# Patient Record
Sex: FEMALE | Race: WHITE | URBAN - METROPOLITAN AREA
[De-identification: names, ages, dates, MRNs, and addresses within clinical notes are randomized per-mention and may not be internally consistent; named-entity substitution may affect disease eponyms.]

---

## 2023-01-28 ENCOUNTER — EMERGENCY (EMERGENCY)
Facility: HOSPITAL | Age: 73
LOS: 0 days | Discharge: HOME | End: 2023-01-28
Attending: EMERGENCY MEDICINE
Payer: COMMERCIAL

## 2023-01-28 VITALS
SYSTOLIC BLOOD PRESSURE: 171 MMHG | HEIGHT: 64 IN | DIASTOLIC BLOOD PRESSURE: 94 MMHG | WEIGHT: 119.93 LBS | TEMPERATURE: 98 F | RESPIRATION RATE: 18 BRPM | OXYGEN SATURATION: 96 % | HEART RATE: 95 BPM
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
## 2023-01-28 DIAGNOSIS — M25.562 PAIN IN LEFT KNEE: ICD-10-CM

## 2023-01-28 DIAGNOSIS — S80.02XA CONTUSION OF LEFT KNEE, INITIAL ENCOUNTER: ICD-10-CM

## 2023-01-28 DIAGNOSIS — M25.571 PAIN IN RIGHT ANKLE AND JOINTS OF RIGHT FOOT: ICD-10-CM

## 2023-01-28 DIAGNOSIS — S90.01XA CONTUSION OF RIGHT ANKLE, INITIAL ENCOUNTER: ICD-10-CM
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
## 2023-01-28 PROCEDURE — 73562 X-RAY EXAM OF KNEE 3: CPT | Mod: LT

## 2023-01-28 PROCEDURE — 73610 X-RAY EXAM OF ANKLE: CPT | Mod: RT

## 2023-01-28 PROCEDURE — 99284 EMERGENCY DEPT VISIT MOD MDM: CPT

## 2023-01-28 PROCEDURE — 73610 X-RAY EXAM OF ANKLE: CPT | Mod: 26,RT

## 2023-01-28 PROCEDURE — 73562 X-RAY EXAM OF KNEE 3: CPT | Mod: 26,LT
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
## 2023-01-28 NOTE — ED STATDOCS - NS ED ATTENDING STATEMENT MOD
This was a shared visit with the DAMON. I reviewed and verified the documentation and independently performed the documented:

## 2023-01-28 NOTE — ED STATDOCS - PATIENT PORTAL LINK FT
You can access the FollowMyHealth Patient Portal offered by [facility] by registering at the following website: [email or web address]. By joining [employer]’s FollowMyHealth portal, you will also be able to view your health information using other applications (apps) compatible with our system.
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
## 2023-01-28 NOTE — ED STATDOCS - PHYSICAL EXAMINATION
Constitutional: NAD, well appearing  HEENT: no rhinorrhea, PERRL, no oropharyngeal erythema or exudates, midline uvula.  TMs clear.  CVS:  RRR, no m/r/g  Resp:  CTAB  GI: soft, ntnd  MSK:  no restriction to rom, full ROM to all extremities. TTP of the right lateral malleolus. Mild TTP of the medial left knee. Likely contusion. NVI distally to both locations.   Neuro:  A&Ox3, 5/5 strength to all extremities,  SILT to all extremities  Skin: no rash  psych: clear thought content  Heme/lymph:  No LAD
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
## 2023-01-28 NOTE — ED ADULT TRIAGE NOTE - NS ED NURSE BANDS TYPE
----- Message from Conner Tyson DO sent at 5/15/2020  7:25 AM CDT -----  Call patient with results.  Positive urine culture. Recommend starting cipro 500mg twice daily for 5 day.                    Name band;
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
## 2023-01-28 NOTE — ED STATDOCS - NS ED ROS FT
Constitutional: nad, well appearing  HEENT:  no nasal congestion, eye drainage or ear pain.    CVS:  no cp  Resp:  No sob, no cough  GI:  no abdominal pain, no nausea or vomiting  :  no dysuria  MSK: no joint pain or limited ROM. +Right ankle and left knee pain  Skin: no rash  Neuro: no change in mental status or level of consciousness  Heme/lymph: no bleeding

## 2023-01-28 NOTE — ED STATDOCS - CLINICAL SUMMARY MEDICAL DECISION MAKING FREE TEXT BOX
XR of knee and ankle to evaluate for fracture/dislocation. Discharge home. XR of knee and ankle to evaluate for fracture/dislocation.  NVI.  Anticipate discharge home.

## 2025-01-11 NOTE — ED STATDOCS - WR ORDER STATUS 2
[facility] Neurosurgery Daily Progress Note    Patient: Emely Gamboa Date: 2025   : 1955 Attending: Ginger Ramires DO   Admit Date: 2025 Room/Bed: 6364/W1   69 year old female      SUBJECTIVE:  Patient seen and examined. Sitting up in chair, in NAD.  Grandson at the bedside.  Continued back pain. Has been able to ambulate with the walker in her room.  Voiding. + flatus, - BM.   Febrile to 101.5F overnight. WBC 10.9    Review of Systems   Constitutional:  Negative for fever.   HENT:  Negative for trouble swallowing.    Respiratory:  Negative for shortness of breath.    Cardiovascular:  Negative for chest pain.   Gastrointestinal:  Positive for constipation. Negative for abdominal pain and vomiting.   Genitourinary:  Negative for difficulty urinating.   Musculoskeletal:  Positive for back pain. Negative for neck pain.   Neurological:  Positive for weakness. Negative for numbness and headaches.   Psychiatric/Behavioral:  Negative for agitation and confusion.      OBJECTIVE:    Medications/Infusions:  Scheduled:   Current Facility-Administered Medications   Medication Dose Route Frequency Provider Last Rate Last Admin    enalapril (VASOTEC) tablet 5 mg  5 mg Oral Daily [address] Montenegro PA   5 mg at 25 0857    famotidine (PEPCID) tablet 40 mg  40 mg Oral Daily [address] Monteengro PA   40 mg at 01/10/25 0828    gabapentin (NEURONTIN) capsule 800 mg  800 mg Oral TID [address] Monten[place]ro PA   800 mg at 01/10/25 2140    latanoprost (XALATAN) 0.005 % ophthalmic solution 1 drop  1 drop Both Eyes Nightly Ave Montenegro PA   1 drop at 01/10/25 2141    sodium chloride 0.9 % injection 2 mL  2 mL Intracatheter 2 times per day [facility] [place]en[place] PA   2 mL at 01/10/25 2142    heparin (porcine) injection 5,000 Units  5,000 Units Subcutaneous 3 times per day [facility] [place]en[place] PA   5,000 Units at 25 0538    dorzolamide-timolol (COSOPT) 2-0.5 % ophthalmic solution 1 drop  1 drop Both Eyes BID Leann Hagan MD   1  drop at 01/10/25 2142    insulin lispro (ADMELOG,HumaLOG) - Correction Dose   Subcutaneous 4x Daily AC & HS Leann Hagan MD   2 Units at 01/10/25 2154     PRN:    Current Facility-Administered Medications   Medication Dose Route Frequency Provider Last Rate Last Admin    cyclobenzaprine (FLEXERIL) tablet 5 mg  5 mg Oral BID PRN [facility] [place]en[place] PA   5 mg at 01/08/25 1842    docusate sodium (COLACE) capsule 100 mg  100 mg Oral Daily PRN [facility] [place] PA        acetaminophen (TYLENOL) tablet 650 mg  650 mg Oral Q8H PRN [facility] [place] PA        HYDROcodone-acetaminophen (NORCO) 5-325 MG per tablet 1 tablet  1 tablet Oral Q4H PRN [facility] [place]en[place]ro PA   1 tablet at 01/10/25 2154    Or    HYDROcodone-acetaminophen (NORCO)  MG per tablet 1 tablet  1 tablet Oral Q4H PRN [facility] [place]en[place]ro PA   1 tablet at 01/11/25 0543    morphine injection 2 mg  2 mg Intravenous Q3H PRN [facility] [place] PA   2 mg at 01/08/25 1700    polyethylene glycol (MIRALAX) packet 17 g  17 g Oral Daily PRN [facility] [place] PA        docusate sodium-sennosides (SENOKOT S) 50-8.6 MG 2 tablet  2 tablet Oral BID PRN [facility] [place]ro PA   2 tablet at 01/10/25 0838    bisacodyl (DULCOLAX) suppository 10 mg  10 mg Rectal Daily PRN [facility] [place]en[place]ro PA   10 mg at 01/10/25 1437    magnesium hydroxide (MILK OF MAGNESIA) 400 MG/5ML suspension 30 mL  30 mL Oral Daily PRN [facility] [place]en[place]ro PA   30 mL at 01/11/25 0543    sodium chloride 0.9 % injection 10 mL  10 mL Intravenous PRN [facility] [place]en[place]ro PA   10 mL at 01/08/25 0853    melatonin tablet 3 mg  3 mg Oral Nightly PRN Leann Hagan MD        potassium CHLORIDE (KLOR-CON M) rupert ER tablet 40 mEq  40 mEq Oral Daily PRN Leann Hagan MD        dextrose 50 % injection 25 g  25 g Intravenous PRN Leann Hagan MD        dextrose 50 % injection 12.5 g  12.5 g Intravenous PRN Leann Hagan MD        glucagon (GLUCAGEN) injection 1 mg  1 mg Intramuscular PRN Leann Hagan MD        dextrose  (GLUTOSE) 40 % gel 15 g  15 g Oral PRN Leann Hagan MD        dextrose (GLUTOSE) 40 % gel 30 g  30 g Oral PRN Leann Hagan MD          Vital Last Value 24 Hour Range   Temperature (!) 101.5 °F (38.6 °C) (01/11/25 0532) Temp  Min: 98.2 °F (36.8 °C)  Max: 101.5 °F (38.6 °C)   Pulse 92 (01/11/25 0532) Pulse  Min: 88  Max: 96   Respiratory 16 (01/11/25 0643) Resp  Min: 16  Max: 16   Non-Invasive  Blood Pressure 120/72 (01/11/25 0532) BP  Min: 109/71  Max: 132/73   Arterial   Blood Pressure   No data recorded   Pulse Oximetry 96 % (01/11/25 0532) SpO2  Min: 96 %  Max: 97 %     Intake/Output:    Intake/Output Summary (Last 24 hours) at 1/11/2025 0744  Last data filed at 1/10/2025 0800  Gross per 24 hour   Intake --   Output 10 ml   Net -10 ml     Physical Exam:   Constitutional:  No acute distress.    Integument:  Warm. Dry. Lumbar incision closed with sutures, C/D/I.   HENT:  Normocephalic.   Eyes:  PERRL, EOM intact.  Neck: Supple.   Cardiac:  Peripheral perfusion appears normal.   GI:  Soft.  Respiratory:  No respiratory distress noted.  Neuro:  Alert, Oriented x4. Answers questions and follows commands appropriately. Speech fluent. Cranial nerves II-XII grossly intact. Sensation intact to light touch throughout. BUE 5/5. BLE 5/5.   Psych:  Cooperative, appropriate mood and affect.    Laboratory Results:  CBC  Recent Labs   Lab 01/11/25  0502 01/10/25  0439 01/09/25  0454 01/08/25  0445   WBC 10.9 8.1 9.8 10.2   HCT 33.4* 31.5* 35.4* 33.7*   HGB 10.9* 10.0* 11.5* 10.8*   PLT  --   --   --  158       CMP  Recent Labs   Lab 01/07/25  0439   SODIUM 139   POTASSIUM 3.4   CHLORIDE 111*   CO2 23   GLUCOSE 208*   BUN 11   CREATININE 0.97*   CALCIUM 8.1*       Coags  No results found      Microbiology:  Microbiology Results       None               Imaging:   XR LUMBAR SPINE 1 VIEW    Result Date: 1/6/2025  EXAM: XR LUMBAR SPINE 1 VIEW PROVIDED CLINICAL INFORMATION/INDICATION FOR STUDY: intraop#1 5' 1\" ft 152 lb  ADDITIONAL COMMENTS: TECHNIQUE: XR LUMBAR SPINE 1 VIEW COMPARISON: MRI of the lumbar spine May 17, 2024 X-ray lumbar spine flexion extension July 13, 2024     FINDINGS/IMPRESSION: 1.   Single crosstable lateral intraoperative radiograph demonstrates a clamp overlying the superior aspect of the posterior L4 vertebral body. Electronically Signed by: MEA STEEL MD Signed on: 1/6/2025 1:12 PM Workstation ID: [id number]  '      IMPRESSION/PLAN:  70yo female with PMHx HTN, HLD, DM, lumbar stenosis with neurogenic claudication who presents for elective lumbar decompression.    POD5 Posterior Laminectomy and Foraminotomy L4-5 (1/6/24)  Hemovac x1 removed 1/10    Plan:  - Serial neuro checks, notify of changes  - Hold home aspirin until clinic follow-up appointment  - Incision care: daily dressing changes with gauze and tape or Telfa dressing  - Pain control prn   - Diet as tolerated  - Activity as tolerated, recommend up with assist. Ok for PT/OT  - No brace needed  - Prophylaxis: SCDs, okay for chemoppx, bowel regimen, IS q1hr while awake  - Medical management per primary, appreciate   - Dispo: ok for dc from nsgy standpoint pending medical clearance    Ok to shower, no baths. Pat incision dry. Daily dressing changes. No creams, lotions, or ointments. Follow-up on 1/23 @ 9:30am for incision check and sutures removal. Call our office if you need to reschedule or with any questions/concerns.     Discussed with Dr. Bridges      Performed Resulted

## 2025-03-16 ENCOUNTER — INPATIENT (INPATIENT)
Facility: HOSPITAL | Age: 75
LOS: 3 days | Discharge: HOME | DRG: 201 | End: 2025-03-20
Attending: INTERNAL MEDICINE | Admitting: THORACIC SURGERY (CARDIOTHORACIC VASCULAR SURGERY)
Payer: MEDICARE

## 2025-03-16 VITALS
OXYGEN SATURATION: 93 % | HEART RATE: 91 BPM | RESPIRATION RATE: 19 BRPM | TEMPERATURE: 98 F | HEIGHT: 64 IN | WEIGHT: 124.56 LBS | SYSTOLIC BLOOD PRESSURE: 183 MMHG | DIASTOLIC BLOOD PRESSURE: 137 MMHG

## 2025-03-16 DIAGNOSIS — Z98.891 HISTORY OF UTERINE SCAR FROM PREVIOUS SURGERY: Chronic | ICD-10-CM

## 2025-03-16 DIAGNOSIS — J93.9 PNEUMOTHORAX, UNSPECIFIED: ICD-10-CM

## 2025-03-16 LAB
ALBUMIN SERPL ELPH-MCNC: 3.6 G/DL — SIGNIFICANT CHANGE UP (ref 3.3–5)
ALP SERPL-CCNC: 104 U/L — SIGNIFICANT CHANGE UP (ref 40–120)
ALT FLD-CCNC: 18 U/L — SIGNIFICANT CHANGE UP (ref 12–78)
AST SERPL-CCNC: 13 U/L — LOW (ref 15–37)
BASE EXCESS BLDV CALC-SCNC: -0.8 MMOL/L — SIGNIFICANT CHANGE UP (ref -2–3)
BASOPHILS # BLD AUTO: 0.08 K/UL — SIGNIFICANT CHANGE UP (ref 0–0.2)
BASOPHILS NFR BLD AUTO: 1.1 % — SIGNIFICANT CHANGE UP (ref 0–2)
BILIRUB SERPL-MCNC: 1.2 MG/DL — SIGNIFICANT CHANGE UP (ref 0.2–1.2)
BUN SERPL-MCNC: 9 MG/DL — SIGNIFICANT CHANGE UP (ref 7–23)
CALCIUM SERPL-MCNC: 9.2 MG/DL — SIGNIFICANT CHANGE UP (ref 8.5–10.1)
CHLORIDE SERPL-SCNC: 104 MMOL/L — SIGNIFICANT CHANGE UP (ref 96–108)
CO2 SERPL-SCNC: 27 MMOL/L — SIGNIFICANT CHANGE UP (ref 22–31)
CREAT SERPL-MCNC: 0.77 MG/DL — SIGNIFICANT CHANGE UP (ref 0.5–1.3)
EGFR: 81 ML/MIN/1.73M2 — SIGNIFICANT CHANGE UP
EGFR: 81 ML/MIN/1.73M2 — SIGNIFICANT CHANGE UP
EOSINOPHIL # BLD AUTO: 0.09 K/UL — SIGNIFICANT CHANGE UP (ref 0–0.5)
EOSINOPHIL NFR BLD AUTO: 1.3 % — SIGNIFICANT CHANGE UP (ref 0–6)
GAS PNL BLDV: SIGNIFICANT CHANGE UP
GLUCOSE SERPL-MCNC: 105 MG/DL — HIGH (ref 70–99)
HCO3 BLDV-SCNC: 25 MMOL/L — SIGNIFICANT CHANGE UP (ref 22–29)
HCT VFR BLD CALC: 43 % — SIGNIFICANT CHANGE UP (ref 34.5–45)
HGB BLD-MCNC: 14.3 G/DL — SIGNIFICANT CHANGE UP (ref 11.5–15.5)
IMM GRANULOCYTES # BLD AUTO: 0.01 K/UL — SIGNIFICANT CHANGE UP (ref 0–0.07)
IMM GRANULOCYTES NFR BLD AUTO: 0.1 % — SIGNIFICANT CHANGE UP (ref 0–0.9)
LYMPHOCYTES # BLD AUTO: 1.24 K/UL — SIGNIFICANT CHANGE UP (ref 1–3.3)
LYMPHOCYTES NFR BLD AUTO: 17.5 % — SIGNIFICANT CHANGE UP (ref 13–44)
MAGNESIUM SERPL-MCNC: 2 MG/DL — SIGNIFICANT CHANGE UP (ref 1.6–2.6)
MCHC RBC-ENTMCNC: 29.9 PG — SIGNIFICANT CHANGE UP (ref 27–34)
MCHC RBC-ENTMCNC: 33.3 G/DL — SIGNIFICANT CHANGE UP (ref 32–36)
MCV RBC AUTO: 90 FL — SIGNIFICANT CHANGE UP (ref 80–100)
MONOCYTES # BLD AUTO: 0.72 K/UL — SIGNIFICANT CHANGE UP (ref 0–0.9)
MONOCYTES NFR BLD AUTO: 10.2 % — SIGNIFICANT CHANGE UP (ref 2–14)
NEUTROPHILS # BLD AUTO: 4.94 K/UL — SIGNIFICANT CHANGE UP (ref 1.8–7.4)
NEUTROPHILS NFR BLD AUTO: 69.8 % — SIGNIFICANT CHANGE UP (ref 43–77)
NRBC # BLD AUTO: 0 K/UL — SIGNIFICANT CHANGE UP (ref 0–0)
NRBC # FLD: 0 K/UL — SIGNIFICANT CHANGE UP (ref 0–0)
NRBC BLD AUTO-RTO: 0 /100 WBCS — SIGNIFICANT CHANGE UP (ref 0–0)
NT-PROBNP SERPL-SCNC: 169 PG/ML — HIGH (ref 0–125)
PCO2 BLDV: 44 MMHG — HIGH (ref 39–42)
PH BLDV: 7.36 — SIGNIFICANT CHANGE UP (ref 7.32–7.43)
PLATELET # BLD AUTO: 323 K/UL — SIGNIFICANT CHANGE UP (ref 150–400)
PMV BLD: 9.8 FL — SIGNIFICANT CHANGE UP (ref 7–13)
PO2 BLDV: 44 MMHG — SIGNIFICANT CHANGE UP (ref 25–45)
POTASSIUM SERPL-MCNC: 3.5 MMOL/L — SIGNIFICANT CHANGE UP (ref 3.5–5.3)
POTASSIUM SERPL-SCNC: 3.5 MMOL/L — SIGNIFICANT CHANGE UP (ref 3.5–5.3)
RBC # BLD: 4.78 M/UL — SIGNIFICANT CHANGE UP (ref 3.8–5.2)
RBC # FLD: 13.2 % — SIGNIFICANT CHANGE UP (ref 10.3–14.5)
SAO2 % BLDV: 74 % — SIGNIFICANT CHANGE UP (ref 67–88)
TROPONIN I, HIGH SENSITIVITY RESULT: 9.72 NG/L — SIGNIFICANT CHANGE UP
WBC # BLD: 7.08 K/UL — SIGNIFICANT CHANGE UP (ref 3.8–10.5)
WBC # FLD AUTO: 7.08 K/UL — SIGNIFICANT CHANGE UP (ref 3.8–10.5)

## 2025-03-16 PROCEDURE — 93010 ELECTROCARDIOGRAM REPORT: CPT

## 2025-03-16 PROCEDURE — 80048 BASIC METABOLIC PNL TOTAL CA: CPT

## 2025-03-16 PROCEDURE — 83036 HEMOGLOBIN GLYCOSYLATED A1C: CPT

## 2025-03-16 PROCEDURE — 84100 ASSAY OF PHOSPHORUS: CPT

## 2025-03-16 PROCEDURE — 71045 X-RAY EXAM CHEST 1 VIEW: CPT | Mod: 26,76

## 2025-03-16 PROCEDURE — 94640 AIRWAY INHALATION TREATMENT: CPT

## 2025-03-16 PROCEDURE — 71275 CT ANGIOGRAPHY CHEST: CPT | Mod: 26

## 2025-03-16 PROCEDURE — 32556 INSERT CATH PLEURA W/O IMAGE: CPT | Mod: LT

## 2025-03-16 PROCEDURE — 99285 EMERGENCY DEPT VISIT HI MDM: CPT

## 2025-03-16 PROCEDURE — 99282 EMERGENCY DEPT VISIT SF MDM: CPT | Mod: 25

## 2025-03-16 PROCEDURE — 36415 COLL VENOUS BLD VENIPUNCTURE: CPT

## 2025-03-16 PROCEDURE — 83735 ASSAY OF MAGNESIUM: CPT

## 2025-03-16 PROCEDURE — 85027 COMPLETE CBC AUTOMATED: CPT

## 2025-03-16 PROCEDURE — 71045 X-RAY EXAM CHEST 1 VIEW: CPT

## 2025-03-16 PROCEDURE — 80061 LIPID PANEL: CPT

## 2025-03-16 PROCEDURE — 84443 ASSAY THYROID STIM HORMONE: CPT

## 2025-03-16 PROCEDURE — 74177 CT ABD & PELVIS W/CONTRAST: CPT | Mod: 26

## 2025-03-16 PROCEDURE — 71250 CT THORAX DX C-: CPT | Mod: MC

## 2025-03-16 RX ORDER — IPRATROPIUM BROMIDE AND ALBUTEROL SULFATE .5; 2.5 MG/3ML; MG/3ML
3 SOLUTION RESPIRATORY (INHALATION) EVERY 6 HOURS
Refills: 0 | Status: DISCONTINUED | OUTPATIENT
Start: 2025-03-16 | End: 2025-03-20

## 2025-03-16 RX ORDER — ENOXAPARIN SODIUM 100 MG/ML
30 INJECTION SUBCUTANEOUS EVERY 24 HOURS
Refills: 0 | Status: DISCONTINUED | OUTPATIENT
Start: 2025-03-17 | End: 2025-03-20

## 2025-03-16 RX ORDER — IPRATROPIUM BROMIDE AND ALBUTEROL SULFATE .5; 2.5 MG/3ML; MG/3ML
3 SOLUTION RESPIRATORY (INHALATION) ONCE
Refills: 0 | Status: DISCONTINUED | OUTPATIENT
Start: 2025-03-16 | End: 2025-03-16

## 2025-03-16 RX ORDER — OXYCODONE HYDROCHLORIDE 30 MG/1
10 TABLET ORAL EVERY 4 HOURS
Refills: 0 | Status: DISCONTINUED | OUTPATIENT
Start: 2025-03-16 | End: 2025-03-20

## 2025-03-16 RX ORDER — KETOROLAC TROMETHAMINE 30 MG/ML
15 INJECTION, SOLUTION INTRAMUSCULAR; INTRAVENOUS EVERY 8 HOURS
Refills: 0 | Status: DISCONTINUED | OUTPATIENT
Start: 2025-03-16 | End: 2025-03-17

## 2025-03-16 RX ORDER — DIAZEPAM 2 MG/1
2 TABLET ORAL ONCE
Refills: 0 | Status: DISCONTINUED | OUTPATIENT
Start: 2025-03-16 | End: 2025-03-16

## 2025-03-16 RX ORDER — OXYCODONE HYDROCHLORIDE 30 MG/1
5 TABLET ORAL EVERY 4 HOURS
Refills: 0 | Status: DISCONTINUED | OUTPATIENT
Start: 2025-03-16 | End: 2025-03-20

## 2025-03-16 RX ORDER — ACETAMINOPHEN 500 MG/5ML
1000 LIQUID (ML) ORAL ONCE
Refills: 0 | Status: COMPLETED | OUTPATIENT
Start: 2025-03-16 | End: 2025-03-16

## 2025-03-16 RX ORDER — ACETAMINOPHEN 500 MG/5ML
650 LIQUID (ML) ORAL EVERY 6 HOURS
Refills: 0 | Status: DISCONTINUED | OUTPATIENT
Start: 2025-03-16 | End: 2025-03-20

## 2025-03-16 RX ORDER — DIAZEPAM 2 MG/1
5 TABLET ORAL EVERY 8 HOURS
Refills: 0 | Status: DISCONTINUED | OUTPATIENT
Start: 2025-03-16 | End: 2025-03-20

## 2025-03-16 RX ADMIN — KETOROLAC TROMETHAMINE 15 MILLIGRAM(S): 30 INJECTION, SOLUTION INTRAMUSCULAR; INTRAVENOUS at 18:37

## 2025-03-16 RX ADMIN — OXYCODONE HYDROCHLORIDE 5 MILLIGRAM(S): 30 TABLET ORAL at 22:35

## 2025-03-16 RX ADMIN — DIAZEPAM 2 MILLIGRAM(S): 2 TABLET ORAL at 18:58

## 2025-03-16 RX ADMIN — Medication 400 MILLIGRAM(S): at 18:37

## 2025-03-16 RX ADMIN — OXYCODONE HYDROCHLORIDE 5 MILLIGRAM(S): 30 TABLET ORAL at 22:15

## 2025-03-16 RX ADMIN — IPRATROPIUM BROMIDE AND ALBUTEROL SULFATE 3 MILLILITER(S): .5; 2.5 SOLUTION RESPIRATORY (INHALATION) at 20:34

## 2025-03-16 RX ADMIN — Medication 4 MILLIGRAM(S): at 17:40

## 2025-03-16 NOTE — ED ADULT NURSE REASSESSMENT NOTE - NS ED NURSE REASSESS COMMENT FT1
Obtained bedside report from SIRISHA Ramirez at 19:00. Pt resting comfortably in bed, breathing even and unlabored on 15L NRB. Pt reports improving left sided rib pain s/p chest tube placement. Pt's chest tube connected on left side on low continuous -20 suction. Pt has scant amount of light red fluid in draining tube, but no output in chest tube box. VS as charted. Pt awaiting admission bed. Safety and comfort measures maintained.

## 2025-03-16 NOTE — PROCEDURE NOTE - NSASSISTBY_GEN_A_CORE
Transitional Care Management    Patient discharged to skilled nursing facility for short term rehab. No TCM call required per policy.   Per chart review patient has been seeing Darlene Ramos at [facility].    Linda Narvaez RN on 9/28/2022 at 9:13 AM    
Myself

## 2025-03-16 NOTE — ED PROVIDER NOTE - OBJECTIVE STATEMENT
74F here for worsening DE LEÓN x 2weeks. Pt came at behest of her sister who has noticed her tolerance for exertion decreasing. pt has not seen a doctor in 14 years since a pulmonologist told her she has emphysema. Pt takes no medications, sometimes uses her sister's inhaler but says she doesn't like to because of the taste.

## 2025-03-16 NOTE — ED ADULT NURSE NOTE - OBJECTIVE STATEMENT
The patient is a 74y Female complaining of shortness of breath. PMH: emphysema, 2 weeks of worsening breathing, denies fevers, CP, palpitations. large hernia noted to kylie

## 2025-03-16 NOTE — H&P ADULT - NSHPLABSRESULTS_GEN_ALL_CORE
< from: CT Angio Chest PE Protocol w/ IV Cont (03.16.25 @ 17:05) >    IMPRESSION:  Large left pneumothorax without volume loss in the setting of COPD.   Tension pneumothorax cannot be excluded. This critical value was   discussed with MOON Nguyen in the emergency room at 5:20 PM on 3/16/2025.   Clinical correlation is necessary.    Atelectasis of the left lower lobe and mild left pleural fluid.    Large ventral hernia containing small and large bowel without obstruction.    < end of copied text >

## 2025-03-16 NOTE — ED PROVIDER NOTE - PROGRESS NOTE DETAILS
signed Anna Nguyen PA-C   Pt says she noticed the abdominal wall mass about 2 years ago and it has been increasing in size. Plan labs, CT, ambulatory pulse ox. Pt agrees with plan of  care. signed Anna Nguyen, PA-C   cxr with large left PTX. I spoke to thoracic PA, will see pt in ED.

## 2025-03-16 NOTE — H&P ADULT - ASSESSMENT
This is a 75 y/o F , non compliant, + COPD with large left pneumothorax. s/p Left pigtail insertion

## 2025-03-16 NOTE — PATIENT PROFILE ADULT - FALL HARM RISK - RISK INTERVENTIONS

## 2025-03-16 NOTE — H&P ADULT - PROBLEM SELECTOR PLAN 1
Admit to Dr Mejia service   Left pigtail to -20 LWCS  pain medication  IS  Hospitalist consult   Lovenox DVTP   Bed rest   Valium PRN   DW DR Higginbotham Admit to Dr Mejia service   Left pigtail to -20 LWCS  pain medication  IS  Hospitalist consult   Pulmonology consult   Lovenox DVTP   Bed rest   Valium PRN   COPD- nebs prn  DW DR Higginbotham

## 2025-03-16 NOTE — H&P ADULT - HISTORY OF PRESENT ILLNESS
This is a 73 y/o F who [facility] not [facility] to [facility] over [facility], as per her sister she has not been breathing well the last 2 weeks and has been getting progressively worse. The patient has been refusing to come to the hospital. Pt sister finally convinced her to go and on workup noted a large left PTX.  Left Pigtail was placed at bedside with complete expansion of lung . Pt denies CP, HA, dizziness .

## 2025-03-16 NOTE — H&P ADULT - RESPIRATORY
no wheezes/no respiratory distress/no use of accessory muscles/no subcutaneous emphysema/diminished breath sounds, L

## 2025-03-16 NOTE — ED PROVIDER NOTE - ATTENDING APP SHARED VISIT CONTRIBUTION OF CARE
I, Dieter Arizmendi DO, personally saw the patient with ACP.  I have personally performed a face to face diagnostic evaluation on this patient.  I have reviewed the ACP note and agree with the history, exam, and plan of care, except as noted.  I personally saw the patient and performed a substantive portion of the visit including all aspects of the medical decision making.

## 2025-03-16 NOTE — ED ADULT NURSE NOTE - NS ED NOTE ABUSE SUSPICION NEGLECT YN
Received call from Kaylin at [facility] stating that patient will likely be discharged home today from in patient rehab. Kaylin requesting our clinic's standard home health orders for their MD to include in patient's home health orders. Standard home health LVAD orders reviewed with Brendan Tee and faxed to Kaylin for review by her MD.     Km Lopez and spoke to patient's sister Albert Cristy who confirmed that she is aware patient to be discharged today. She is unsure if patient's nephew ordered dressing change supplies on Friday. Provided number to Phyllis and requested Albert Muniz call to check status of shipment and confirm address. She verbalized understanding. Received return call from Albert Muniz stating that she spoke to Phyllis who stated they do not have a current order for this patient. No

## 2025-03-16 NOTE — ED PROVIDER NOTE - CLINICAL SUMMARY MEDICAL DECISION MAKING FREE TEXT BOX
74F here for worsening DE LEÓN x 2weeks. Pt came at behest of her sister who has noticed her tolerance for exertion decreasing. pt has not seen a doctor in 14 years since a pulmonologist told her she has emphysema. Patient well-appearing, hemodynamically stable with clear lungs otherwise nonfocal exam.  Differential includes to new CHF, low suspicion for COPD exacerbation, some concern for pneumonia versus other pulmonary pathology.  Plan for labs, EKG, chest x-ray.  Reassess

## 2025-03-17 PROBLEM — Z78.9 OTHER SPECIFIED HEALTH STATUS: Chronic | Status: ACTIVE | Noted: 2023-01-28

## 2025-03-17 LAB
A1C WITH ESTIMATED AVERAGE GLUCOSE RESULT: 5.8 % — HIGH (ref 4–5.6)
ADD ON TEST-SPECIMEN IN LAB: SIGNIFICANT CHANGE UP
ANION GAP SERPL CALC-SCNC: 6 MMOL/L — SIGNIFICANT CHANGE UP (ref 5–17)
BUN SERPL-MCNC: 17 MG/DL — SIGNIFICANT CHANGE UP (ref 7–23)
CALCIUM SERPL-MCNC: 8.8 MG/DL — SIGNIFICANT CHANGE UP (ref 8.5–10.1)
CHLORIDE SERPL-SCNC: 108 MMOL/L — SIGNIFICANT CHANGE UP (ref 96–108)
CHOLEST SERPL-MCNC: 171 MG/DL — SIGNIFICANT CHANGE UP
CO2 SERPL-SCNC: 24 MMOL/L — SIGNIFICANT CHANGE UP (ref 22–31)
CREAT SERPL-MCNC: 1.02 MG/DL — SIGNIFICANT CHANGE UP (ref 0.5–1.3)
EGFR: 58 ML/MIN/1.73M2 — LOW
EGFR: 58 ML/MIN/1.73M2 — LOW
ESTIMATED AVERAGE GLUCOSE: 120 MG/DL — HIGH (ref 68–114)
GLUCOSE SERPL-MCNC: 113 MG/DL — HIGH (ref 70–99)
HCT VFR BLD CALC: 37.6 % — SIGNIFICANT CHANGE UP (ref 34.5–45)
HDLC SERPL-MCNC: 35 MG/DL — LOW
HGB BLD-MCNC: 12.4 G/DL — SIGNIFICANT CHANGE UP (ref 11.5–15.5)
LIPID PNL WITH DIRECT LDL SERPL: 112 MG/DL — HIGH
MAGNESIUM SERPL-MCNC: 2 MG/DL — SIGNIFICANT CHANGE UP (ref 1.6–2.6)
MCHC RBC-ENTMCNC: 29.7 PG — SIGNIFICANT CHANGE UP (ref 27–34)
MCHC RBC-ENTMCNC: 33 G/DL — SIGNIFICANT CHANGE UP (ref 32–36)
MCV RBC AUTO: 90.2 FL — SIGNIFICANT CHANGE UP (ref 80–100)
NON HDL CHOLESTEROL: 136 MG/DL — HIGH
NRBC # BLD AUTO: 0 K/UL — SIGNIFICANT CHANGE UP (ref 0–0)
NRBC # FLD: 0 K/UL — SIGNIFICANT CHANGE UP (ref 0–0)
NRBC BLD AUTO-RTO: 0 /100 WBCS — SIGNIFICANT CHANGE UP (ref 0–0)
PHOSPHATE SERPL-MCNC: 5 MG/DL — HIGH (ref 2.5–4.5)
PLATELET # BLD AUTO: 299 K/UL — SIGNIFICANT CHANGE UP (ref 150–400)
PMV BLD: 10.3 FL — SIGNIFICANT CHANGE UP (ref 7–13)
POTASSIUM SERPL-MCNC: 3.6 MMOL/L — SIGNIFICANT CHANGE UP (ref 3.5–5.3)
POTASSIUM SERPL-SCNC: 3.6 MMOL/L — SIGNIFICANT CHANGE UP (ref 3.5–5.3)
RBC # BLD: 4.17 M/UL — SIGNIFICANT CHANGE UP (ref 3.8–5.2)
RBC # FLD: 13.3 % — SIGNIFICANT CHANGE UP (ref 10.3–14.5)
SODIUM SERPL-SCNC: 138 MMOL/L — SIGNIFICANT CHANGE UP (ref 135–145)
TRIGL SERPL-MCNC: 132 MG/DL — SIGNIFICANT CHANGE UP
TSH SERPL-MCNC: 3.75 UU/ML — SIGNIFICANT CHANGE UP (ref 0.34–4.82)
WBC # BLD: 9.42 K/UL — SIGNIFICANT CHANGE UP (ref 3.8–10.5)
WBC # FLD AUTO: 9.42 K/UL — SIGNIFICANT CHANGE UP (ref 3.8–10.5)

## 2025-03-17 PROCEDURE — 99231 SBSQ HOSP IP/OBS SF/LOW 25: CPT

## 2025-03-17 PROCEDURE — 99232 SBSQ HOSP IP/OBS MODERATE 35: CPT

## 2025-03-17 PROCEDURE — 99222 1ST HOSP IP/OBS MODERATE 55: CPT

## 2025-03-17 PROCEDURE — 71045 X-RAY EXAM CHEST 1 VIEW: CPT | Mod: 26,77

## 2025-03-17 PROCEDURE — 71045 X-RAY EXAM CHEST 1 VIEW: CPT | Mod: 26

## 2025-03-17 RX ORDER — PREDNISONE 20 MG/1
20 TABLET ORAL DAILY
Refills: 0 | Status: DISCONTINUED | OUTPATIENT
Start: 2025-03-17 | End: 2025-03-20

## 2025-03-17 RX ORDER — TIOTROPIUM BROMIDE AND OLODATEROL 3.124; 2.736 UG/1; UG/1
2 SPRAY, METERED RESPIRATORY (INHALATION) DAILY
Refills: 0 | Status: DISCONTINUED | OUTPATIENT
Start: 2025-03-17 | End: 2025-03-20

## 2025-03-17 RX ADMIN — IPRATROPIUM BROMIDE AND ALBUTEROL SULFATE 3 MILLILITER(S): .5; 2.5 SOLUTION RESPIRATORY (INHALATION) at 02:00

## 2025-03-17 RX ADMIN — IPRATROPIUM BROMIDE AND ALBUTEROL SULFATE 3 MILLILITER(S): .5; 2.5 SOLUTION RESPIRATORY (INHALATION) at 08:58

## 2025-03-17 RX ADMIN — OXYCODONE HYDROCHLORIDE 5 MILLIGRAM(S): 30 TABLET ORAL at 07:56

## 2025-03-17 RX ADMIN — OXYCODONE HYDROCHLORIDE 5 MILLIGRAM(S): 30 TABLET ORAL at 03:58

## 2025-03-17 RX ADMIN — KETOROLAC TROMETHAMINE 15 MILLIGRAM(S): 30 INJECTION, SOLUTION INTRAMUSCULAR; INTRAVENOUS at 13:17

## 2025-03-17 RX ADMIN — ENOXAPARIN SODIUM 30 MILLIGRAM(S): 100 INJECTION SUBCUTANEOUS at 09:20

## 2025-03-17 RX ADMIN — IPRATROPIUM BROMIDE AND ALBUTEROL SULFATE 3 MILLILITER(S): .5; 2.5 SOLUTION RESPIRATORY (INHALATION) at 15:09

## 2025-03-17 RX ADMIN — KETOROLAC TROMETHAMINE 15 MILLIGRAM(S): 30 INJECTION, SOLUTION INTRAMUSCULAR; INTRAVENOUS at 05:25

## 2025-03-17 RX ADMIN — IPRATROPIUM BROMIDE AND ALBUTEROL SULFATE 3 MILLILITER(S): .5; 2.5 SOLUTION RESPIRATORY (INHALATION) at 19:54

## 2025-03-17 RX ADMIN — KETOROLAC TROMETHAMINE 15 MILLIGRAM(S): 30 INJECTION, SOLUTION INTRAMUSCULAR; INTRAVENOUS at 07:56

## 2025-03-17 RX ADMIN — PREDNISONE 20 MILLIGRAM(S): 20 TABLET ORAL at 11:38

## 2025-03-17 NOTE — PROGRESS NOTE ADULT - SUBJECTIVE AND OBJECTIVE BOX
Subjective:  Pt seen, no new complaints. Seen on 4L    Vital Signs:  Vital Signs Last 24 Hrs  T(C): 36.5 (25 @ 08:27), Max: 36.7 (25 @ 20:35)  T(F): 97.7 (25 @ 08:27), Max: 98.1 (25 @ 20:35)  HR: 69 (25 @ 08:27) (68 - 91)  BP: 125/71 (25 @ 08:27) (105/60 - 183/137)  RR: 18 (25 @ 08:27) (16 - 19)  SpO2: 100% (25 @ 08:27) (86% - 100%) on (O2)    Telemetry/Alarms:    Relevant labs, radiology and Medications reviewed                        12.4   9.42  )-----------( 299      ( 17 Mar 2025 07:14 )             37.6     -    138  |  108  |  17  ----------------------------<  113[H]  3.6   |  24  |  1.02    Ca    8.8      17 Mar 2025 07:14  Phos  5.0     -  Mg     2.0         TPro  7.3  /  Alb  3.6  /  TBili  1.2  /  DBili  x   /  AST  13[L]  /  ALT  18  /  AlkPhos  104  03-16      MEDICATIONS  (STANDING):  albuterol/ipratropium for Nebulization 3 milliLiter(s) Nebulizer every 6 hours  enoxaparin Injectable 30 milliGRAM(s) SubCutaneous every 24 hours  ketorolac   Injectable 15 milliGRAM(s) IV Push every 8 hours  predniSONE   Tablet 20 milliGRAM(s) Oral daily  tiotropium 2.5 MICROgram(s)/olodaterol 2.5 MICROgram(s) (STIOLTO) Inhaler 2 Puff(s) Inhalation daily    MEDICATIONS  (PRN):  acetaminophen     Tablet .. 650 milliGRAM(s) Oral every 6 hours PRN Mild Pain (1 - 3)  diazepam    Tablet 5 milliGRAM(s) Oral every 8 hours PRN muscle spasm  oxyCODONE    IR 5 milliGRAM(s) Oral every 4 hours PRN Moderate Pain (4 - 6)  oxyCODONE    IR 10 milliGRAM(s) Oral every 4 hours PRN Severe Pain (7 - 10)      Physical exam  Gen NAD  Neuro  AAOx3  Card RRR  Pulm equal expansion, chest tube to suction, no AL noted  Abd soft  Ext warm        I&O's Summary      Assessment  74y Female  w/ PAST MEDICAL & SURGICAL HISTORY:  No pertinent past medical history      Emphysema/COPD      H/O  section      admitted with complaints of Patient is a 74y old  Female who presents with a chief complaint of left Pneumothorax (17 Mar 2025 09:44)  .  75 y/o F , non compliant, + COPD with large left pneumothorax. s/p Left pigtail insertion 3/16/25    Pigtial placed to waterseal, CXR in 4 hours  pulmonary consulted, will start steroids and pulmonary meds  has not seen HCP in ~14 years, medical w/u  transfer to medical service, d/w Dr. Cevallos  will follow    Discussed with Cardiothoracic Team at AM rounds.      Subjective:  Pt seen, no new complaints. Seen on 4L    Vital Signs:  Vital Signs Last 24 Hrs  T(C): 36.5 (25 @ 08:27), Max: 36.7 (25 @ 20:35)  T(F): 97.7 (25 @ 08:27), Max: 98.1 (25 @ 20:35)  HR: 69 (25 @ 08:27) (68 - 91)  BP: 125/71 (25 @ 08:27) (105/60 - 183/137)  RR: 18 (25 @ 08:27) (16 - 19)  SpO2: 100% (25 @ 08:27) (86% - 100%) on (O2)    Telemetry/Alarms:    Relevant labs, radiology and Medications reviewed                        12.4   9.42  )-----------( 299      ( 17 Mar 2025 07:14 )             37.6     -    138  |  108  |  17  ----------------------------<  113[H]  3.6   |  24  |  1.02    Ca    8.8      17 Mar 2025 07:14  Phos  5.0     -  Mg     2.0         TPro  7.3  /  Alb  3.6  /  TBili  1.2  /  DBili  x   /  AST  13[L]  /  ALT  18  /  AlkPhos  104  03-16      MEDICATIONS  (STANDING):  albuterol/ipratropium for Nebulization 3 milliLiter(s) Nebulizer every 6 hours  enoxaparin Injectable 30 milliGRAM(s) SubCutaneous every 24 hours  ketorolac   Injectable 15 milliGRAM(s) IV Push every 8 hours  predniSONE   Tablet 20 milliGRAM(s) Oral daily  tiotropium 2.5 MICROgram(s)/olodaterol 2.5 MICROgram(s) (STIOLTO) Inhaler 2 Puff(s) Inhalation daily    MEDICATIONS  (PRN):  acetaminophen     Tablet .. 650 milliGRAM(s) Oral every 6 hours PRN Mild Pain (1 - 3)  diazepam    Tablet 5 milliGRAM(s) Oral every 8 hours PRN muscle spasm  oxyCODONE    IR 5 milliGRAM(s) Oral every 4 hours PRN Moderate Pain (4 - 6)  oxyCODONE    IR 10 milliGRAM(s) Oral every 4 hours PRN Severe Pain (7 - 10)      Physical exam  Gen NAD  Neuro  AAOx3  Card RRR  Pulm equal expansion, chest tube to suction, no AL noted  Abd soft  Ext warm        I&O's Summary      Assessment  74y Female  w/ PAST MEDICAL & SURGICAL HISTORY:  No pertinent past medical history      Emphysema/COPD      H/O  section      admitted with complaints of Patient is a 74y old  Female who presents with a chief complaint of left Pneumothorax (17 Mar 2025 09:44)  .  75 y/o F , non compliant, + COPD with large left pneumothorax. s/p Left pigtail insertion 3/16/25    Pigtial placed to waterseal, CXR in 4 hours  pulmonary consulted, will start steroids and pulmonary meds  has not seen HCP in ~14 years, medical w/u  transfer to medical service, d/w Dr. Cevallos  wean O2 as able, may need home O2 on d/c  will follow    Discussed with Cardiothoracic Team at AM rounds.

## 2025-03-17 NOTE — CONSULT NOTE ADULT - SUBJECTIVE AND OBJECTIVE BOX
HPI:  This is a 73 y/o F who [facility] not been to [facility] in over 14 years, as per her sister she has not been breathing well the last 2 weeks and has been getting progressively worse. The patient has been refusing to come to the hospital. Pt sister finally convinced her to go and on workup noted a large left PTX.  Left Pigtail was placed at bedside with complete expansion of lung . Pt denies CP, HA, dizziness .   (16 Mar 2025 18:17)    PAST MEDICAL & SURGICAL HISTORY:  No pertinent past medical history      Emphysema/COPD      H/O  section          MEDICATIONS  (STANDING):  albuterol/ipratropium for Nebulization 3 milliLiter(s) Nebulizer every 6 hours  enoxaparin Injectable 30 milliGRAM(s) SubCutaneous every 24 hours  ketorolac   Injectable 15 milliGRAM(s) IV Push every 8 hours  predniSONE   Tablet 20 milliGRAM(s) Oral daily  tiotropium 2.5 MICROgram(s)/olodaterol 2.5 MICROgram(s) (STIOLTO) Inhaler 2 Puff(s) Inhalation daily    MEDICATIONS  (PRN):  acetaminophen     Tablet .. 650 milliGRAM(s) Oral every 6 hours PRN Mild Pain (1 - 3)  diazepam    Tablet 5 milliGRAM(s) Oral every 8 hours PRN muscle spasm  oxyCODONE    IR 5 milliGRAM(s) Oral every 4 hours PRN Moderate Pain (4 - 6)  oxyCODONE    IR 10 milliGRAM(s) Oral every 4 hours PRN Severe Pain (7 - 10)      Allergies    No Known Allergies    Intolerances        SOCIAL HISTORY: Denies tobacco, etoh abuse or illicit drug use    FAMILY HISTORY:      Vital Signs Last 24 Hrs  T(C): 36.5 (17 Mar 2025 08:27), Max: 36.7 (16 Mar 2025 20:35)  T(F): 97.7 (17 Mar 2025 08:27), Max: 98.1 (16 Mar 2025 20:35)  HR: 69 (17 Mar 2025 08:27) (68 - 91)  BP: 125/71 (17 Mar 2025 08:27) (105/60 - 183/137)  BP(mean): 90 (16 Mar 2025 20:35) (90 - 149)  RR: 18 (17 Mar 2025 08:27) (16 - 19)  SpO2: 100% (17 Mar 2025 08:27) (86% - 100%)    Parameters below as of 17 Mar 2025 08:27  Patient On (Oxygen Delivery Method): mask, nonrebreather        REVIEW OF SYSTEMS:    CONSTITUTIONAL:  As per HPI.  HEENT:  Eyes:  No diplopia or blurred vision. ENT:  No earache, sore throat or runny nose.  CARDIOVASCULAR:  No pressure, squeezing, tightness, heaviness or aching about the chest, neck, axilla or epigastrium.  RESPIRATORY:  No cough, shortness of breath, PND or orthopnea.  GASTROINTESTINAL:  No nausea, vomiting or diarrhea.  GENITOURINARY:  No dysuria, frequency or urgency.  MUSCULOSKELETAL:  As per HPI.  SKIN:  No change in skin, hair or nails.  NEUROLOGIC:  No paresthesias, fasciculations, seizures or weakness.  PSYCHIATRIC:  No disorder of thought or mood.  ENDOCRINE:  No heat or cold intolerance, polyuria or polydipsia.  HEMATOLOGICAL:  No easy bruising or bleedings:  .     PHYSICAL EXAMINATION:    GENERAL APPEARANCE:  Pt. is alert, oriented, and pleasant. No Respiratory  distress.   Well-hydrated    On oxygen via nc @   HEENT:  normocephalic,atraumatic,  Pupils are normal and react normally. No icterus.  Mucous membranes of oral cavity is  moist .   eom - wnl   Normal hearing   NECK:  Supple. No cervical or supraclavicular adenopathy. No jugular venous distension   Supple with full range of motion ,  Carotids equal.   HEART:   The cardiac impulse has a normal quality. Regular Rate and rhythm. Normal S1 and S2. There are no murmurs, rubs or gallops noted  CHEST:   Normal respiratory effort. B/l equal air entry , Adequate air movement   No wheeze, no crackles , No use of accessory muscles   ABDOMEN:  Soft and nontender, No hernias  EXTREMITIES:  There is no cyanosis, clubbing or edema.   SKIN:  No rash or significant lesions are noted.  NEUROLOGIC:  Oriented x 3.  Appropriate mood and affect. No focal deficits         LABS:                        12.4   9.42  )-----------( 299      ( 17 Mar 2025 07:14 )             37.6     03-17    138  |  108  |  17  ----------------------------<  113[H]  3.6   |  24  |  1.02    Ca    8.8      17 Mar 2025 07:14  Phos  5.0     03-17  Mg     2.0     03-17    TPro  7.3  /  Alb  3.6  /  TBili  1.2  /  DBili  x   /  AST  13[L]  /  ALT  18  /  AlkPhos  104  03-16    LIVER FUNCTIONS - ( 16 Mar 2025 16:18 )  Alb: 3.6 g/dL / Pro: 7.3 gm/dL / ALK PHOS: 104 U/L / ALT: 18 U/L / AST: 13 U/L / GGT: x                 Urinalysis Basic - ( 17 Mar 2025 07:14 )    Color: x / Appearance: x / SG: x / pH: x  Gluc: 113 mg/dL / Ketone: x  / Bili: x / Urobili: x   Blood: x / Protein: x / Nitrite: x   Leuk Esterase: x / RBC: x / WBC x   Sq Epi: x / Non Sq Epi: x / Bacteria: x           HPI:  73 y/o Current current smoker with history of shortness of breath last 2 weeks and has been getting progressively worse.  Chest x-ray shows left-sided tension pneumothorax. Status post Left Pigtail was placed at bedside with complete expansion of lung . Pt denies CP, HA, dizziness .   (16 Mar 2025 18:17)  Additional pulmonary history includes.  Previous history of emphysema.  Not on inhaled medications.  Baseline chronic productive cough.  No prior history of pneumothorax.  No recent hospitalization does not use inhaled medications no prior diagnosis of lung cancer.  Able to perform activities of daily living without getting shortness of breath    PAST MEDICAL & SURGICAL HISTORY:  No pertinent past medical history      Emphysema/COPD      H/O  section          MEDICATIONS  (STANDING):  albuterol/ipratropium for Nebulization 3 milliLiter(s) Nebulizer every 6 hours  enoxaparin Injectable 30 milliGRAM(s) SubCutaneous every 24 hours  predniSONE   Tablet 20 milliGRAM(s) Oral daily  tiotropium 2.5 MICROgram(s)/olodaterol 2.5 MICROgram(s) (STIOLTO) Inhaler 2 Puff(s) Inhalation daily    MEDICATIONS  (PRN):  acetaminophen     Tablet .. 650 milliGRAM(s) Oral every 6 hours PRN Mild Pain (1 - 3)  diazepam    Tablet 5 milliGRAM(s) Oral every 8 hours PRN muscle spasm  oxyCODONE    IR 5 milliGRAM(s) Oral every 4 hours PRN Moderate Pain (4 - 6)  oxyCODONE    IR 10 milliGRAM(s) Oral every 4 hours PRN Severe Pain (7 - 10)      Allergies    No Known Allergies    Intolerances        SOCIAL HISTORY: Denies tobacco, etoh abuse or illicit drug use    FAMILY HISTORY:      Vital Signs Last 24 Hrs  T(C): 36.7 (17 Mar 2025 20:30), Max: 36.7 (16 Mar 2025 21:40)  T(F): 98.1 (17 Mar 2025 20:30), Max: 98.1 (16 Mar 2025 21:40)  HR: 97 (17 Mar 2025 20:30) (68 - 97)  BP: 127/76 (17 Mar 2025 20:30) (105/60 - 144/92)  BP(mean): --  RR: 18 (17 Mar 2025 20:30) (16 - 19)  SpO2: 93% (17 Mar 2025 20:30) (86% - 100%)    Parameters below as of 17 Mar 2025 20:30  Patient On (Oxygen Delivery Method): nasal cannula  O2 Flow (L/min): 6      REVIEW OF SYSTEMS:    CONSTITUTIONAL:  As per HPI.  HEENT:  Eyes:  No diplopia or blurred vision. ENT:  No earache, sore throat or runny nose.  CARDIOVASCULAR: Patient reports some chest pressure left side chest pain from the site of chest tubeRESPIRATORY:  No cough, shortness of breath, PND or orthopnea.  GASTROINTESTINAL:  No nausea, vomiting or diarrhea.  MUSCULOSKELETAL:  As per HPI.  SKIN:  No change in skin, hair or nails.  NEUROLOGIC:  No paresthesias, fasciculations  PSYCHIATRIC:  No disorder of thought or mood.  ENDOCRINE:  No heat or cold intolerance, polyuria or polydipsia.  HEMATOLOGICAL:  No easy bruising or bleedings:  .     PHYSICAL EXAMINATION:    GENERAL APPEARANCE:  Pt. is alert, oriented, and pleasant. No Respiratory  distress.   Well-hydrated    On oxygen via nc   HEENT:  normocephalic,atraumatic,  Pupils are normal and react normally. No icterus.  Mucous membranes of oral cavity is  moist .   NECK:  Supple. No cervical or supraclavicular adenopathy. No jugular venous distension      HEART:  . Normal S1 and S2.   CHEST:    No wheeze, no crackles , No use of accessory muscles Left-sided chest tube.  Tidaling.  Prolonged expiration.    ABDOMEN:  Soft and  Distended epigastric hernia nonstrangulated  EXTREMITIES:  There is no cyanosis, clubbing or edema.   SKIN:  No rash or significant lesions are noted.  NEUROLOGIC:  Oriented x 3.  Appropriate mood and affect. No focal deficits         LABS:                        12.4   9.42  )-----------( 299      ( 17 Mar 2025 07:14 )             37.6     03-17    138  |  108  |  17  ----------------------------<  113[H]  3.6   |  24  |  1.02    Ca    8.8      17 Mar 2025 07:14  Phos  5.0     03-17  Mg     2.0     -17    TPro  7.3  /  Alb  3.6  /  TBili  1.2  /  DBili  x   /  AST  13[L]  /  ALT  18  /  AlkPhos  104  03-16    LIVER FUNCTIONS - ( 16 Mar 2025 16:18 )  Alb: 3.6 g/dL / Pro: 7.3 gm/dL / ALK PHOS: 104 U/L / ALT: 18 U/L / AST: 13 U/L / GGT: x           Urinalysis Basic - ( 17 Mar 2025 07:14 )      RADIOLOGY & ADDITIONAL STUDIES:   < from: Xray Chest 1 View- PORTABLE-Routine (Xray Chest 1 View- PORTABLE-Routine in AM.) (25 @ 07:00) >  New hazy right upper lobe opacity.  Left pleural pigtail catheter with tip projecting over the periphery of   the left mid hemithorax. Reexpansion ofmuch of the left lung with a   small residual left apical pneumothorax.  There is left lower lung platelike and subsegmental atelectasis.  There is left subcutaneous emphysema.    < end of copied text >  < from: CT Angio Chest PE Protocol w/ IV Cont (25 @ 17:05) >  Large left pneumothorax without volume loss in the setting of COPD.   Tension pneumothorax cannot be excluded. This critical value was   discussed with MOON Nguyen in the emergency room at 5:20 PM on 3/16/2025.   Clinical correlation is necessary.    Atelectasis of the left lower lobe and mild left pleural fluid.    Large ventral hernia containing small and large bowel without obstruction.      < end of copied text >

## 2025-03-17 NOTE — CONSULT NOTE ADULT - SUBJECTIVE AND OBJECTIVE BOX
PCP: none    CHIEF COMPLAINT:  SOB    HISTORY OF THE PRESENT ILLNESS: 74F, has not seen a doctor in 14 years, current smoker/vape, p/w SOB worsening over 2 wks, foun dto have large L PTX, now s/p L sided pigtail. On review, also w large ventral hernia which pt reports has been there for years, never with pain, redness, n/v.    PAST MEDICAL HISTORY: none per pt    PAST SURGICAL HISTORY: prior c sxn    FAMILY HISTORY:   non-contributory to the patient's current presentation    SOCIAL HISTORY:  no smoking, no alcohol, no drugs    REVIEW OF SYSTEMS:   All 10 systems reviewed in detailed and found to be negative with the exception of what has already been described above    MEDICATIONS  (STANDING):  albuterol/ipratropium for Nebulization 3 milliLiter(s) Nebulizer every 6 hours  enoxaparin Injectable 30 milliGRAM(s) SubCutaneous every 24 hours  ketorolac   Injectable 15 milliGRAM(s) IV Push every 8 hours    MEDICATIONS  (PRN):  acetaminophen     Tablet .. 650 milliGRAM(s) Oral every 6 hours PRN Mild Pain (1 - 3)  diazepam    Tablet 5 milliGRAM(s) Oral every 8 hours PRN muscle spasm  oxyCODONE    IR 5 milliGRAM(s) Oral every 4 hours PRN Moderate Pain (4 - 6)  oxyCODONE    IR 10 milliGRAM(s) Oral every 4 hours PRN Severe Pain (7 - 10)      HEENT:  pupils equal and reactive, EOMI, no oropharyngeal lesions, erythema, exudates, oral thrush  NECK:   supple, no carotid bruits  CV:  +S1, +S2, regular, no murmurs  RESP:  + L sided chest tube  GI:  large ventral hernia, soft, NT  EXT:  no clubbing, no cyanosis, no edema, no calf pain, swelling or erythema  NEURO:  AAOX3, no focal neurological deficits, follows all commands, able to move extremities spontaneously  SKIN:  no ulcers, lesions or rashes    LABS:                          12.4   9.42  )-----------( 299      ( 17 Mar 2025 07:14 )             37.6     03-17    138  |  108  |  17  ----------------------------<  113[H]  3.6   |  24  |  1.02    Ca    8.8      17 Mar 2025 07:14  Phos  5.0     03-17  Mg     2.0     03-17    TPro  7.3  /  Alb  3.6  /  TBili  1.2  /  DBili  x   /  AST  13[L]  /  ALT  18  /  AlkPhos  104  03-16        LIVER FUNCTIONS - ( 16 Mar 2025 16:18 )  Alb: 3.6 g/dL / Pro: 7.3 gm/dL / ALK PHOS: 104 U/L / ALT: 18 U/L / AST: 13 U/L / GGT: x               Urinalysis Basic - ( 17 Mar 2025 07:14 )    Color: x / Appearance: x / SG: x / pH: x  Gluc: 113 mg/dL / Ketone: x  / Bili: x / Urobili: x   Blood: x / Protein: x / Nitrite: x   Leuk Esterase: x / RBC: x / WBC x   Sq Epi: x / Non Sq Epi: x / Bacteria: x            RADIOLOGY STUDIES:  CTA PE protocol, CT a/p w iv contrast 3/16/25: Large left pneumothorax without volume loss in the setting of COPD. Tension pneumothorax cannot be excluded. This critical value was discussed with MOON Nguyen in the emergency room at 5:20 PM on 3/16/2025. Clinical correlation is necessary. Atelectasis of the left lower lobe and mild left pleural fluid. Large ventral hernia containing small and large bowel without obstruction.

## 2025-03-17 NOTE — CONSULT NOTE ADULT - ASSESSMENT
74F, has not seen a doctor in 14 years, current smoker/vape, p/w SOB worsening over 2 wks, foun dto have large L PTX, now s/p L sided pigtail.    #Large Left PTX- possibly in setting of COPD w blebs  -s/p L sided pigtail, care per thoracic    #likely undx'd COPD  -current smoker  -will need inhaler regimen (pulm also consulted, discussed w Dr. Torres, will defer to pulm)  -will need outpt pulm for formal PFTs    #Elevated BP on admission  -likely in setting of distress 2/2 PTX  -now normotensive    #Large ventral hernia  -no current or prior sx per pt  -outpt f/u    #Routine screening and care  -will check A1c, TSH, lipid panel  -referral to PCP on d/c    #DVT ppx- SQL    Will follow
    74 years old female current smoker with prior history of emphysema no formal treatment for COPD came with secondary spontaneous pneumothorax.  Patient having upper respiratory tract infection for last 2 weeks and was refusing hospital admission.  In the ER she was found to have large left pneumothorax and received a left-sided pigtail.  Pulmonary was consulted.      Problems.  Secondary spontaneous pneumothorax left side.  Untreated COPD/emphysema with recent exacerbation.  Bilateral atelectasis and left pleural effusion likely reactive.  History of smoking.      Recommendations.  Chest tube management per thoracic surgery.  Start oral prednisone 20 mg daily for 5 days.  Start Stiolto 2 puffs daily.  DuoNebs every 8 hourly.  DVT prophylaxis.  Patient will need outpatient pulmonary follow-up.  Prior to discharge please repeat a noncontrast CT scan of the chest

## 2025-03-18 PROCEDURE — 71045 X-RAY EXAM CHEST 1 VIEW: CPT | Mod: 26

## 2025-03-18 PROCEDURE — 99233 SBSQ HOSP IP/OBS HIGH 50: CPT

## 2025-03-18 PROCEDURE — 71250 CT THORAX DX C-: CPT | Mod: 26

## 2025-03-18 PROCEDURE — 99232 SBSQ HOSP IP/OBS MODERATE 35: CPT

## 2025-03-18 PROCEDURE — 99231 SBSQ HOSP IP/OBS SF/LOW 25: CPT

## 2025-03-18 PROCEDURE — 71045 X-RAY EXAM CHEST 1 VIEW: CPT | Mod: 26,77

## 2025-03-18 RX ORDER — SENNA 187 MG
2 TABLET ORAL AT BEDTIME
Refills: 0 | Status: DISCONTINUED | OUTPATIENT
Start: 2025-03-18 | End: 2025-03-20

## 2025-03-18 RX ADMIN — ENOXAPARIN SODIUM 30 MILLIGRAM(S): 100 INJECTION SUBCUTANEOUS at 09:36

## 2025-03-18 RX ADMIN — OXYCODONE HYDROCHLORIDE 10 MILLIGRAM(S): 30 TABLET ORAL at 05:09

## 2025-03-18 RX ADMIN — TIOTROPIUM BROMIDE AND OLODATEROL 2 PUFF(S): 3.124; 2.736 SPRAY, METERED RESPIRATORY (INHALATION) at 09:59

## 2025-03-18 RX ADMIN — OXYCODONE HYDROCHLORIDE 10 MILLIGRAM(S): 30 TABLET ORAL at 15:02

## 2025-03-18 RX ADMIN — OXYCODONE HYDROCHLORIDE 10 MILLIGRAM(S): 30 TABLET ORAL at 06:09

## 2025-03-18 RX ADMIN — OXYCODONE HYDROCHLORIDE 10 MILLIGRAM(S): 30 TABLET ORAL at 16:02

## 2025-03-18 RX ADMIN — PREDNISONE 20 MILLIGRAM(S): 20 TABLET ORAL at 09:36

## 2025-03-18 NOTE — PROGRESS NOTE ADULT - ASSESSMENT
74F, has not seen a doctor in 14 years, current smoker/vape, p/w SOB worsening over 2 wks, foun dto have large L PTX, now s/p L sided pigtail.    #Large Left PTX- possibly in setting of COPD w blebs  -s/p L sided pigtail, care per thoracic  s/p removal 3/18  CT chest awaited    #likely undx'd COPD  -current smoker  -will need inhaler regimen (pulm also consulted, discussed w Dr. Torres, will defer to pulm)  -will need outpt pulm for formal PFTs  CT chest     #Elevated BP on admission  -likely in setting of distress 2/2 PTX  -now normotensive    #Large ventral hernia  -no current or prior sx per pt  -outpt f/u    #Routine screening and care  -will check A1c, TSH, lipid panel  -referral to PCP on d/c    #DVT ppx- SQL

## 2025-03-18 NOTE — DIETITIAN INITIAL EVALUATION ADULT - ADD RECOMMEND
1) C/w regular diet as tolerated  2) Encourage protein-rich foods, maximize food preferences; denies ALL ONS at this time  3) Monitor bowel movements, if no BM for >3 days, consider implementing bowel regimen.  4) MVI w/ minerals daily to ensure 100% RDA met  5) Monitor lytes/ min and replete prn.  6) Please obtain NFPE if/when medically feasible - remains at HIGH RISK for malnutrition  7) Confirm goals of care regarding nutrition support  RD will continue to monitor PO intake, labs, hydration, and wt prn.

## 2025-03-18 NOTE — DIETITIAN INITIAL EVALUATION ADULT - PERTINENT LABORATORY DATA
03-17    138  |  108  |  17  ----------------------------<  113[H]  3.6   |  24  |  1.02    Ca    8.8      17 Mar 2025 07:14  Phos  5.0     03-17  Mg     2.0     03-17    TPro  7.3  /  Alb  3.6  /  TBili  1.2  /  DBili  x   /  AST  13[L]  /  ALT  18  /  AlkPhos  104  03-16  A1C with Estimated Average Glucose Result: 5.8 % (03-17-25 @ 07:14)

## 2025-03-18 NOTE — DIETITIAN INITIAL EVALUATION ADULT - ORAL INTAKE PTA/DIET HISTORY
Lives at home w/ son; both cook/grocery shop w/o difficulty. Endorses "fine" appetite and consumes 2-3 meals/day. Likely consuming fair PO intake of 50-75% of ENN x > 1 mo.

## 2025-03-18 NOTE — PROGRESS NOTE ADULT - SUBJECTIVE AND OBJECTIVE BOX
3/18: no new complaints  pig tail removed      PHYSICAL EXAM:    Daily     Daily     Vital Signs Last 24 Hrs  T(C): 36.5 (18 Mar 2025 12:22), Max: 36.8 (17 Mar 2025 23:54)  T(F): 97.7 (18 Mar 2025 12:22), Max: 98.2 (17 Mar 2025 23:54)  HR: 76 (18 Mar 2025 12:22) (70 - 97)  BP: 152/58 (18 Mar 2025 12:22) (112/73 - 175/80)  BP(mean): 83 (18 Mar 2025 12:22) (83 - 83)  RR: 18 (18 Mar 2025 12:22) (18 - 18)  SpO2: 95% (18 Mar 2025 12:22) (93% - 96%)    Constitutional: Weak and ill appearing  HEENT: Atraumatic, THADDEUS,   Respiratory: Breath Sounds normal, no rhonchi/wheeze  Cardiovascular: N S1S2;   Gastrointestinal: Abdomen soft, non tender, Bowel Sounds present  Extremities: No edema, peripheral pulses present  Neurological: AAO x 3, no gross focal motor deficits  Skin: Non cellulitic, no rash, ulcers  Lymph Nodes: No lymphadenopathy noted  Back: No CVA tenderness   Musculoskeletal: non tender  Breasts: Deferred  Genitourinary: deferred  Rectal: Deferred    All Labs/EKG/Radiology/Meds reviewed by me                          12.4   9.42  )-----------( 299      ( 17 Mar 2025 07:14 )             37.6       CBC Full  -  ( 17 Mar 2025 07:14 )  WBC Count : 9.42 K/uL  RBC Count : 4.17 M/uL  Hemoglobin : 12.4 g/dL  Hematocrit : 37.6 %  Platelet Count - Automated : 299 K/uL  Mean Cell Volume : 90.2 fl  Mean Cell Hemoglobin : 29.7 pg  Mean Cell Hemoglobin Concentration : 33.0 g/dL  Auto Neutrophil # : x  Auto Lymphocyte # : x  Auto Monocyte # : x  Auto Eosinophil # : x  Auto Basophil # : x  Auto Neutrophil % : x  Auto Lymphocyte % : x  Auto Monocyte % : x  Auto Eosinophil % : x  Auto Basophil % : x      03-17    138  |  108  |  17  ----------------------------<  113[H]  3.6   |  24  |  1.02    Ca    8.8      17 Mar 2025 07:14  Phos  5.0     03-17  Mg     2.0     03-17                      Urinalysis Basic - ( 17 Mar 2025 07:14 )    Color: x / Appearance: x / SG: x / pH: x  Gluc: 113 mg/dL / Ketone: x  / Bili: x / Urobili: x   Blood: x / Protein: x / Nitrite: x   Leuk Esterase: x / RBC: x / WBC x   Sq Epi: x / Non Sq Epi: x / Bacteria: x            MEDICATIONS  (STANDING):  enoxaparin Injectable 30 milliGRAM(s) SubCutaneous every 24 hours  morphine  - Injectable 2.5 milliGRAM(s) IV Push once  predniSONE   Tablet 20 milliGRAM(s) Oral daily  senna 2 Tablet(s) Oral at bedtime  tiotropium 2.5 MICROgram(s)/olodaterol 2.5 MICROgram(s) (STIOLTO) Inhaler 2 Puff(s) Inhalation daily    MEDICATIONS  (PRN):  acetaminophen     Tablet .. 650 milliGRAM(s) Oral every 6 hours PRN Mild Pain (1 - 3)  albuterol/ipratropium for Nebulization 3 milliLiter(s) Nebulizer every 6 hours PRN Shortness of Breath and/or Wheezing  diazepam    Tablet 5 milliGRAM(s) Oral every 8 hours PRN muscle spasm  oxyCODONE    IR 5 milliGRAM(s) Oral every 4 hours PRN Moderate Pain (4 - 6)  oxyCODONE    IR 10 milliGRAM(s) Oral every 4 hours PRN Severe Pain (7 - 10)

## 2025-03-18 NOTE — PROGRESS NOTE ADULT - SUBJECTIVE AND OBJECTIVE BOX
Subjective:  Pt seen, chest tube removed earlier today. C/o chest pain, morphine x 1 dose given. CT chest done    Vital Signs:  Vital Signs Last 24 Hrs  T(C): 36.7 (25 @ 16:20), Max: 36.8 (25 @ 23:54)  T(F): 98 (25 @ 16:20), Max: 98.2 (25 @ 23:54)  HR: 85 (25 @ 16:20) (70 - 97)  BP: 122/85 (25 @ 16:20) (112/73 - 175/80)  RR: 18 (25 @ 16:20) (18 - 18)  SpO2: 94% (25 @ 16:20) (93% - 95%) on (O2)    Telemetry/Alarms:    Relevant labs, radiology and Medications reviewed                        12.4   9.42  )-----------( 299      ( 17 Mar 2025 07:14 )             37.6         138  |  108  |  17  ----------------------------<  113[H]  3.6   |  24  |  1.02    Ca    8.8      17 Mar 2025 07:14  Phos  5.0       Mg     2.0         < from: CT Chest No Cont (25 @ 17:05) >  FINDINGS:    AIRWAYS AND LUNGS: The central tracheobronchial tree is patent.    Emphysema is present. Resolution prior left pneumothorax.   Ill defined   left lung opacity may be related to atelectasis or reexpansion edema.   Subtle opacity posterior right upper lobe. Small bilateral pleural   effusions. There is high density of the superior part of the right major   fissure that may be post procedural.    MEDIASTINUM AND PLEURA: There are no enlarged mediastinal, hilar or   axillary lymph nodes. The visualized portion of the thyroid gland is   heterogeneous.    HEART AND VESSELS: There is mild cardiomegaly.  There are atherosclerotic   calcifications of the aorta and coronary arteries.  There is no   pericardial effusion.    UPPER ABDOMEN: Images of the upper abdomen demonstrate hepatic cysts.    BONES AND SOFT TISSUES: The bones are unremarkable.  The soft tissues are   unremarkable.    IMPRESSION:  Emphysema is present. Resolution prior left pneumothorax.   Ill defined   left lung opacity may be related to atelectasis or reexpansion edema.   Subtle opacity posterior right upper lobe. Small bilateral pleural   effusions. There is high density of the superior part of the right major   fissure that may be post procedural. Continued follow-up CT recommended.    < end of copied text >      MEDICATIONS  (STANDING):  enoxaparin Injectable 30 milliGRAM(s) SubCutaneous every 24 hours  predniSONE   Tablet 20 milliGRAM(s) Oral daily  senna 2 Tablet(s) Oral at bedtime  tiotropium 2.5 MICROgram(s)/olodaterol 2.5 MICROgram(s) (STIOLTO) Inhaler 2 Puff(s) Inhalation daily    MEDICATIONS  (PRN):  acetaminophen     Tablet .. 650 milliGRAM(s) Oral every 6 hours PRN Mild Pain (1 - 3)  albuterol/ipratropium for Nebulization 3 milliLiter(s) Nebulizer every 6 hours PRN Shortness of Breath and/or Wheezing  diazepam    Tablet 5 milliGRAM(s) Oral every 8 hours PRN muscle spasm  oxyCODONE    IR 5 milliGRAM(s) Oral every 4 hours PRN Moderate Pain (4 - 6)  oxyCODONE    IR 10 milliGRAM(s) Oral every 4 hours PRN Severe Pain (7 - 10)      Physical exam  Gen NAD  Neuro  AAOx3  Card RRR  Pulm equal expansion,   Abd soft  Ext warm    I&O's Summary    17 Mar 2025 07:01  -  18 Mar 2025 07:00  --------------------------------------------------------  IN: 0 mL / OUT: 40 mL / NET: -40 mL        Assessment  74y Female  w/ PAST MEDICAL & SURGICAL HISTORY:  No pertinent past medical history      Emphysema/COPD      H/O  section      admitted with complaints of Patient is a 74y old  Female who presents with a chief complaint of left Pneumothorax (18 Mar 2025 16:28)  .    4 y/o F , non compliant, + COPD with large left pneumothorax. s/p Left pigtail insertion 3/16/25    Left pigtial removed on full inspiration and occlusive dressing placed. Post pull CXR no PTX.  has not seen HCP in ~14 years,   medical w/u  transferred to medical service,   pulmonary following on steroids and pulmonary meds  wean O2 as able, may need home O2 on d/c  no further thoracic intervention      Discussed with Cardiothoracic Team at AM rounds.

## 2025-03-18 NOTE — DIETITIAN INITIAL EVALUATION ADULT - OTHER INFO
75 y/o F who [facility] to [facility] in over [facility], as per her sister she has not been breathing well the last 2 weeks and has been getting progressively worse. The patient has been refusing to come to the hospital. Pt sister finally convinced her to go and on workup noted a large left PTX.  Left Pigtail was placed at bedside with complete expansion of lung.  Admit for large ventral hernia, w/ underlying COPD     Eating breakfast at time of visit (scrambled eggs, potatoes, Amharic toast). Reports improving appetite and eating well since admit (x 2 days). Reports UBW of ~120-130# within last several months. Bed scale wt of 121# taken by RD on 3/18/25; no clinically significant wt changes noted at this time. Unable to obtain NFPE at this time 2/2 refused, "wants to eat breakfast in peace." Please obtain NFPE when/if medically feasible - remains at HIGH RISK for malnutrition. C/w regular diet as tolerated. Denies ALL ONS at this time despite max encouragement. RD provided verbal nutrition education on reducing symptoms like acid reflux, bloating and discomfort 2/2 hernia - pt is receptive. Encourage protein-rich foods, maximize food preferences. See below for other recommendations.

## 2025-03-18 NOTE — DIETITIAN INITIAL EVALUATION ADULT - PROBLEM SELECTOR PLAN 1
Admit to Dr Mejia service   Left pigtail to -20 LWCS  pain medication  IS  Hospitalist consult   Pulmonology consult   Lovenox DVTP   Bed rest   Valium PRN   COPD- nebs prn  DW DR Higginbotham

## 2025-03-18 NOTE — DIETITIAN INITIAL EVALUATION ADULT - REASON INDICATOR FOR ASSESSMENT
Post-Care Instructions: I reviewed with the patient in detail post-care instructions. Patient is to wear sunprotection, and avoid picking at any of the treated lesions. Pt may apply Vaseline to crusted or scabbing areas. Duration Of Freeze Thaw-Cycle (Seconds): 5 Render Note In Bullet Format When Appropriate: No Application Tool (Optional): Liquid Nitrogen Sprayer Detail Level: Detailed Consent: The patient's consent was obtained including but not limited to risks of crusting, scabbing, blistering, scarring, darker or lighter pigmentary change, recurrence, incomplete removal and infection. HR; low BMI  Show Applicator Variable?: Yes Number Of Freeze-Thaw Cycles: 2 freeze-thaw cycles

## 2025-03-18 NOTE — PROGRESS NOTE ADULT - SUBJECTIVE AND OBJECTIVE BOX
Interval/Overnight Events:  Patient denied chest pain.  Remains on oxygen.  Denies productive cough hemoptysis or fever reports some subjective improvement          FLUIDS/ELECTROLYTES/NUTRITION:  I&O's Summary    17 Mar 2025 07:01  -  18 Mar 2025 07:00  --------------------------------------------------------  IN: 0 mL / OUT: 40 mL / NET: -40 mL        ICU Vital Signs Last 24 Hrs  T(C): 36.2 (18 Mar 2025 20:17), Max: 36.8 (17 Mar 2025 23:54)  T(F): 97.2 (18 Mar 2025 20:17), Max: 98.2 (17 Mar 2025 23:54)  HR: 86 (18 Mar 2025 20:17) (70 - 88)  BP: 124/83 (18 Mar 2025 20:17) (112/73 - 175/80)  BP(mean): 83 (18 Mar 2025 12:22) (83 - 83)  RR: 18 (18 Mar 2025 20:17) (18 - 18)  SpO2: 94% (18 Mar 2025 20:17) (94% - 95%)    O2 Parameters below as of 18 Mar 2025 20:17  Patient On (Oxygen Delivery Method): nasal cannula  O2 Flow (L/min): 3      I&O's Summary    17 Mar 2025 07:01  -  18 Mar 2025 07:00  --------------------------------------------------------  IN: 0 mL / OUT: 40 mL / NET: -40 mL      MEDICATIONS  (STANDING):  enoxaparin Injectable 30 milliGRAM(s) SubCutaneous every 24 hours  predniSONE   Tablet 20 milliGRAM(s) Oral daily  senna 2 Tablet(s) Oral at bedtime  tiotropium 2.5 MICROgram(s)/olodaterol 2.5 MICROgram(s) (STIOLTO) Inhaler 2 Puff(s) Inhalation daily    MEDICATIONS  (PRN):  acetaminophen     Tablet .. 650 milliGRAM(s) Oral every 6 hours PRN Mild Pain (1 - 3)  albuterol/ipratropium for Nebulization 3 milliLiter(s) Nebulizer every 6 hours PRN Shortness of Breath and/or Wheezing  diazepam    Tablet 5 milliGRAM(s) Oral every 8 hours PRN muscle spasm  oxyCODONE    IR 5 milliGRAM(s) Oral every 4 hours PRN Moderate Pain (4 - 6)  oxyCODONE    IR 10 milliGRAM(s) Oral every 4 hours PRN Severe Pain (7 - 10)                            12.4   9.42  )-----------( 299      ( 17 Mar 2025 07:14 )             37.6     03-17    138  |  108  |  17  ----------------------------<  113[H]  3.6   |  24  |  1.02    Ca    8.8      17 Mar 2025 07:14  Phos  5.0     03-17  Mg     2.0     03-17    GENERAL APPEARANCE:  Pt. is alert, oriented, and pleasant. No Respiratory  distress.   Well-hydrated    On oxygen via nc   HEENT:  normocephalic,atraumatic,  Pupils are normal and react normally. No icterus.  Mucous membranes of oral cavity is  moist .   NECK:  Supple. No cervical or supraclavicular adenopathy. No jugular venous distension      HEART:  . Normal S1 and S2.   CHEST:    No wheeze, no crackles , No use of accessory muscles Left-sided chest tube.  Tidaling.  Prolonged expiration.    ABDOMEN:  Soft and  Distended epigastric hernia nonstrangulated  EXTREMITIES:  There is no cyanosis, clubbing or edema.   SKIN:  No rash or significant lesions are noted.  NEUROLOGIC:  Oriented x 3.  Appropriate mood and affect. No focal deficits    RADIOLOGY & ADDITIONAL STUDIES:   < from: Xray Chest 1 View- PORTABLE-Routine (Xray Chest 1 View- PORTABLE-Routine in AM.) (03.17.25 @ 07:00) >  New hazy right upper lobe opacity.  Left pleural pigtail catheter with tip projecting over the periphery of   the left mid hemithorax. Reexpansion ofmuch of the left lung with a   small residual left apical pneumothorax.  There is left lower lung platelike and subsegmental atelectasis.  There is left subcutaneous emphysema.          < end of copied text >  < from: CT Angio Chest PE Protocol w/ IV Cont (03.16.25 @ 17:05) >  Large left pneumothorax without volume loss in the setting of COPD.   Tension pneumothorax cannot be excluded. This critical value was   discussed with MOON Nguyen in the emergency room at 5:20 PM on 3/16/2025.   Clinical correlation is necessary.    Atelectasis of the left lower lobe and mild left pleural fluid.    Large ventral hernia containing small and large bowel without obstruction.      < end of copied text >  < from: CT Chest No Cont (03.18.25 @ 17:05) >  IMPRESSION:  Emphysema is present. Resolution prior left pneumothorax.   Ill defined   left lung opacity may be related to atelectasis or reexpansion edema.   Subtle opacity posterior right upper lobe. Small bilateral pleural   effusions. There is high density of the superior part of the right major   fissure that may be post procedural    < end of copied text >        Interval/Overnight Events:  Patient denied chest pain.  Remains on oxygen.  Denies productive cough hemoptysis or fever reports some subjective improvement  FLUIDS/ELECTROLYTES/NUTRITION:  I&O's Summary    17 Mar 2025 07:01  -  18 Mar 2025 07:00  --------------------------------------------------------  IN: 0 mL / OUT: 40 mL / NET: -40 mL        ICU Vital Signs Last 24 Hrs  T(C): 36.2 (18 Mar 2025 20:17), Max: 36.8 (17 Mar 2025 23:54)  T(F): 97.2 (18 Mar 2025 20:17), Max: 98.2 (17 Mar 2025 23:54)  HR: 86 (18 Mar 2025 20:17) (70 - 88)  BP: 124/83 (18 Mar 2025 20:17) (112/73 - 175/80)  BP(mean): 83 (18 Mar 2025 12:22) (83 - 83)  RR: 18 (18 Mar 2025 20:17) (18 - 18)  SpO2: 94% (18 Mar 2025 20:17) (94% - 95%)    O2 Parameters below as of 18 Mar 2025 20:17  Patient On (Oxygen Delivery Method): nasal cannula  O2 Flow (L/min): 3      I&O's Summary    17 Mar 2025 07:01  -  18 Mar 2025 07:00  --------------------------------------------------------  IN: 0 mL / OUT: 40 mL / NET: -40 mL      MEDICATIONS  (STANDING):  enoxaparin Injectable 30 milliGRAM(s) SubCutaneous every 24 hours  predniSONE   Tablet 20 milliGRAM(s) Oral daily  senna 2 Tablet(s) Oral at bedtime  tiotropium 2.5 MICROgram(s)/olodaterol 2.5 MICROgram(s) (STIOLTO) Inhaler 2 Puff(s) Inhalation daily    MEDICATIONS  (PRN):  acetaminophen     Tablet .. 650 milliGRAM(s) Oral every 6 hours PRN Mild Pain (1 - 3)  albuterol/ipratropium for Nebulization 3 milliLiter(s) Nebulizer every 6 hours PRN Shortness of Breath and/or Wheezing  diazepam    Tablet 5 milliGRAM(s) Oral every 8 hours PRN muscle spasm  oxyCODONE    IR 5 milliGRAM(s) Oral every 4 hours PRN Moderate Pain (4 - 6)  oxyCODONE    IR 10 milliGRAM(s) Oral every 4 hours PRN Severe Pain (7 - 10)                            12.4   9.42  )-----------( 299      ( 17 Mar 2025 07:14 )             37.6     03-17    138  |  108  |  17  ----------------------------<  113[H]  3.6   |  24  |  1.02    Ca    8.8      17 Mar 2025 07:14  Phos  5.0     03-17  Mg     2.0     03-17    GENERAL APPEARANCE:  Pt. is alert, oriented, and pleasant. No Respiratory  distress.   Well-hydrated    On oxygen via nc   HEENT:  normocephalic,atraumatic,  Pupils are normal and react normally. No icterus.  Mucous membranes of oral cavity is  moist .   NECK:  Supple. No cervical or supraclavicular adenopathy. No jugular venous distension      HEART:  . Normal S1 and S2.   CHEST:    No wheeze, no crackles , No use of accessory muscles Left-sided chest tube.  Tidaling.  Prolonged expiration.    ABDOMEN:  Soft and  Distended epigastric hernia nonstrangulated  EXTREMITIES:  There is no cyanosis, clubbing or edema.   SKIN:  No rash or significant lesions are noted.  NEUROLOGIC:  Oriented x 3.  Appropriate mood and affect. No focal deficits    RADIOLOGY & ADDITIONAL STUDIES:   < from: Xray Chest 1 View- PORTABLE-Routine (Xray Chest 1 View- PORTABLE-Routine in AM.) (03.17.25 @ 07:00) >  New hazy right upper lobe opacity.  Left pleural pigtail catheter with tip projecting over the periphery of   the left mid hemithorax. Reexpansion ofmuch of the left lung with a   small residual left apical pneumothorax.  There is left lower lung platelike and subsegmental atelectasis.  There is left subcutaneous emphysema.          < end of copied text >  < from: CT Angio Chest PE Protocol w/ IV Cont (03.16.25 @ 17:05) >  Large left pneumothorax without volume loss in the setting of COPD.   Tension pneumothorax cannot be excluded. This critical value was   discussed with MOON Nguyen in the emergency room at 5:20 PM on 3/16/2025.   Clinical correlation is necessary.    Atelectasis of the left lower lobe and mild left pleural fluid.    Large ventral hernia containing small and large bowel without obstruction.      < end of copied text >  < from: CT Chest No Cont (03.18.25 @ 17:05) >  IMPRESSION:  Emphysema is present. Resolution prior left pneumothorax.   Ill defined   left lung opacity may be related to atelectasis or reexpansion edema.   Subtle opacity posterior right upper lobe. Small bilateral pleural   effusions. There is high density of the superior part of the right major   fissure that may be post procedural    < end of copied text >

## 2025-03-18 NOTE — PROGRESS NOTE ADULT - ASSESSMENT
74 years old female current smoker with prior history of emphysema no formal treatment for COPD came with secondary spontaneous pneumothorax.  Patient having upper respiratory tract infection for last 2 weeks and was refusing hospital admission.  In the ER she was found to have large left pneumothorax and received a left-sided pigtail.  Pulmonary was consulted.      Problems.  Secondary spontaneous pneumothorax left side.  Untreated COPD/emphysema with recent exacerbation.  Bilateral atelectasis and left pleural effusion likely reactive.  History of smoking.      Recommendations.  Chest tube management per thoracic surgery.  oral prednisone 20 mg daily for 5 days.  Start Stiolto 2 puffs daily.  DuoNebs every 8 hourly.  DVT prophylaxis.  Patient will need outpatient pulmonary follow-up.  Follow-up CT scan reviewed.  Patient will need outpatient pulmonary follow-up.  Information was given to patient.  Smoking cessation counseling provided.         74 years old female current smoker with prior history of emphysema no formal treatment for COPD came with secondary spontaneous pneumothorax.  Patient having upper respiratory tract infection for last 2 weeks and was refusing hospital admission.  In the ER she was found to have large left pneumothorax and received a left-sided pigtail.  Pulmonary was consulted.      Problems.  Secondary spontaneous pneumothorax left side.  Untreated COPD/emphysema with recent exacerbation.Severe emphysema noticed on follow-up CT scan of the chest  Bilateral atelectasis and left pleural effusion likely reactive.  History of smoking.  Fluid in the right minor fissure and bilateral atelectasis and bullous lung disease    Recommendations.  Chest tube management per thoracic surgery.Patient has severe bullous lung disease may consider pleurodesis of left side while the chest tube is in place  oral prednisone 20 mg daily for 5 days.  Start Stiolto 2 puffs daily.  DuoNebs every 8 hourly.  DVT prophylaxis.  Follow-up CT scan reviewed. Findings consistent with severe emphysematous lung with fluid overload or pulmonary hypertension.  Please consider a nonurgent echocardiography.  Home oxygen evaluation prior to discharge.  Patient will need outpatient pulmonary follow-up. I have given them information  Information was given to patient.  Smoking cessation counseling provided.

## 2025-03-18 NOTE — DIETITIAN INITIAL EVALUATION ADULT - NSFNSGIIOFT_GEN_A_CORE
03-17-25 @ 07:01  -  03-18-25 @ 07:00  --------------------------------------------------------  OUT:    Chest Tube (mL): 40 mL  Total OUT: 40 mL    Total NET: -40 mL

## 2025-03-18 NOTE — DIETITIAN INITIAL EVALUATION ADULT - PERTINENT MEDS FT
MEDICATIONS  (STANDING):  enoxaparin Injectable 30 milliGRAM(s) SubCutaneous every 24 hours  predniSONE   Tablet 20 milliGRAM(s) Oral daily  tiotropium 2.5 MICROgram(s)/olodaterol 2.5 MICROgram(s) (STIOLTO) Inhaler 2 Puff(s) Inhalation daily    MEDICATIONS  (PRN):  acetaminophen     Tablet .. 650 milliGRAM(s) Oral every 6 hours PRN Mild Pain (1 - 3)  albuterol/ipratropium for Nebulization 3 milliLiter(s) Nebulizer every 6 hours PRN Shortness of Breath and/or Wheezing  diazepam    Tablet 5 milliGRAM(s) Oral every 8 hours PRN muscle spasm  oxyCODONE    IR 5 milliGRAM(s) Oral every 4 hours PRN Moderate Pain (4 - 6)  oxyCODONE    IR 10 milliGRAM(s) Oral every 4 hours PRN Severe Pain (7 - 10)

## 2025-03-19 PROCEDURE — 99232 SBSQ HOSP IP/OBS MODERATE 35: CPT

## 2025-03-19 RX ADMIN — ENOXAPARIN SODIUM 30 MILLIGRAM(S): 100 INJECTION SUBCUTANEOUS at 09:56

## 2025-03-19 RX ADMIN — TIOTROPIUM BROMIDE AND OLODATEROL 2 PUFF(S): 3.124; 2.736 SPRAY, METERED RESPIRATORY (INHALATION) at 08:02

## 2025-03-19 RX ADMIN — PREDNISONE 20 MILLIGRAM(S): 20 TABLET ORAL at 09:55

## 2025-03-19 NOTE — PROGRESS NOTE ADULT - ASSESSMENT
74F, has not seen a doctor in 14 years, current smoker/vape, p/w SOB worsening over 2 wks, foun dto have large L PTX, now s/p L sided pigtail.    #Large Left PTX- possibly in setting of COPD w blebs  -s/p L sided pigtail, care per thoracic  s/p removal 3/18  CT chest noted  pulmo f/u    #likely undx'd COPD  -current smoker  -will need inhaler regimen (pulm also consulted, discussed w Dr. Torres, will defer to pulm)  -will need outpt pulm for formal PFTs  CT chest   Home O2 eval done  needs 3 L/min nc      #Elevated BP on admission  -likely in setting of distress 2/2 PTX  -now normotensive    #Large ventral hernia  -no current or prior sx per pt  -outpt f/u    #Routine screening and care  -will check A1c, TSH, lipid panel  -referral to PCP on d/c    #DVT ppx- SQL    DISPO: pulmo f/u  d/c plan home 3/20 on home O2

## 2025-03-19 NOTE — PROGRESS NOTE ADULT - ASSESSMENT
74 years old female current smoker with prior history of emphysema no formal treatment for COPD came with secondary spontaneous pneumothorax.  Patient having upper respiratory tract infection for last 2 weeks and was refusing hospital admission.  In the ER she was found to have large left pneumothorax and received a left-sided pigtail.  Pulmonary was consulted.  Left-sided pigtail drainage is removed    Problems.  Secondary spontaneous pneumothorax left side.  Untreated COPD/emphysema with recent exacerbation.Severe emphysema noticed on follow-up CT scan of the chest  Bilateral atelectasis and left pleural effusion likely reactive.  History of smoking.  Fluid in the right minor fissure and bilateral atelectasis and bullous lung disease    Recommendations.  oral prednisone 20 mg daily for 5 days.  Stiolto 2 puffs daily.  DuoNebs every 8 hourly.  DVT prophylaxis.  Follow-up CT scan reviewed. Findings consistent with severe emphysematous lung with fluid overload or pulmonary hypertension.  Please consider a nonurgent echocardiography.  Smoking cessation counseling provided.    Follow-up in pulmonary outpatient.  Pulmonary will sign off please call if needed.  Home oxygen evaluation reviewed.

## 2025-03-19 NOTE — PROGRESS NOTE ADULT - REASON FOR ADMISSION
left Pneumothorax

## 2025-03-19 NOTE — PROGRESS NOTE ADULT - ASSESSMENT
73 y/o F , non compliant, + COPD with large left pneumothorax. s/p Left pigtail insertion 3/16/25      Plan   CT scan reviewed with Dr Yeboah   No further thoracic interventions needed at this time  PT can follow up with Dr Yeboah as outpatient if needed   pulmonary following on steroids and pulmonary meds  wean O2 as able, may need home O2 on d/c  Will sign off please reconsult if needed     Discussed with Cardiothoracic Team at AM rounds.

## 2025-03-19 NOTE — PROGRESS NOTE ADULT - SUBJECTIVE AND OBJECTIVE BOX
Interval/Overnight Events:  Denied chest pain   continues to have cough   chest tube is out  ICU Vital Signs Last 24 Hrs  T(C): 36.7 (19 Mar 2025 20:08), Max: 36.8 (19 Mar 2025 00:25)  T(F): 98.1 (19 Mar 2025 20:08), Max: 98.2 (19 Mar 2025 00:25)  HR: 81 (19 Mar 2025 20:08) (66 - 81)  BP: 132/71 (19 Mar 2025 20:08) (100/53 - 151/85)  BP(mean): 65 (19 Mar 2025 09:15) (65 - 89)  RR: 18 (19 Mar 2025 20:08) (17 - 18)  SpO2: 94% (19 Mar 2025 20:08) (93% - 95%)  O2 Parameters below as of 19 Mar 2025 20:08  Patient On (Oxygen Delivery Method): nasal cannula  O2 Flow (L/min): 3      CAPILLARY BLOOD GLUCOSE  MEDICATIONS  (STANDING):  enoxaparin Injectable 30 milliGRAM(s) SubCutaneous every 24 hours  predniSONE   Tablet 20 milliGRAM(s) Oral daily  senna 2 Tablet(s) Oral at bedtime  tiotropium 2.5 MICROgram(s)/olodaterol 2.5 MICROgram(s) (STIOLTO) Inhaler 2 Puff(s) Inhalation daily      MEDICATIONS  (PRN):  acetaminophen     Tablet .. 650 milliGRAM(s) Oral every 6 hours PRN Mild Pain (1 - 3)  albuterol/ipratropium for Nebulization 3 milliLiter(s) Nebulizer every 6 hours PRN Shortness of Breath and/or Wheezing  diazepam    Tablet 5 milliGRAM(s) Oral every 8 hours PRN muscle spasm  oxyCODONE    IR 5 milliGRAM(s) Oral every 4 hours PRN Moderate Pain (4 - 6)  oxyCODONE    IR 10 milliGRAM(s) Oral every 4 hours PRN Severe Pain (7 - 10)      GENERAL APPEARANCE:  Pt. is alert, oriented, and pleasant. No Respiratory  distress.   Well-hydrated    On oxygen via nc   HEENT:  normocephalic,atraumatic,  Pupils are normal and react normally. No icterus.  Mucous membranes of oral cavity is  moist .   NECK:  Supple. No cervical or supraclavicular adenopathy. No jugular venous distension      HEART:  . Normal S1 and S2.   CHEST:    No wheeze, no crackles , No use of accessory muscles Left-sided chest tube.  Tidaling.  Prolonged expiration.    ABDOMEN:  Soft and  Distended epigastric hernia nonstrangulated  EXTREMITIES:  There is no cyanosis, clubbing or edema.   SKIN:  No rash or significant lesions are noted.  NEUROLOGIC:  Oriented x 3.  Appropriate mood and affect. No focal deficits    RADIOLOGY & ADDITIONAL STUDIES:   < from: Xray Chest 1 View- PORTABLE-Routine (Xray Chest 1 View- PORTABLE-Routine in AM.) (03.17.25 @ 07:00) >  New hazy right upper lobe opacity.  Left pleural pigtail catheter with tip projecting over the periphery of   the left mid hemithorax. Reexpansion ofmuch of the left lung with a   small residual left apical pneumothorax.  There is left lower lung platelike and subsegmental atelectasis.  There is left subcutaneous emphysema.          < end of copied text >  < from: CT Angio Chest PE Protocol w/ IV Cont (03.16.25 @ 17:05) >  Large left pneumothorax without volume loss in the setting of COPD.   Tension pneumothorax cannot be excluded. This critical value was   discussed with MOON Nguyen in the emergency room at 5:20 PM on 3/16/2025.   Clinical correlation is necessary.    Atelectasis of the left lower lobe and mild left pleural fluid.    Large ventral hernia containing small and large bowel without obstruction.      < end of copied text >  < from: CT Chest No Cont (03.18.25 @ 17:05) >  IMPRESSION:  Emphysema is present. Resolution prior left pneumothorax.   Ill defined   left lung opacity may be related to atelectasis or reexpansion edema.   Subtle opacity posterior right upper lobe. Small bilateral pleural   effusions. There is high density of the superior part of the right major   fissure that may be post procedural    < end of copied text >

## 2025-03-19 NOTE — PROGRESS NOTE ADULT - SUBJECTIVE AND OBJECTIVE BOX
Subjective:  Pt in bed NAD no issues overnight     T(C): 36.7 (25 @ 09:15), Max: 36.8 (25 @ 00:25)  HR: 66 (25 @ 09:15) (66 - 86)  BP: 100/53 (25 @ 09:15) (100/53 - 152/58)  ABP: --  ABP(mean): --  RR: 18 (25 @ 09:15) (17 - 18)  SpO2: 94% (25 @ 09:15) (94% - 95%) 2 L NC   Wt(kg): --  CVP(mm Hg): --  CO: --  CI: --  PA: --                                              Tele: SR                                      CAPILLARY BLOOD GLUCOSE       < from: CT Chest No Cont (25 @ 17:05) >  IMPRESSION:  Emphysema is present. Resolution prior left pneumothorax.   Ill defined   left lung opacity may be related to atelectasis or reexpansion edema.   Subtle opacity posterior right upper lobe. Small bilateral pleural   effusions. There is high density of the superior part of the right major   fissure that may be post procedural. Continued follow-up CT recommended.    < end of copied text >                  Exam   Neuro:  Alert Awake NAD   Pulm: decreased at bases b/l   CV: RRR  Abd: soft   Extremities: warm          Assessment:  74yFemale    with PAST MEDICAL & SURGICAL HISTORY:  No pertinent past medical history      Emphysema/COPD      H/O  section

## 2025-03-19 NOTE — PROGRESS NOTE ADULT - SUBJECTIVE AND OBJECTIVE BOX
3/18: no new complaints  pig tail removed      PHYSICAL EXAM:    Vital Signs Last 24 Hrs  T(C): 36.6 (19 Mar 2025 11:57), Max: 36.8 (19 Mar 2025 00:25)  T(F): 97.9 (19 Mar 2025 11:57), Max: 98.2 (19 Mar 2025 00:25)  HR: 75 (19 Mar 2025 11:57) (66 - 86)  BP: 145/74 (19 Mar 2025 11:57) (100/53 - 149/75)  BP(mean): 65 (19 Mar 2025 09:15) (65 - 89)  RR: 18 (19 Mar 2025 11:57) (17 - 18)  SpO2: 93% (19 Mar 2025 11:57) (93% - 95%)    Parameters below as of 19 Mar 2025 11:57  Patient On (Oxygen Delivery Method): room air        Constitutional: Weak and ill appearing  HEENT: Atraumatic, THADDEUS,   Respiratory: Breath Sounds normal, no rhonchi/wheeze  Cardiovascular: N S1S2;   Gastrointestinal: Abdomen soft, non tender, Bowel Sounds present  Extremities: No edema, peripheral pulses present  Neurological: AAO x 3, no gross focal motor deficits  Skin: Non cellulitic, no rash, ulcers  Lymph Nodes: No lymphadenopathy noted  Back: No CVA tenderness   Musculoskeletal: non tender  Breasts: Deferred  Genitourinary: deferred  Rectal: Deferred    All Labs/EKG/Radiology/Meds reviewed by me                          12.4   9.42  )-----------( 299      ( 17 Mar 2025 07:14 )             37.6       CBC Full  -  ( 17 Mar 2025 07:14 )  WBC Count : 9.42 K/uL  RBC Count : 4.17 M/uL  Hemoglobin : 12.4 g/dL  Hematocrit : 37.6 %  Platelet Count - Automated : 299 K/uL  Mean Cell Volume : 90.2 fl  Mean Cell Hemoglobin : 29.7 pg  Mean Cell Hemoglobin Concentration : 33.0 g/dL  Auto Neutrophil # : x  Auto Lymphocyte # : x  Auto Monocyte # : x  Auto Eosinophil # : x  Auto Basophil # : x  Auto Neutrophil % : x  Auto Lymphocyte % : x  Auto Monocyte % : x  Auto Eosinophil % : x  Auto Basophil % : x      03-17    138  |  108  |  17  ----------------------------<  113[H]  3.6   |  24  |  1.02    Ca    8.8      17 Mar 2025 07:14  Phos  5.0     03-17  Mg     2.0     03-17                      Urinalysis Basic - ( 17 Mar 2025 07:14 )    Color: x / Appearance: x / SG: x / pH: x  Gluc: 113 mg/dL / Ketone: x  / Bili: x / Urobili: x   Blood: x / Protein: x / Nitrite: x   Leuk Esterase: x / RBC: x / WBC x   Sq Epi: x / Non Sq Epi: x / Bacteria: x      < from: CT Chest No Cont (03.18.25 @ 17:05) >  IMPRESSION:  Emphysema is present. Resolution prior left pneumothorax.   Ill defined   left lung opacity may be related to atelectasis or reexpansion edema.   Subtle opacity posterior right upper lobe. Small bilateral pleural   effusions. There is high density of the superior part of the right major   fissure that may be post procedural. Continued follow-up CT recommended.    < end of copied text >      MEDICATIONS  (STANDING):  enoxaparin Injectable 30 milliGRAM(s) SubCutaneous every 24 hours  predniSONE   Tablet 20 milliGRAM(s) Oral daily  senna 2 Tablet(s) Oral at bedtime  tiotropium 2.5 MICROgram(s)/olodaterol 2.5 MICROgram(s) (STIOLTO) Inhaler 2 Puff(s) Inhalation daily    MEDICATIONS  (PRN):  acetaminophen     Tablet .. 650 milliGRAM(s) Oral every 6 hours PRN Mild Pain (1 - 3)  albuterol/ipratropium for Nebulization 3 milliLiter(s) Nebulizer every 6 hours PRN Shortness of Breath and/or Wheezing  diazepam    Tablet 5 milliGRAM(s) Oral every 8 hours PRN muscle spasm  oxyCODONE    IR 5 milliGRAM(s) Oral every 4 hours PRN Moderate Pain (4 - 6)  oxyCODONE    IR 10 milliGRAM(s) Oral every 4 hours PRN Severe Pain (7 - 10)

## 2025-03-20 ENCOUNTER — TRANSCRIPTION ENCOUNTER (OUTPATIENT)
Age: 75
End: 2025-03-20

## 2025-03-20 VITALS
SYSTOLIC BLOOD PRESSURE: 152 MMHG | OXYGEN SATURATION: 94 % | DIASTOLIC BLOOD PRESSURE: 68 MMHG | RESPIRATION RATE: 17 BRPM | TEMPERATURE: 98 F | HEART RATE: 69 BPM

## 2025-03-20 PROCEDURE — 99239 HOSP IP/OBS DSCHRG MGMT >30: CPT

## 2025-03-20 RX ORDER — IPRATROPIUM BROMIDE AND ALBUTEROL SULFATE .5; 2.5 MG/3ML; MG/3ML
3 SOLUTION RESPIRATORY (INHALATION)
Qty: 360 | Refills: 0
Start: 2025-03-20 | End: 2025-04-18

## 2025-03-20 RX ORDER — TIOTROPIUM BROMIDE AND OLODATEROL 3.124; 2.736 UG/1; UG/1
2 SPRAY, METERED RESPIRATORY (INHALATION)
Qty: 1 | Refills: 0
Start: 2025-03-20 | End: 2025-04-18

## 2025-03-20 RX ORDER — PREDNISONE 20 MG/1
1 TABLET ORAL
Qty: 1 | Refills: 0
Start: 2025-03-20 | End: 2025-03-20

## 2025-03-20 RX ADMIN — TIOTROPIUM BROMIDE AND OLODATEROL 2 PUFF(S): 3.124; 2.736 SPRAY, METERED RESPIRATORY (INHALATION) at 10:56

## 2025-03-20 RX ADMIN — OXYCODONE HYDROCHLORIDE 10 MILLIGRAM(S): 30 TABLET ORAL at 02:07

## 2025-03-20 RX ADMIN — PREDNISONE 20 MILLIGRAM(S): 20 TABLET ORAL at 10:10

## 2025-03-20 RX ADMIN — OXYCODONE HYDROCHLORIDE 10 MILLIGRAM(S): 30 TABLET ORAL at 03:07

## 2025-03-20 RX ADMIN — ENOXAPARIN SODIUM 30 MILLIGRAM(S): 100 INJECTION SUBCUTANEOUS at 10:00

## 2025-03-20 NOTE — DISCHARGE NOTE PROVIDER - CARE PROVIDER_API CALL
Mirlande Torres kristyn  Pulmonary Disease  [address] [place] [address]  Phone: [phone]  Fax: [phone]  Follow Up Time: 1-3 days

## 2025-03-20 NOTE — DISCHARGE NOTE PROVIDER - HOSPITAL COURSE
PHYSICAL EXAM:    Daily     Daily     Vital Signs Last 24 Hrs  T(C): 36.4 (20 Mar 2025 09:00), Max: 36.7 (19 Mar 2025 16:11)  T(F): 97.5 (20 Mar 2025 09:00), Max: 98.1 (19 Mar 2025 16:11)  HR: 57 (20 Mar 2025 09:00) (55 - 81)  BP: 126/50 (20 Mar 2025 09:00) (126/50 - 163/84)  BP(mean): --  RR: 17 (20 Mar 2025 09:00) (17 - 18)  SpO2: 90% (20 Mar 2025 09:00) (90% - 95%)    Constitutional: Weak  appearing  HEENT: Atraumatic, THADDEUS, Normal, No congestion  Respiratory: Breath Sounds normal, no rhonchi/wheeze  Cardiovascular: N S1S2;   Gastrointestinal: Abdomen soft, non tender, Bowel Sounds present  Extremities: No edema, peripheral pulses present  Neurological: AAO x 3, no gross focal motor deficits  Skin: Non cellulitic, no rash, ulcers  Lymph Nodes: No lymphadenopathy noted  Back: No CVA tenderness   Musculoskeletal: non tender  Breasts: Deferred  Genitourinary: deferred  Rectal: Deferred      74F, has not seen a doctor in 14 years, current smoker/vape, p/w SOB worsening over 2 wks, foun dto have large L PTX, now s/p L sided pigtail.    #Large Left PTX- possibly in setting of COPD w blebs  -s/p L sided pigtail, care per thoracic  s/p removal 3/18  CT chest noted  pulmo f/u    #likely undx'd COPD  -current smoker  -will need inhaler regimen (pulm also consulted, discussed w Dr. Torres, will defer to pulm)  -will need outpt pulm for formal PFTs  CT chest   Home O2 eval done  needs 3 L/min nc  5 days po prednisone    #Elevated BP on admission  -likely in setting of distress 2/2 PTX  -now normotensive    #Large ventral hernia  -no current or prior sx per pt  -outpt f/u    #Routine screening and care  -will check A1c, TSH, lipid panel  -referral to PCP on d/c    d/c plan home 3/20 on home O2 3 L/min nc    d/c home    time spent 45 min

## 2025-03-20 NOTE — DISCHARGE NOTE PROVIDER - NSDCMRMEDTOKEN_GEN_ALL_CORE_FT
ipratropium-albuterol 0.5 mg-2.5 mg/3 mL inhalation solution: 3 milliliter(s) inhaled every 6 hours as needed for Shortness of Breath and/or Wheezing  predniSONE 20 mg oral tablet: 1 tab(s) orally once a day  tiotropium-olodaterol 2.5 mcg-2.5 mcg/inh inhalation aerosol: 2 puff(s) inhaled once a day

## 2025-03-20 NOTE — DISCHARGE NOTE NURSING/CASE MANAGEMENT/SOCIAL WORK - NSDCPEFALRISK_GEN_ALL_CORE
For information on Fall & Injury Prevention, visit: [email or web address] OR  [email or web address] OR  https://www.cdc.gov/steadi/patient.html

## 2025-03-20 NOTE — DISCHARGE NOTE NURSING/CASE MANAGEMENT/SOCIAL WORK - FINANCIAL ASSISTANCE
[facility] provides services at a reduced cost to those who are determined to be eligible through [facility]’s financial assistance program. Information regarding [facility]’s financial assistance program can be found by going to [email or web address] or by calling [phone].

## 2025-03-20 NOTE — DISCHARGE NOTE PROVIDER - NSDCCPCAREPLAN_GEN_ALL_CORE_FT
PRINCIPAL DISCHARGE DIAGNOSIS  Diagnosis: Pneumothorax  Assessment and Plan of Treatment: resolved      SECONDARY DISCHARGE DIAGNOSES  Diagnosis: COPD exacerbation  Assessment and Plan of Treatment: edgar izaguirre  f/u with your PCp/Pulmonary  on home o2 3 l /min nc

## 2025-03-20 NOTE — DISCHARGE NOTE NURSING/CASE MANAGEMENT/SOCIAL WORK - PATIENT PORTAL LINK FT
You can access the FollowMyHealth Patient Portal offered by [facility] by registering at the following website: [email or web address]. By joining [employer]’s FollowMyHealth portal, you will also be able to view your health information using other applications (apps) compatible with our system.

## 2025-03-20 NOTE — PHARMACOTHERAPY INTERVENTION NOTE - COMMENTS
Medication history complete. Medications and allergies reviewed with patient and confirmed with . Patient states she is not currently taking prescription medication. 
Patient is being discharged on Stiolto, $0 copay confirmed with outpatient [facility]. 
Patient started on Stiolto for COPD and ordered standing Duonebs Q6H, recommended switching Duonebs to PRN for SOB/wheezing.

## 2025-03-21 PROBLEM — J43.9 EMPHYSEMA, UNSPECIFIED: Chronic | Status: ACTIVE | Noted: 2025-03-16

## 2025-03-25 ENCOUNTER — APPOINTMENT (OUTPATIENT)
Dept: PULMONOLOGY | Facility: CLINIC | Age: 75
End: 2025-03-25
Payer: MEDICARE

## 2025-03-25 VITALS
DIASTOLIC BLOOD PRESSURE: 60 MMHG | HEART RATE: 106 BPM | TEMPERATURE: 97.9 F | BODY MASS INDEX: 23.83 KG/M2 | OXYGEN SATURATION: 92 % | SYSTOLIC BLOOD PRESSURE: 100 MMHG | HEIGHT: 60 IN | WEIGHT: 121.38 LBS

## 2025-03-25 DIAGNOSIS — J93.9 PNEUMOTHORAX, UNSPECIFIED: ICD-10-CM

## 2025-03-25 DIAGNOSIS — R91.1 SOLITARY PULMONARY NODULE: ICD-10-CM

## 2025-03-25 DIAGNOSIS — J42 UNSPECIFIED CHRONIC BRONCHITIS: ICD-10-CM

## 2025-03-25 PROCEDURE — 99214 OFFICE O/P EST MOD 30 MIN: CPT | Mod: 25

## 2025-03-25 PROCEDURE — 99406 BEHAV CHNG SMOKING 3-10 MIN: CPT

## 2025-03-25 RX ORDER — ALBUTEROL SULFATE 90 UG/1
108 (90 BASE) INHALANT RESPIRATORY (INHALATION)
Qty: 2 | Refills: 2 | Status: COMPLETED | COMMUNITY
Start: 2025-03-25 | End: 2025-03-25

## 2025-03-25 RX ORDER — FLUTICASONE FUROATE, UMECLIDINIUM BROMIDE AND VILANTEROL TRIFENATATE 200; 62.5; 25 UG/1; UG/1; UG/1
200-62.5-25 POWDER RESPIRATORY (INHALATION) DAILY
Qty: 2 | Refills: 3 | Status: ACTIVE | COMMUNITY
Start: 2025-03-25 | End: 1900-01-01

## 2025-03-25 RX ORDER — IPRATROPIUM BROMIDE AND ALBUTEROL SULFATE 2.5; .5 MG/3ML; MG/3ML
0.5-2.5 (3) SOLUTION RESPIRATORY (INHALATION)
Qty: 270 | Refills: 0 | Status: ACTIVE | COMMUNITY
Start: 2025-03-25 | End: 1900-01-01

## 2025-03-25 RX ORDER — L-DESOXYEPHEDRINE 50 MG
INHALER (EA) NASAL
Refills: 0 | Status: ACTIVE | COMMUNITY

## 2025-04-16 ENCOUNTER — INPATIENT (INPATIENT)
Facility: HOSPITAL | Age: 75
LOS: 1 days | Discharge: NURSING, REHAB OR LONG-TERM CARE | DRG: 66 | End: 2025-04-18
Attending: INTERNAL MEDICINE | Admitting: STUDENT IN AN ORGANIZED HEALTH CARE EDUCATION/TRAINING PROGRAM
Payer: MEDICARE

## 2025-04-16 VITALS — HEIGHT: 64 IN

## 2025-04-16 DIAGNOSIS — I63.9 CEREBRAL INFARCTION, UNSPECIFIED: ICD-10-CM

## 2025-04-16 DIAGNOSIS — Z98.891 HISTORY OF UTERINE SCAR FROM PREVIOUS SURGERY: Chronic | ICD-10-CM

## 2025-04-16 LAB
ALBUMIN SERPL ELPH-MCNC: 3.3 G/DL — SIGNIFICANT CHANGE UP (ref 3.3–5)
ALP SERPL-CCNC: 111 U/L — SIGNIFICANT CHANGE UP (ref 40–120)
ALT FLD-CCNC: 23 U/L — SIGNIFICANT CHANGE UP (ref 12–78)
ANION GAP SERPL CALC-SCNC: 8 MMOL/L — SIGNIFICANT CHANGE UP (ref 5–17)
APTT BLD: 29.2 SEC — SIGNIFICANT CHANGE UP (ref 24.5–35.6)
AST SERPL-CCNC: 44 U/L — HIGH (ref 15–37)
BASOPHILS # BLD AUTO: 0.06 K/UL — SIGNIFICANT CHANGE UP (ref 0–0.2)
BASOPHILS NFR BLD AUTO: 0.7 % — SIGNIFICANT CHANGE UP (ref 0–2)
BILIRUB SERPL-MCNC: 1.7 MG/DL — HIGH (ref 0.2–1.2)
BUN SERPL-MCNC: 14 MG/DL — SIGNIFICANT CHANGE UP (ref 7–23)
CALCIUM SERPL-MCNC: 9.2 MG/DL — SIGNIFICANT CHANGE UP (ref 8.5–10.1)
CHLORIDE SERPL-SCNC: 106 MMOL/L — SIGNIFICANT CHANGE UP (ref 96–108)
CO2 SERPL-SCNC: 22 MMOL/L — SIGNIFICANT CHANGE UP (ref 22–31)
CREAT SERPL-MCNC: 0.75 MG/DL — SIGNIFICANT CHANGE UP (ref 0.5–1.3)
EGFR: 84 ML/MIN/1.73M2 — SIGNIFICANT CHANGE UP
EGFR: 84 ML/MIN/1.73M2 — SIGNIFICANT CHANGE UP
EOSINOPHIL # BLD AUTO: 0.02 K/UL — SIGNIFICANT CHANGE UP (ref 0–0.5)
EOSINOPHIL NFR BLD AUTO: 0.2 % — SIGNIFICANT CHANGE UP (ref 0–6)
ETHANOL SERPL-MCNC: <10 MG/DL — SIGNIFICANT CHANGE UP (ref 0–10)
GLUCOSE SERPL-MCNC: 96 MG/DL — SIGNIFICANT CHANGE UP (ref 70–99)
HCT VFR BLD CALC: 40 % — SIGNIFICANT CHANGE UP (ref 34.5–45)
HGB BLD-MCNC: 13.6 G/DL — SIGNIFICANT CHANGE UP (ref 11.5–15.5)
IMM GRANULOCYTES # BLD AUTO: 0.02 K/UL — SIGNIFICANT CHANGE UP (ref 0–0.07)
IMM GRANULOCYTES NFR BLD AUTO: 0.2 % — SIGNIFICANT CHANGE UP (ref 0–0.9)
INR BLD: 1.08 RATIO — SIGNIFICANT CHANGE UP (ref 0.85–1.16)
LYMPHOCYTES # BLD AUTO: 0.98 K/UL — LOW (ref 1–3.3)
LYMPHOCYTES NFR BLD AUTO: 11 % — LOW (ref 13–44)
MCHC RBC-ENTMCNC: 29.8 PG — SIGNIFICANT CHANGE UP (ref 27–34)
MCHC RBC-ENTMCNC: 34 G/DL — SIGNIFICANT CHANGE UP (ref 32–36)
MCV RBC AUTO: 87.5 FL — SIGNIFICANT CHANGE UP (ref 80–100)
MONOCYTES # BLD AUTO: 0.91 K/UL — HIGH (ref 0–0.9)
MONOCYTES NFR BLD AUTO: 10.3 % — SIGNIFICANT CHANGE UP (ref 2–14)
NEUTROPHILS # BLD AUTO: 6.88 K/UL — SIGNIFICANT CHANGE UP (ref 1.8–7.4)
NEUTROPHILS NFR BLD AUTO: 77.6 % — HIGH (ref 43–77)
NRBC # BLD AUTO: 0 K/UL — SIGNIFICANT CHANGE UP (ref 0–0)
NRBC # FLD: 0 K/UL — SIGNIFICANT CHANGE UP (ref 0–0)
NRBC BLD AUTO-RTO: 0 /100 WBCS — SIGNIFICANT CHANGE UP (ref 0–0)
PLATELET # BLD AUTO: 336 K/UL — SIGNIFICANT CHANGE UP (ref 150–400)
PMV BLD: 9.8 FL — SIGNIFICANT CHANGE UP (ref 7–13)
POTASSIUM SERPL-MCNC: 3.6 MMOL/L — SIGNIFICANT CHANGE UP (ref 3.5–5.3)
POTASSIUM SERPL-SCNC: 3.6 MMOL/L — SIGNIFICANT CHANGE UP (ref 3.5–5.3)
PROT SERPL-MCNC: 7.1 GM/DL — SIGNIFICANT CHANGE UP (ref 6–8.3)
PROTHROM AB SERPL-ACNC: 12.4 SEC — SIGNIFICANT CHANGE UP (ref 9.9–13.4)
RBC # BLD: 4.57 M/UL — SIGNIFICANT CHANGE UP (ref 3.8–5.2)
RBC # FLD: 13.1 % — SIGNIFICANT CHANGE UP (ref 10.3–14.5)
SODIUM SERPL-SCNC: 136 MMOL/L — SIGNIFICANT CHANGE UP (ref 135–145)
TROPONIN I, HIGH SENSITIVITY RESULT: 14.45 NG/L — SIGNIFICANT CHANGE UP
WBC # BLD: 8.87 K/UL — SIGNIFICANT CHANGE UP (ref 3.8–10.5)
WBC # FLD AUTO: 8.87 K/UL — SIGNIFICANT CHANGE UP (ref 3.8–10.5)

## 2025-04-16 PROCEDURE — 70498 CT ANGIOGRAPHY NECK: CPT | Mod: 26

## 2025-04-16 PROCEDURE — 80074 ACUTE HEPATITIS PANEL: CPT

## 2025-04-16 PROCEDURE — 92610 EVALUATE SWALLOWING FUNCTION: CPT | Mod: GN

## 2025-04-16 PROCEDURE — 80061 LIPID PANEL: CPT

## 2025-04-16 PROCEDURE — 97166 OT EVAL MOD COMPLEX 45 MIN: CPT | Mod: GO

## 2025-04-16 PROCEDURE — 36415 COLL VENOUS BLD VENIPUNCTURE: CPT

## 2025-04-16 PROCEDURE — 70496 CT ANGIOGRAPHY HEAD: CPT | Mod: 26

## 2025-04-16 PROCEDURE — 93010 ELECTROCARDIOGRAM REPORT: CPT

## 2025-04-16 PROCEDURE — 99223 1ST HOSP IP/OBS HIGH 75: CPT

## 2025-04-16 PROCEDURE — 97535 SELF CARE MNGMENT TRAINING: CPT | Mod: GO

## 2025-04-16 PROCEDURE — 80048 BASIC METABOLIC PNL TOTAL CA: CPT

## 2025-04-16 PROCEDURE — 97163 PT EVAL HIGH COMPLEX 45 MIN: CPT | Mod: GP

## 2025-04-16 PROCEDURE — 97116 GAIT TRAINING THERAPY: CPT | Mod: GP

## 2025-04-16 PROCEDURE — 99285 EMERGENCY DEPT VISIT HI MDM: CPT

## 2025-04-16 PROCEDURE — 94640 AIRWAY INHALATION TREATMENT: CPT

## 2025-04-16 PROCEDURE — 97530 THERAPEUTIC ACTIVITIES: CPT | Mod: GO

## 2025-04-16 PROCEDURE — 92523 SPEECH SOUND LANG COMPREHEN: CPT | Mod: GN

## 2025-04-16 PROCEDURE — 70551 MRI BRAIN STEM W/O DYE: CPT | Mod: MC

## 2025-04-16 PROCEDURE — 83036 HEMOGLOBIN GLYCOSYLATED A1C: CPT

## 2025-04-16 PROCEDURE — 93306 TTE W/DOPPLER COMPLETE: CPT

## 2025-04-16 RX ORDER — ATORVASTATIN CALCIUM 80 MG/1
80 TABLET, FILM COATED ORAL AT BEDTIME
Refills: 0 | Status: DISCONTINUED | OUTPATIENT
Start: 2025-04-16 | End: 2025-04-18

## 2025-04-16 RX ORDER — ASPIRIN 325 MG
81 TABLET ORAL DAILY
Refills: 0 | Status: DISCONTINUED | OUTPATIENT
Start: 2025-04-16 | End: 2025-04-18

## 2025-04-16 RX ORDER — TIOTROPIUM BROMIDE INHALATION SPRAY 3.12 UG/1
2 SPRAY, METERED RESPIRATORY (INHALATION) DAILY
Refills: 0 | Status: DISCONTINUED | OUTPATIENT
Start: 2025-04-16 | End: 2025-04-17

## 2025-04-16 RX ORDER — FLUTICASONE FUROATE, UMECLIDINIUM BROMIDE AND VILANTEROL TRIFENATATE 100; 62.5; 25 UG/1; UG/1; UG/1
1 POWDER RESPIRATORY (INHALATION)
Refills: 0 | DISCHARGE

## 2025-04-16 RX ORDER — ENOXAPARIN SODIUM 100 MG/ML
40 INJECTION SUBCUTANEOUS EVERY 24 HOURS
Refills: 0 | Status: DISCONTINUED | OUTPATIENT
Start: 2025-04-16 | End: 2025-04-18

## 2025-04-16 RX ORDER — ALBUTEROL SULFATE 2.5 MG/3ML
2 VIAL, NEBULIZER (ML) INHALATION EVERY 6 HOURS
Refills: 0 | Status: DISCONTINUED | OUTPATIENT
Start: 2025-04-16 | End: 2025-04-18

## 2025-04-16 RX ADMIN — ATORVASTATIN CALCIUM 80 MILLIGRAM(S): 80 TABLET, FILM COATED ORAL at 23:57

## 2025-04-16 RX ADMIN — Medication 81 MILLIGRAM(S): at 17:43

## 2025-04-16 NOTE — ED PROVIDER NOTE - NS ED ROS FT
ROS: Constitutional- Neg fever, Neg chills.  Respiratory- Neg cough, Neg SOB  Cardiac- no chest pain, no palpitations, ENT- No rhinorrhea, no sore throat, no congestion.  Abdomen- No nausea, no vomiting, no diarrhea.  Urinary- no dysuria, no urgency, no frequency.  Skin- No rashes Neuro: (+) Severe R arm weakness.  Unable to lift R arm.

## 2025-04-16 NOTE — ED PROVIDER NOTE - NIH STROKE SCALE: 2. BEST GAZE, QM
(0) Normal I will START or STAY ON the medications listed below when I get home from the hospital:    acetaminophen 325 mg oral tablet  -- 3 tab(s) by mouth every 6 hours, As Needed  -- Indication: For PAIN    ibuprofen 600 mg oral tablet  -- 1 tab(s) by mouth every 6 hours, As Needed  -- Indication: For PAIN

## 2025-04-16 NOTE — ED PROVIDER NOTE - OBJECTIVE STATEMENT
74-year-old female with past medical history of COPD, pneumothorax and TIAs,  presents to the ED with sister complaining of severe right arm weakness.  Per patient she woke up on Monday, 2 days ago with difficulty moving and lifting right arm.  Patient thought she had a stroke but did not call 911 for evaluation.  Patient reports she was able to get around at home with her son's assistance.  Patient does reports she fell 1 time without head injury loss of consciousness.  Patient reports her last well was Sunday night prior to going to bed.  Patient is not having headache nausea vomiting visual changes.  Pt's sister reports pt called her today and told her she thought she had a stroke, so she brought her to the ED.  Pt was in bed when her sister arrived today.

## 2025-04-16 NOTE — ED STATDOCS - CLINICAL SUMMARY MEDICAL DECISION MAKING FREE TEXT BOX
75 yo history of COPD, many strokes according to patient, who presents with chief complaint of right-sided weakness.  Patient states that Monday morning she had sudden onset weakness of her right arm and right leg.  She denies any other symptoms.  I was called to evaluate patient in the waiting room.  She has profound weakness of the right arm, mild drift of the right lower extremity.  Normal sensation.  Patient is out of the window for code stroke as his symptoms greater than 24 hours.  However given profound weakness, this is likely a stroke, recommending that patient be sent to the main ED for further evaluation management. Vinay Churchill D.O.

## 2025-04-16 NOTE — H&P ADULT - NSHPPHYSICALEXAM_GEN_ALL_CORE
PHYSICAL EXAM:  GENERAL: NAD, lying in bed comfortably  HEAD:  Atraumatic, Normocephalic  EYES: EOMI, PERRLA, conjunctiva and sclera clear  ENT: Moist mucous membranes  NECK: Supple, No JVD  CHEST/LUNG: Clear to auscultation bilaterally; No rales, rhonchi, wheezing, or rubs. Unlabored respirations  HEART: Regular rate and rhythm; No murmurs, rubs, or gallops  ABDOMEN: Bowel sounds present; Soft, Nontender, Nondistended. No hepatomegaly  EXTREMITIES:  2+ Peripheral Pulses, brisk capillary refill. No clubbing, cyanosis, or edema  NERVOUS SYSTEM:  Alert & Oriented X3, speech clear. Muscle strength in RUE 1/5, and in other 3 extremities 5/5. Mild right facial droop.  MSK: FROM all 4 extremities, full and equal strength  SKIN: No rashes or lesions
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
## 2025-04-16 NOTE — ED ADULT TRIAGE NOTE - CHIEF COMPLAINT QUOTE
Patient presents to the ER with complaints of right sided weakness for two days. Patient unable to lift right arm in triage, weak hand , weakness noted to right leg. Patient has history of two "mini strokes." Speech and face normal per patient and sister, sensation intact, denies N/V, headache or dizziness. No Code Stroke per MD Churchill.

## 2025-04-16 NOTE — ED PROVIDER NOTE - PHYSICAL EXAMINATION
Constitutional: NAD AAOx3  Eyes: EOMI, pupils equal  Head: Normocephalic atraumatic  Mouth: no airway obstruction  Neck: NT cervical spine to palp.  Full AROM.    Cardiac: regular rate   Resp: Lungs CTA Bilat  GI: Abd s/nt/nd  Neuro: (+) Mild Right side of face asymmetry.  CN2-12 intact (+) Right arm no movement, Unable to lift R arm or hold up.  R arm falls back to bed.  (+) Sens to UE bilat.  LE with full AROM.  Able to hold up for 5 seconds w drift.  (+) Heel to shin.  Neg slurred speech.    NIH Stroke scale of 5    Skin: No rashes Constitutional: NAD AAOx3  Eyes: EOMI, pupils equal  Head: Normocephalic atraumatic  Mouth: no airway obstruction  Neck: NT cervical spine to palp.  Full AROM.    Cardiac: regular rate   Resp: Lungs CTA Bilat  GI: Abd (+) Large, soft hernia to upper abdomen.  Soft, NT.  Neg rebound or guarding.    Neuro: (+) Mild Right side of face asymmetry.  CN2-12 intact (+) Right arm no movement, Unable to lift R arm or hold up.  R arm falls back to bed.  (+) Sens to UE bilat.  LE with full AROM.  Able to hold up for 5 seconds w drift.  (+) Heel to shin.  Neg slurred speech.    NIH Stroke scale of 5    Skin: No rashes

## 2025-04-16 NOTE — ED PROVIDER NOTE - CLINICAL SUMMARY MEDICAL DECISION MAKING FREE TEXT BOX
73 yo F seen and examined with PA presents w/ profound right arm and leg weakness since waking up Monday morning 4/14/25, LKW Sunday at bedtime 4/13/25 ??PM.  Not a tnk candidate. Hx of 2 prior "mini strokes." Not on OACs.  No ha or n/v. Plan for ct angio head and neck, labs, and tba, neuro consult. ASA after ct confirms no bleeding. 75 yo F w/pmhx of COPD and CVA, seen and examined with PA presents w/ profound right arm and leg weakness since waking up Monday morning 4/14/25, LKW Sunday at bedtime 4/13/25 ??PM.  Not a tnk candidate. Hx of 2 prior "mini strokes." Not on OACs.  No ha or n/v. Plan for ct angio head and neck, labs, and tba, neuro consult. ASA after ct confirms no bleeding.

## 2025-04-16 NOTE — ED ADULT TRIAGE NOTE - MODE OF ARRIVAL
[employer] [facility] Emergency Department Referral Coordinators will be reaching out to you in the next 24-48 hours from 9:00am to 5:00pm to schedule a follow up appointment. Please expect a phone call from the hospital in that time frame. If you do not receive a call or if you have any questions or concerns, you can reach them at   [phone]. [employer] [facility] Emergency Department Referral Coordinators will be reaching out to you in the next 24-48 hours from 9:00am to 5:00pm to schedule a follow up appointment. Please expect a phone call from the hospital in that time frame. If you do not receive a call or if you have any questions or concerns, you can reach them at   [phone].    Hand Pain  Many things can cause hand pain. Some common causes are:  An injury.  Repeating the same movement with your hand over and over (overuse).  Osteoporosis.  Arthritis.  Lumps in the tendons or joints of the hand and wrist (ganglion cysts).  Nerve compression syndromes (carpal tunnel syndrome).  Inflammation of the tendons (tendinitis).  Infection.  Follow these instructions at home:  Pay attention to any changes in your symptoms. Take these actions to help with your discomfort:    Managing pain, stiffness, and swelling    A bag of ice on a towel on the skin.  Take over-the-counter and prescription medicines only as told by your health care provider.  Wear a hand splint or support as told by your health care provider.  If directed, put ice on the affected area:  Put ice in a plastic bag.  Place a towel between your skin and the bag.  Leave the ice on for 20 minutes, 2–3 times a day.  Activity    Take breaks from repetitive activity often.  Avoid activities that make your pain worse.  Minimize stress on your hands and wrists as much as possible.  Do stretches or exercises as told by your health care provider.  Do not do activities that make your pain worse.  Contact a health care provider if:  Your pain does not get better after a few days of self-care.  Your pain gets worse.  Your pain affects your ability to do your daily activities.  Get help right away if:  Your hand becomes warm, red, or swollen.  Your hand is numb or tingling.  Your hand is extremely swollen or deformed.  Your hand or fingers turn white or blue.  You cannot move your hand, wrist, or fingers.  Summary  Many things can cause hand pain.  Contact your health care provider if your pain does not get better after a few days of self care.  Minimize stress on your hands and wrists as much as possible.  Do not do activities that make your pain worse.

## 2025-04-16 NOTE — ED ADULT TRIAGE NOTE - GLASGOW COMA SCALE: EYE OPENING, MLM
Phone call placed to rajani Borja to approve pt transport to [facility]. Pt transported in stable condition.   (E4) spontaneous

## 2025-04-16 NOTE — ED ADULT NURSE NOTE - OBJECTIVE STATEMENT
75 y/o female presents to ED c/o R sided weakness starting x2 days ago. Pt unable to lift R arm and drift noted in R leg. Pt reports having increase in difficulty walking. Sister at bedside states tat pt's speech and face is at baseline. Pt denies any pain, n/v, dizziness. PMx COPD. 75 y/o female presents to ED c/o R sided weakness starting x2 days ago. Pt unable to lift R arm and drift noted in R leg. Pt reports having increase in difficulty walking. Sister at bedside states tat pt's speech and face is at baseline. Pt denies any pain, n/v, dizziness. PMx COPD, TIAs x2

## 2025-04-16 NOTE — H&P ADULT - NSHPLABSRESULTS_GEN_ALL_CORE
LABS:                        13.6   8.87  )-----------( 336      ( 16 Apr 2025 16:22 )             40.0     04-16    136  |  106  |  14  ----------------------------<  96  3.6   |  22  |  0.75    Ca    9.2      16 Apr 2025 16:22    TPro  7.1  /  Alb  3.3  /  TBili  1.7[H]  /  DBili  x   /  AST  44[H]  /  ALT  23  /  AlkPhos  111  04-16    PT/INR - ( 16 Apr 2025 16:22 )   PT: 12.4 sec;   INR: 1.08 ratio         PTT - ( 16 Apr 2025 16:22 )  PTT:29.2 sec        < from: CT Angio Head w/ IV Cont (04.16.25 @ 17:18) >    IMPRESSION:    CT HEAD:  High right parietal encephalomalacia and gliosis, likely related to prior   infarct in this location. Age-indeterminate ischemic event on the left at   the level of the corona radiata. Old left basal ganglia lacunar infarct.   Age-indeterminate left thalamic lacunar infarct. No acute intracranial   hemorrhage.    Findings were discussed with MOON BAKER [id number] 4/16/2025 5:24   PM by Dr. Behr Ventura with read back confirmation.      CTA NECK:  1. Deposition of calcified plaque along the proximal aspect of the   bilateral internal carotid arteries without significant stenosis or   occlusion.  2. Bilateral vertebral arteries are patent. Left vertebral artery is   dominant.    CTA HEAD:  1. Nolarge vessel occlusion.  2. Deposition of calcified plaque with mild stenosis involving the   cavernous and supraclinoid bilateral internal carotid arteries..  3. No aneurysm identified. Tiny aneurysms can be beyond the resolution of   CTA technique.    < end of copied text >

## 2025-04-16 NOTE — H&P ADULT - HISTORY OF PRESENT ILLNESS
Chief Complaint: weakness.    The patient is a 74y Female with significant PMH of COPD, pneumothorax, collapsed lung, and TIAs,  presented to the ED with sister complaining of severe right arm weakness.  Per patient she woke up on Monday ( 2 days ago) with difficulty moving and lifting right arm.  Patient thought she had a stroke but did not call 911 for evaluation.  Patient reports she was able to get around at home with her son's assistance.  Patient does reports she fell 1 time without head injury or loss of consciousness.  Patient reports her last well was  night prior to going to bed.  Pt's sister reports pt called her today and told her she thought she had a stroke, so she brought her to the ED.  Pt was in bed when her sister arrived today. Patient is not having headache nausea vomiting visual changes. Patient says that "her right hand is dead for last 2 days". She has been using her left hand for the needful. It seems that she also has some right facial symmetry, but patient does not know whether its new or old.  She denies any weakness to her either lower extremities. Her NIH SS was 5 in ED, CODE stroke was not called in ED, not a candidate for tPA or thrombectomy. Otherwise, she denies fever, chills, headache, slurred speech, difficulty swallowing, chest pain, sob, N/V, abd pain, diarrhea or dysuria. She si not on any AC or antiplatelets.  Denies smoking, alcohol or substance abuse.    Sig labs: CBC and CPM wnl except TBil 1.7 and AST 44.  Troponin 14.45 and EtOH <10.  EK bpm in NSR.    CT head: Impression:  High right parietal encephalomalacia and gliosis, likely related to prior   infarct in this location. Age-indeterminate ischemic event on the left at   the level of the corona radiata. Old left basal ganglia lacunar infarct.   Age-indeterminate left thalamic lacunar infarct. No acute intracranial   hemorrhage.    CTA head and neck: Unremarkable for any acute significant stenosis, occlusion or aneurysm.    ASA 81 mg po given in ED and neurology consult has been placed.

## 2025-04-16 NOTE — ED ADULT NURSE NOTE - NSFALLRISKINTERV_ED_ALL_ED

## 2025-04-16 NOTE — ED PROVIDER NOTE - CARE PLAN
Principal Discharge DX:	Cerebrovascular accident (CVA)  Secondary Diagnosis:	Right arm weakness  Secondary Diagnosis:	Abdominal hernia   1

## 2025-04-16 NOTE — PATIENT PROFILE ADULT - FALL HARM RISK - RISK INTERVENTIONS
Assistance OOB with selected safe patient handling equipment/Assistance with ambulation/Communicate Fall Risk and Risk Factors to all staff, patient, and family/Discuss with provider need for PT consult/Monitor gait and stability/Reinforce activity limits and safety measures with patient and family/Visual Cue: Yellow wristband/Bed in lowest position, wheels locked, appropriate side rails in place/Call bell, personal items and telephone in reach/Instruct patient to call for assistance before getting out of bed or chair/Non-slip footwear when patient is out of bed/[place] to call system/Physically safe environment - no spills, clutter or unnecessary equipment/Purposeful Proactive Rounding/Room/bathroom lighting operational, light cord in reach

## 2025-04-17 ENCOUNTER — RESULT REVIEW (OUTPATIENT)
Age: 75
End: 2025-04-17

## 2025-04-17 PROBLEM — Z78.9 OTHER SPECIFIED HEALTH STATUS: Chronic | Status: INACTIVE | Noted: 2023-01-28 | Resolved: 2025-04-16

## 2025-04-17 LAB
A1C WITH ESTIMATED AVERAGE GLUCOSE RESULT: 5.9 % — HIGH (ref 4–5.6)
ANION GAP SERPL CALC-SCNC: 8 MMOL/L — SIGNIFICANT CHANGE UP (ref 5–17)
BUN SERPL-MCNC: 18 MG/DL — SIGNIFICANT CHANGE UP (ref 7–23)
CALCIUM SERPL-MCNC: 8.9 MG/DL — SIGNIFICANT CHANGE UP (ref 8.5–10.1)
CHLORIDE SERPL-SCNC: 107 MMOL/L — SIGNIFICANT CHANGE UP (ref 96–108)
CHOLEST SERPL-MCNC: 221 MG/DL — HIGH
CO2 SERPL-SCNC: 21 MMOL/L — LOW (ref 22–31)
CREAT SERPL-MCNC: 0.65 MG/DL — SIGNIFICANT CHANGE UP (ref 0.5–1.3)
EGFR: 93 ML/MIN/1.73M2 — SIGNIFICANT CHANGE UP
EGFR: 93 ML/MIN/1.73M2 — SIGNIFICANT CHANGE UP
ESTIMATED AVERAGE GLUCOSE: 123 MG/DL — HIGH (ref 68–114)
GLUCOSE SERPL-MCNC: 104 MG/DL — HIGH (ref 70–99)
HAV IGM SER-ACNC: SIGNIFICANT CHANGE UP
HBV CORE IGM SER-ACNC: SIGNIFICANT CHANGE UP
HBV SURFACE AG SER-ACNC: SIGNIFICANT CHANGE UP
HCV AB S/CO SERPL IA: 0.11 S/CO — SIGNIFICANT CHANGE UP (ref 0–0.79)
HCV AB SERPL-IMP: SIGNIFICANT CHANGE UP
HDLC SERPL-MCNC: 43 MG/DL — LOW
LDLC SERPL-MCNC: 156 MG/DL — HIGH
LIPID PNL WITH DIRECT LDL SERPL: 156 MG/DL — HIGH
NONHDLC SERPL-MCNC: 178 MG/DL — HIGH
POTASSIUM SERPL-MCNC: 3.2 MMOL/L — LOW (ref 3.5–5.3)
POTASSIUM SERPL-SCNC: 3.2 MMOL/L — LOW (ref 3.5–5.3)
SODIUM SERPL-SCNC: 136 MMOL/L — SIGNIFICANT CHANGE UP (ref 135–145)
TRIGL SERPL-MCNC: 118 MG/DL — SIGNIFICANT CHANGE UP

## 2025-04-17 PROCEDURE — 70551 MRI BRAIN STEM W/O DYE: CPT | Mod: 26

## 2025-04-17 PROCEDURE — 99233 SBSQ HOSP IP/OBS HIGH 50: CPT

## 2025-04-17 PROCEDURE — 99223 1ST HOSP IP/OBS HIGH 75: CPT

## 2025-04-17 PROCEDURE — 93306 TTE W/DOPPLER COMPLETE: CPT | Mod: 26

## 2025-04-17 RX ORDER — FLUTICASONE FUROATE, UMECLIDINIUM BROMIDE AND VILANTEROL TRIFENATATE 100; 62.5; 25 UG/1; UG/1; UG/1
1 POWDER RESPIRATORY (INHALATION) DAILY
Refills: 0 | Status: DISCONTINUED | OUTPATIENT
Start: 2025-04-17 | End: 2025-04-18

## 2025-04-17 RX ORDER — CLOPIDOGREL BISULFATE 75 MG/1
75 TABLET, FILM COATED ORAL DAILY
Refills: 0 | Status: DISCONTINUED | OUTPATIENT
Start: 2025-04-17 | End: 2025-04-18

## 2025-04-17 RX ORDER — ACETAMINOPHEN 500 MG/5ML
650 LIQUID (ML) ORAL EVERY 6 HOURS
Refills: 0 | Status: DISCONTINUED | OUTPATIENT
Start: 2025-04-17 | End: 2025-04-18

## 2025-04-17 RX ADMIN — Medication 40 MILLIEQUIVALENT(S): at 17:23

## 2025-04-17 RX ADMIN — Medication 650 MILLIGRAM(S): at 17:29

## 2025-04-17 RX ADMIN — ENOXAPARIN SODIUM 40 MILLIGRAM(S): 100 INJECTION SUBCUTANEOUS at 06:20

## 2025-04-17 RX ADMIN — CLOPIDOGREL BISULFATE 75 MILLIGRAM(S): 75 TABLET, FILM COATED ORAL at 14:31

## 2025-04-17 RX ADMIN — ATORVASTATIN CALCIUM 80 MILLIGRAM(S): 80 TABLET, FILM COATED ORAL at 21:14

## 2025-04-17 RX ADMIN — Medication 81 MILLIGRAM(S): at 10:36

## 2025-04-17 RX ADMIN — Medication 40 MILLIEQUIVALENT(S): at 14:32

## 2025-04-17 NOTE — PHYSICAL THERAPY INITIAL EVALUATION ADULT - PERTINENT HX OF CURRENT PROBLEM, REHAB EVAL
The patient is a 74y Female with significant PMH of COPD, pneumothorax, collapsed lung, and TIAs,  presented to the ED with sister complaining of severe right arm weakness.  Per patient she woke up on Monday ( 2 days ago) with difficulty moving and lifting right arm.  Patient thought she had a stroke but did not call 911 for evaluation.  Patient reports she was able to get around at home with her son's assistance.  Patient does reports she fell 1 time without head injury or loss of consciousness. NIH SS was 5 in ED. RUE weakness with right facial asymmetry likely CVA with monoplegia.  -CT head shows age-indeterminate ischemic event on the left at the level of the corona radiata, and high right parietal encephalomalacia and gliosis with old left basal ganglia lacunar infarct likely related to prior infarct. The patient is a 74y Female with significant PMH of COPD, pneumothorax, collapsed lung, and TIAs,  presented to the ED with sister complaining of severe right arm weakness.  Per patient she woke up on Monday ( 2 days ago) with difficulty moving and lifting right arm.  Patient thought she had a stroke but did not call 911 for evaluation.  Patient reports she was able to get around at home with her son's assistance.  Patient does reports she fell 1 time without head injury or loss of consciousness. NIH SS was 5 in ED. RUE weakness with right facial asymmetry likely CVA with monoplegia.  -CT head shows age-indeterminate ischemic event on the left at the level of the corona radiata, and high right parietal encephalomalacia and gliosis with old left basal ganglia lacunar infarct likely related to prior infarct.  - MRI brain+ Acute L infarct corona radiata.

## 2025-04-17 NOTE — OCCUPATIONAL THERAPY INITIAL EVALUATION ADULT - ADDITIONAL COMMENTS
Pt report she lives in an apt w/o steps, tub/shower combo +curtain, standard toilet. Pt (I) with ADL/IADL tasks PTA, walked without AD.  His son has a TBI for 20 years. RHD.

## 2025-04-17 NOTE — OCCUPATIONAL THERAPY INITIAL EVALUATION ADULT - GENERAL OBSERVATIONS, REHAB EVAL
Pt rec'd semi supine in bed, bed alarmed, sleeping with notable food pocked in mouth, +PIV, +tele agreeable to OT IE.

## 2025-04-17 NOTE — SWALLOW BEDSIDE ASSESSMENT ADULT - SWALLOW EVAL: CRITERIA FOR SKILLED INTERVENTION MET
DO NOT FEEL THAT ACUTE SPEECH PATHOLOGY FOLLOW UP WOULD CHANGE CLINICAL MANAGEMENT/OUTCOME IN HOSPITAL SETTING. PT IS ABLE TO VERBALIZE HER INTENTS AND FEELS THAT SHE IS AT SPEECH-LANGUAGE BASELINE. FURTHER, PT'S OROPHARYNGEAL SWALLOW INTEGRITY IS FELT TO BE FUNCTIONAL/AT USUAL STATE. ACUTE ST WOULD NOT CHANGE CLINICAL OUTCOME. GIVEN ABOVE, THIS SERVICE WILL NOT ACTIVELY FOLLOW. RECONSULT PRN SHOULD STATUS CHANGE AND CONDITION WARRANT.

## 2025-04-17 NOTE — CONSULT NOTE ADULT - ASSESSMENT
74y Female with significant PMH of COPD, TIAs not on ASA at home,  presented to the ED 4/16 complaining of severe right arm weakness.  Per patient she woke up on Monday ( 2 days ago) with difficulty moving and lifting right arm.  Patient thought she had a stroke but did not call 911 for evaluation.  Patient reports she was able to get around at home with her son's assistance.  Patient does reports she fell 1 time without head injury or loss of consciousness.  LKW Sunday night.  Denies diplopia, dysarthria.  +R facial weakness-unclear how long.   Her NIHSS was 5 in ED, CODE stroke was not called in ED.  CTH showed high R parietal encephalomalacia and gliosis related likely to prior infarct, age indeterminate ischemic event on the L CR, old BG L lacunar infarct, age indeterminate L thalamic lacunar infarct.  She was not a candidate for IV thrombolytics because she was out of the window.  CTA was neg for LVO, not a candidate for thrombectomy because she was OOTW.   MRI brain this morning + Acute L infarct CR.  PE today NIHSS 5      #acute L CR infarct with dense RUE plegia, RLE weakness, R facial weakness  NIHSS 5    Recommendations  -monitor on tele  -Neurochecks/VS q 4  -PT/OT/SLP eval  -Continue ASA, HD statin  -consider LT cardiac monitoring  -DVT ppx  -F/U with Neurology outpatient  -Call/page Neurology for additional questions    D/W Dr. Carrillo, patient     74y Female with significant PMH of COPD, TIAs not on ASA at home,  presented to the ED 4/16 complaining of severe right arm weakness.  Per patient she woke up on Monday ( 2 days ago) with difficulty moving and lifting right arm.  thought she had a stroke but did not call 911, reports she was able to get around at home with her son's assistance, fell 1 time without head injury or loss of consciousness. Denies diplopia, dysarthria.  +R facial weakness-unclear how long. NIHSS was 5 in ED, CODE stroke was not called in ED.  CTH high R parietal encephalomalacia and gliosis related likely to prior infarct, age indeterminate L CR, old BG L lacunar infarct, L thalamic lacunar infarct.  She was not a candidate for IV thrombolytics because she was out of the window. CTA was neg for LVO, not a candidate for thrombectomy because she was OOTW.   MRI brain this morning reveals acute L infarct CR.  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 NIHSS 5    Recommendations  -monitor on tele  -Neurochecks/VS q 4  -PT/OT/SLP eval  -TTE  -Continue ASA, add Plavix 75 mg x 3 weeks  - HD statin  -consider LT cardiac monitoring  -DVT ppx  -Rehab plan    D/W Dr. Carrillo, patient

## 2025-04-17 NOTE — DIETITIAN INITIAL EVALUATION ADULT - PERTINENT MEDS FT
MEDICATIONS  (STANDING):  albuterol    90 MICROgram(s) HFA Inhaler 2 Puff(s) Inhalation every 6 hours  aspirin  chewable 81 milliGRAM(s) Oral daily  atorvastatin 80 milliGRAM(s) Oral at bedtime  enoxaparin Injectable 40 milliGRAM(s) SubCutaneous every 24 hours  fluticasone propionate/ salmeterol 100-50 MICROgram(s) Diskus 1 Dose(s) Inhalation two times a day  tiotropium 2.5 MICROgram(s) Inhaler 2 Puff(s) Inhalation daily    MEDICATIONS  (PRN):

## 2025-04-17 NOTE — DIETITIAN INITIAL EVALUATION ADULT - OTHER INFO
74y Female with significant PMH of COPD, pneumothorax, collapsed lung, and TIAs,  presented to the ED with sister complaining of severe right arm weakness.  Per patient she woke up on Monday ( 2 days ago) with difficulty moving and lifting right arm.  Patient thought she had a stroke but did not call 911 for evaluation.  Patient reports she was able to get around at home with her son's assistance.  Patient does reports she fell 1 time without head injury or loss of consciousness.  Patient reports her last well was Sunday night prior to going to bed.  Pt's sister reports pt called her today and told her she thought she had a stroke, so she brought her to the ED.  Pt was in bed when her sister arrived today. Patient is not having headache nausea vomiting visual changes. Patient says that "her right hand is dead for last 2 days". She has been using her left hand for the needful. It seems that she also has some right facial symmetry, but patient does not know whether its new or old.  She denies any weakness to her either lower extremities. Her NIH SS was 5 in ED, CODE stroke was not called in ED, not a candidate for tPA or thrombectomy. CT head shows age-indeterminate ischemic event on the left at the level of the corona radiata, and high right parietal encephalomalacia and gliosis with old left basal ganglia lacunar infarct likely related to prior infarct. Per neuro; SLP eval. Admitting diagnosis: CVA, right arm weakness    Pt known to RD services, seen in March (did not meet criteria at that time however refused NFPE.) Pt reports no change in appetite at this time, was reviewing menu at time of RD visit. Pt currently on soft and bite sized diet, pending SLP eval to determine least restrictive consistency that can be safely tolerated. Pt reports UBW ~130#; endorses no wt changes at this time. RD unable to obtain bedscale wt d/t error; per EMR admit wt 119# on 4/16. Wt history reveiwed: 121# on 3/18 (taken by RD). No pertinent wt changes at this time. Pt appears thin, NFPE reveals mild-mod muscle wasting ONLY. Recommend regular diet + SLP recs for consistency. Add ensure plus high protein BID to optimize PO intake (provides 350 kcal, 20g protein/ shake). Confirm goals of care regarding nutrition support. Please see additional recommendations below.

## 2025-04-17 NOTE — SWALLOW BEDSIDE ASSESSMENT ADULT - COMMENTS
The pt was admitted to  with acute onset of right facial droop and acute onset of RUE weakness. Pt reported that her motor speech integrity is at usual state. CTH is remarkable for an age indeterminate ischemic event in left Hernandez Radiata region, age indeterminate infarct in left Thalamus and old infarct in left Basal Ganglia region. She has an underlying history of Emphysema, COPD and prior pneumothorax. Pt also stated that she previously sustained 2 strokes in the past but denied residual.
(0) independent

## 2025-04-17 NOTE — CONSULT NOTE ADULT - SUBJECTIVE AND OBJECTIVE BOX
Patient is a 74y old  Female who presents with a chief complaint of RUE weakness likely CVA (2025 23:29)      HPI:  Chief Complaint: weakness.    The patient is a 74y Female with significant PMH of COPD, pneumothorax, collapsed lung, and TIAs,  presented to the ED with sister complaining of severe right arm weakness.  Per patient she woke up on Monday ( 2 days ago) with difficulty moving and lifting right arm.  Patient thought she had a stroke but did not call 911 for evaluation.  Patient reports she was able to get around at home with her son's assistance.  Patient does reports she fell 1 time without head injury or loss of consciousness.  Patient reports her last well was  night prior to going to bed.  Pt's sister reports pt called her today and told her she thought she had a stroke, so she brought her to the ED.  Pt was in bed when her sister arrived today. Patient is not having headache nausea vomiting visual changes. Patient says that "her right hand is dead for last 2 days". She has been using her left hand for the needful. It seems that she also has some right facial symmetry, but patient does not know whether its new or old.  She denies any weakness to her either lower extremities. Her NIH SS was 5 in ED, CODE stroke was not called in ED, not a candidate for tPA or thrombectomy. Otherwise, she denies fever, chills, headache, slurred speech, difficulty swallowing, chest pain, sob, N/V, abd pain, diarrhea or dysuria. She si not on any AC or antiplatelets.  Denies smoking, alcohol or substance abuse.            PAST MEDICAL & SURGICAL HISTORY:  Emphysema/COPD      TIA (transient ischemic attack)      H/O  section          FAMILY HISTORY:      Social Hx:      MEDICATIONS  (STANDING):  albuterol    90 MICROgram(s) HFA Inhaler 2 Puff(s) Inhalation every 6 hours  aspirin  chewable 81 milliGRAM(s) Oral daily  atorvastatin 80 milliGRAM(s) Oral at bedtime  enoxaparin Injectable 40 milliGRAM(s) SubCutaneous every 24 hours  fluticasone propionate/ salmeterol 100-50 MICROgram(s) Diskus 1 Dose(s) Inhalation two times a day  tiotropium 2.5 MICROgram(s) Inhaler 2 Puff(s) Inhalation daily       Allergies  No Known Allergies    Intolerances        ROS: Pertinent positives in HPI, all other ROS were reviewed and are negative.      Vital Signs Last 24 Hrs  T(C): 36.7 (2025 06:00), Max: 37.1 (2025 20:54)  T(F): 98 (2025 06:00), Max: 98.8 (2025 20:54)  HR: 72 (2025 06:00) (72 - 89)  BP: 154/77 (2025 06:00) (117/76 - 154/77)  BP(mean): 102 (2025 19:51) (99 - 111)  RR: 18 (2025 06:00) (17 - 18)  SpO2: 92% (2025 06:00) (92% - 100%)    Parameters below as of 2025 06:00  Patient On (Oxygen Delivery Method): room air                    Labs:       136  |  107  |  18  ----------------------------<  104[H]  3.2[L]   |  21[L]  |  0.65    Ca    8.9      2025 07:27    TPro  7.1  /  Alb  3.3  /  TBili  1.7[H]  /  DBili  x   /  AST  44[H]  /  ALT  23  /  AlkPhos  111                                13.6   8.87  )-----------( 336      ( 2025 16:22 )             40.0       Radiology:  < from: CT Angio Neck w/ IV Cont (25 @ 17:19) >  IMPRESSION:    CT HEAD:  High right parietal encephalomalacia and gliosis, likely related to prior   infarct in this location. Age-indeterminate ischemic event on the left at   the level of the corona radiata. Old left basal ganglia lacunar infarct.   Age-indeterminate left thalamic lacunar infarct. No acute intracranial   hemorrhage.    Findings were discussed with MOON BAKER [phone] 2025 5:24   PM by Dr. Behr Ventura with read back confirmation.      CTA NECK:  1. Deposition of calcified plaque along the proximal aspect of the   bilateral internal carotid arteries without significant stenosis or   occlusion.  2. Bilateral vertebral arteries are patent. Left vertebral artery is   dominant.    CTA HEAD:  1. Nolarge vessel occlusion.  2. Deposition of calcified plaque with mild stenosis involving the   cavernous and supraclinoid bilateral internal carotid arteries..  3. No aneurysm identified. Tiny aneurysms can be beyond the resolution of   CTA technique.          A/P:    No IV tpa given because…    -ASA/PLAVIX  -Atorvastatin  -DVT prophylaxis  -Dysphagia screen  -Speech and swallow eval  -PT eval/ rehab eval    Mangement d/w Pt / family /     Total Critical Care Time spent:   CC: CVA      HPI:  74y Female with significant PMH of COPD, TIAs not on ASA at home,  presented to the ED  complaining of severe right arm weakness.  Per patient she woke up on Monday ( 2 days ago) with difficulty moving and lifting right arm.  Patient thought she had a stroke but did not call 911 for evaluation.  Patient reports she was able to get around at home with her son's assistance.  Patient does reports she fell 1 time without head injury or loss of consciousness.  LKW  night.  Denies diplopia, dysarthria.  +R facial weakness-unclear how long.   Her NIHSS was 5 in ED, CODE stroke was not called in ED.  CTH showed high R parietal encephalomalacia and gliosis related likely to prior infarct, age indeterminate ischemic event on the L CR, old BG L lacunar infarct, age indeterminate L thalamic lacunar infarct.  She was not a candidate for IV thrombolytics because she was out of the window.  CTA was neg for LVO, not a candidate for thrombectomy because she was OOTW.   MRI brain this morning + Acute L infarct CR.            PAST MEDICAL & SURGICAL HISTORY:  Emphysema/COPD      TIA (transient ischemic attack)      H/O  section          FAMILY HISTORY: non-contributory      Social Hx:  Smoked 2ppd x 30 yrs, quit last year    MEDICATIONS  (STANDING):  albuterol    90 MICROgram(s) HFA Inhaler 2 Puff(s) Inhalation every 6 hours  aspirin  chewable 81 milliGRAM(s) Oral daily  atorvastatin 80 milliGRAM(s) Oral at bedtime  enoxaparin Injectable 40 milliGRAM(s) SubCutaneous every 24 hours  fluticasone propionate/ salmeterol 100-50 MICROgram(s) Diskus 1 Dose(s) Inhalation two times a day  tiotropium 2.5 MICROgram(s) Inhaler 2 Puff(s) Inhalation daily       Allergies  No Known Allergies        ROS: Pertinent positives in HPI, all other ROS were reviewed and are negative.      Vital Signs Last 24 Hrs  T(C): 36.7 (2025 06:00), Max: 37.1 (2025 20:54)  T(F): 98 (2025 06:00), Max: 98.8 (2025 20:54)  HR: 72 (2025 06:00) (72 - 89)  BP: 154/77 (2025 06:00) (117/76 - 154/77)  BP(mean): 102 (2025 19:51) (99 - 111)  RR: 18 (2025 06:00) (17 - 18)  SpO2: 92% (2025 06:00) (92% - 100%)    Parameters below as of 2025 06:00  Patient On (Oxygen Delivery Method): room air    GEN:  Constitutional: awake, NAD  HEAD: Normocephalic  Neck: Supple  Extremities:  no edema  Musculoskeletal:  no abnormal movements  Skin: No rashes    Neurological exam:  HF: A x O x 3. Appropriately interactive, normal affect. Speech fluent, No Aphasia or paraphasic errors. Naming /repetition intact   CN: AYLIN, EOMI, VFF, facial sensation normal, R NLFD, tongue midline, Palate moves equally, mild dysarthria 2/2 dentures  Motor: Dense RUE plegia, RLE 4/5, 5/5 L side, normal bulk and tone, no tremors   Sens: Intact to light touch    Reflexes: Symmetric and normal, downgoing toes b/l  Coord:  HTS intact b/L, RUE plegia, nml L FTN  Gait/Balance: Cannot test    NIHSS:  5              Labs:       136  |  107  |  18  ----------------------------<  104[H]  3.2[L]   |  21[L]  |  0.65    Ca    8.9      2025 07:27    TPro  7.1  /  Alb  3.3  /  TBili  1.7[H]  /  DBili  x   /  AST  44[H]  /  ALT  23  /  AlkPhos  111                                13.6   8.87  )-----------( 336      ( 2025 16:22 )             40.0       Radiology:  < from: TTE W or WO Ultrasound Enhancing Agent (25 @ 07:33) >  CONCLUSIONS:      1. Left ventricular cavity is normal in size. Left ventricular wall thickness is normal. Left ventricular systolic function is normal with an ejection fraction of 68 % by Chandler's method of disks.  2. Normal right ventricular cavity size and normal right ventricular systolic function.   3. Trace mitral regurgitation.   4. Mild tricuspid regurgitation.   5. Estimated pulmonary artery systolic pressure is 19 mmHg.   6. Trace aortic regurgitation.      < from: MR Head No Cont (25 @ 10:13) >    IMPRESSION:    1.  Acute infarct in the left corona radiata.  2.  Chronic ischemic changes as discussed above.    < from: CT Angio Neck w/ IV Cont (25 @ 17:19) >  IMPRESSION:    CT HEAD:  High right parietal encephalomalacia and gliosis, likely related to prior   infarct in this location. Age-indeterminate ischemic event on the left at   the level of the corona radiata. Old left basal ganglia lacunar infarct.   Age-indeterminate left thalamic lacunar infarct. No acute intracranial   hemorrhage.    Findings were discussed with MOON BAKER [id number] 2025 5:24   PM by Dr. Behr Ventura with read back confirmation.      CTA NECK:  1. Deposition of calcified plaque along the proximal aspect of the   bilateral internal carotid arteries without significant stenosis or   occlusion.  2. Bilateral vertebral arteries are patent. Left vertebral artery is   dominant.    CTA HEAD:  1. Nolarge vessel occlusion.  2. Deposition of calcified plaque with mild stenosis involving the   cavernous and supraclinoid bilateral internal carotid arteries..  3. No aneurysm identified. Tiny aneurysms can be beyond the resolution of   CTA technique.          A/P:    No IV tpa given because…    -ASA/PLAVIX  -Atorvastatin  -DVT prophylaxis  -Dysphagia screen  -Speech and swallow eval  -PT eval/ rehab eval    Mangement d/w Pt / family /     Total Critical Care Time spent:   CC: CVA      HPI:   74 y Female with significant PMHx of COPD, TIA's not on ASA,  presented to the ED on  complaining of severe right arm weakness.  Per patient she woke up on Monday ( 2 days ago) with difficulty moving and lifting right arm.  Patient thought she had a stroke but did not call 911 for evaluation, reports she was able to get around at home with her son's assistance.  Patient does reports she fell 1 time without head injury or loss of consciousness. Denies diplopia, dysarthria.  +R facial weakness-unclear how long, LKW , NIHSS was 5 in ED, CODE stroke not called in ED. CTH showed high R parietal encephalomalacia and gliosis related likely to prior infarct, age indeterminate ischemic event on the L CR, old BG L lacunar infarct, age indeterminate L thalamic lacunar infarct.  She was not a candidate for IV thrombolytics because she was out of the window.  CTA was neg for LVO, not a candidate for thrombectomy because she was OOTW.   MRI brain this morning + Acute L infarct CR.            PAST MEDICAL & SURGICAL HISTORY:  Emphysema/COPD      TIA (transient ischemic attack)      H/O  section          FAMILY HISTORY: non-contributory      Social Hx:  Smoked 2ppd x 30 yrs, quit last year      MEDICATIONS  (STANDING):  albuterol    90 MICROgram(s) HFA Inhaler 2 Puff(s) Inhalation every 6 hours  aspirin  chewable 81 milliGRAM(s) Oral daily  atorvastatin 80 milliGRAM(s) Oral at bedtime  enoxaparin Injectable 40 milliGRAM(s) SubCutaneous every 24 hours  fluticasone propionate/ salmeterol 100-50 MICROgram(s) Diskus 1 Dose(s) Inhalation two times a day  tiotropium 2.5 MICROgram(s) Inhaler 2 Puff(s) Inhalation daily       Allergies  No Known Allergies        ROS: Pertinent positives in HPI, all other ROS were reviewed and are negative.      Vital Signs Last 24 Hrs  T(C): 36.7 (2025 06:00), Max: 37.1 (2025 20:54)  T(F): 98 (2025 06:00), Max: 98.8 (2025 20:54)  HR: 72 (2025 06:00) (72 - 89)  BP: 154/77 (2025 06:00) (117/76 - 154/77)  BP(mean): 102 (2025 19:51) (99 - 111)  RR: 18 (2025 06:00) (17 - 18)  SpO2: 92% (2025 06:00) (92% - 100%)    Parameters below as of 2025 06:00  Patient On (Oxygen Delivery Method): room air    GEN:  Constitutional: awake, NAD  HEAD: Normocephalic  Neck: Supple  Extremities:  no edema  Musculoskeletal:  no abnormal movements  Skin: No rashes    Neurological exam:  HF: A x O x 3. Appropriately interactive, normal affect. Speech fluent, No Aphasia or paraphasic errors. Naming /repetition intact   CN: AYLIN, EOMI, VFF, facial sensation normal, R NLFD, tongue midline, Palate moves equally, mild dysarthria 2/2 dentures  Motor: Dense RUE plegia, RLE 4/5, 5/5 L side, normal bulk and tone, no tremors   Sens: Intact to light touch    Reflexes: Symmetric and normal, downgoing toes b/l  Coord:  HTS intact b/L, RUE plegia, nml L FTN  Gait/Balance: Cannot test    NIHSS:  5              Labs:       136  |  107  |  18  ----------------------------<  104[H]  3.2[L]   |  21[L]  |  0.65    Ca    8.9      2025 07:27    TPro  7.1  /  Alb  3.3  /  TBili  1.7[H]  /  DBili  x   /  AST  44[H]  /  ALT  23  /  AlkPhos  111                                13.6   8.87  )-----------( 336      ( 2025 16:22 )             40.0       Radiology:  < from: TTE W or WO Ultrasound Enhancing Agent (25 @ 07:33) >  CONCLUSIONS:      1. Left ventricular cavity is normal in size. Left ventricular wall thickness is normal. Left ventricular systolic function is normal with an ejection fraction of 68 % by Chandler's method of disks.  2. Normal right ventricular cavity size and normal right ventricular systolic function.   3. Trace mitral regurgitation.   4. Mild tricuspid regurgitation.   5. Estimated pulmonary artery systolic pressure is 19 mmHg.   6. Trace aortic regurgitation.      < from: MR Head No Cont (25 @ 10:13) >    IMPRESSION:    1.  Acute infarct in the left corona radiata.  2.  Chronic ischemic changes as discussed above.    < from: CT Angio Neck w/ IV Cont (25 @ 17:19) >  IMPRESSION:    CT HEAD:  High right parietal encephalomalacia and gliosis, likely related to prior   infarct in this location. Age-indeterminate ischemic event on the left at   the level of the corona radiata. Old left basal ganglia lacunar infarct.   Age-indeterminate left thalamic lacunar infarct. No acute intracranial   hemorrhage.    Findings were discussed with MOON BAKER [id number] 2025 5:24   PM by Dr. Behr Ventura with read back confirmation.      CTA NECK:  1. Deposition of calcified plaque along the proximal aspect of the   bilateral internal carotid arteries without significant stenosis or   occlusion.  2. Bilateral vertebral arteries are patent. Left vertebral artery is   dominant.    CTA HEAD:  1. Nolarge vessel occlusion.  2. Deposition of calcified plaque with mild stenosis involving the   cavernous and supraclinoid bilateral internal carotid arteries..  3. No aneurysm identified. Tiny aneurysms can be beyond the resolution of   CTA technique.

## 2025-04-17 NOTE — SWALLOW BEDSIDE ASSESSMENT ADULT - ASR SWALLOW DENTITION
Notable for presence of ill fitting maxillary denture which is in compromised condition. Pt has natural mandibular dentition in place but is missing some lower teeth.

## 2025-04-17 NOTE — SWALLOW BEDSIDE ASSESSMENT ADULT - SWALLOW EVAL: RECOMMENDED DIET
SUGGEST A REGULAR CONSISTENCY DIET WITH THIN LIQUID TEXTURES AS THE PATIENT APPEARS CLINICALLY TOLERANT OF THESE FOOD CONSISTENCIES FROM AN OROPHARYNGEAL SWALLOWING PERSPECTIVE ON EXAM, DESPITE DENTAL LIMITATIONS. MOREOVER, FOOD CONSISTENCIES ON THIS DIET BEST ACCOMMODATES HER EXPRESSED FOOD PREFERENCES.

## 2025-04-17 NOTE — OCCUPATIONAL THERAPY INITIAL EVALUATION ADULT - RANGE OF MOTION EXAMINATION, UPPER EXTREMITY
LUE: WFL, RUE: limited AROM, gravity eliminated at elbow less than full, wrist gravity eliminated less than full, hand no AROM noted, shoulder no AROM (PROM WFL with both, shoulder assessed to 90 degrees)

## 2025-04-17 NOTE — CONSULT NOTE ADULT - CONSULT REASON
cva, onset 4/13, basal ganglia on left, right arm deficit and lower face, nihss of 5 CVA, onset 4/13, basal ganglia on left, right arm deficit and lower face, NIHSS - 5

## 2025-04-17 NOTE — OCCUPATIONAL THERAPY INITIAL EVALUATION ADULT - PERTINENT HX OF CURRENT PROBLEM, REHAB EVAL
Per chart, pt is a 75 y/o female with significant PMHx of COPD, pneumothorax, collapsed lung, and TIAs,  presented to the ED with sister complaining of severe right arm weakness.  Per patient she woke up on Monday ( 2 days ago) with difficulty moving and lifting right arm.  Patient thought she had a stroke but did not call 911 for evaluation.  Patient reports she was able to get around at home with her son's assistance.  Patient does reports she fell 1 time without head injury or loss of consciousness. NIH SS was 5 in ED. RUE weakness with right facial asymmetry likely CVA with monoplegia.    -CT head shows age-indeterminate ischemic event on the left at the level of the corona radiata, and high right parietal encephalomalacia and gliosis with old left basal ganglia lacunar infarct likely related to prior infarct.  -MRI brain- (+) Acute L infarct corona radiata.

## 2025-04-17 NOTE — DIETITIAN INITIAL EVALUATION ADULT - PERTINENT LABORATORY DATA
04-17    136  |  107  |  18  ----------------------------<  104[H]  3.2[L]   |  21[L]  |  0.65    Ca    8.9      17 Apr 2025 07:27    TPro  7.1  /  Alb  3.3  /  TBili  1.7[H]  /  DBili  x   /  AST  44[H]  /  ALT  23  /  AlkPhos  111  04-16  POCT Blood Glucose.: 95 mg/dL (04-16-25 @ 16:25)  A1C with Estimated Average Glucose Result: 5.8 % (03-17-25 @ 07:14)

## 2025-04-17 NOTE — DIETITIAN INITIAL EVALUATION ADULT - ADD RECOMMEND
1. Recommend regular diet + SLP recs  2. Add ensure plus high protein BID to optimize PO intake (provides 350 kcal, 20g protein/ shake)  3. Recommend to add MVI w/minerals, Vit C 500 mg BID, add Zinc Sulfate 220 mg x 10 days to promote wound healing.  4. Consider adding thiamine 100 mg daily 2/2 poor PO intake  5. Monitor daily lytes/min and replete prn  6. Monitor bowel movements, if no BM for >3 days, consider implementing bowel regimen.  7. Obtain weekly wt to track/trend changes  8. suggest to obtain SLP eval in order to determine appropriate diet/liquid consistency pt can best tolerate.  9. Confirm goals of care regarding nutrition support   RD will continue to monitor PO intake, labs, hydration, and wt prn.

## 2025-04-17 NOTE — PHYSICAL THERAPY INITIAL EVALUATION ADULT - BED MOBILITY LIMITATIONS, REHAB EVAL
Have you been tested for COVID yes preop tested 6/12/2020         Have you been told you were positive for COVID No  Have you had any known exposure to someone that is positive for COVID No  Do you have a cough                   No              Do you have shortness of breath No                 Do you have a sore throat            No                Are you having chills                    No                Are you having muscle aches. No                    Please come to the hospital wearing a mask and have your significant other wear a mask as well. Both of you should check your temperature before leaving to come here,  if it is 100 or higher please call [phone] for instruction. decreased ability to use arms for pushing/pulling

## 2025-04-17 NOTE — SWALLOW BEDSIDE ASSESSMENT ADULT - NS SPL SWALLOW CLINIC TRIAL FT
Pt unable to use RUE & fed self with LUE. Pt stated that she is naturally right handed. On exam, Oral Processing w/foods that require mastication is mildly prolonged in setting of dental compromise but mechanically functional nonetheless. Pt w/ill fitting maxillary denture in place that is in poor condition. Pt also w/natural mandibular dentition in place but is missing some lower teeth. Bolus formation/transfer with puree foods/liquids were achieved via age acceptable AP lingual actions. Pt cleared oral debris after age acceptable piecemeal deglutition. Moreover, pharyngeal integrity subjectively appeared to be grossly within functional parameters for age. Swallow triggered in an acceptable time frame for age/laryngeal lift on palpation during swallowing trials was felt to be functional for age as well. NO behavioral aspiration signs exhibited. Odynophagia denied. Note that pt's mild right lip lag does not seem to hinder deglutition competence & oropharyngeal swallow integrity felt to be at baseline.

## 2025-04-17 NOTE — SWALLOW BEDSIDE ASSESSMENT ADULT - SWALLOW EVAL: PROGNOSIS
2) Pt unable to use RUE & fed self with LUE. Pt stated that she is naturally right handed. On exam, Oral Processing w/foods that require mastication is mildly prolonged in setting of dental compromise but mechanically functional. Pt w/ill fitting maxillary denture in place that is in poor condition. Pt also w/natural mandibular dentition in place but is missing some lower teeth. Bolus formation/transfer with puree foods/liquids were achieved via age acceptable AP lingual actions. Pt cleared oral debris after age acceptable piecemeal deglutition. Moreover, pharyngeal integrity subjectively appears to be grossly within functional parameters for age. Swallow triggered in an acceptable time frame for age/laryngeal lift on palpation during swallowing trials was felt to be functional for age as well. NO behavioral aspiration signs exhibited. Odynophagia denied. Pt's mild right lip lag does not seem to hinder deglutition competence & oropharyngeal swallow integrity felt to be at baseline.

## 2025-04-17 NOTE — OCCUPATIONAL THERAPY INITIAL EVALUATION ADULT - STRENGTHENING, PT EVAL
Pt will improve RUE strength by 1/2 grade in order to promote increased ease w/ fxl transfers in 2 weeks.

## 2025-04-17 NOTE — SWALLOW BEDSIDE ASSESSMENT ADULT - SLP GENERAL OBSERVATIONS
On encounter, RUE weakness is evident and a mild right lip lag is apparent but lip strength seems functional nonetheless. The pt was alert and interactive but irritable at times. The pt was able to verbalize during communicative probes via linguistically intact, contextually appropriate utterances without evidence of a primary linguistic pathology. Additionally, it is noted that pt's speech output was mildly distorted which is at least partially, if not completely, a result of an ill fitting maxillary denture that is in compromised condition. Pt's speech intelligibility is functional nonetheless, she is able to verbalize her needs and she insists that her speech-language integrity is currently at baseline.

## 2025-04-17 NOTE — PHYSICAL THERAPY INITIAL EVALUATION ADULT - GENERAL OBSERVATIONS, REHAB EVAL
Pt. received semi supine in bed +tele agreeable to PT. Bed mob with Min/Mod A, sit to stand and amb to BSC RW Mod A, RLE buckled, Pt. left seated on BSC + alajaky MENDOZA flaccid elevated on pillow.

## 2025-04-17 NOTE — DIETITIAN INITIAL EVALUATION ADULT - ORAL INTAKE PTA/DIET HISTORY
Pt reports lives in an apt w/ her son. Pt will shop and cook for household. Endorses "good" appetite; typically consumes 2 meals per day. Pt w/ ? CVA ? accuracy of information obtained.

## 2025-04-17 NOTE — SWALLOW BEDSIDE ASSESSMENT ADULT - ASR SWALLOW RECOMMEND DIAG
DEFER MBS GIVEN OBSERVED CLINICAL TOLERANCE TO SUGGESTED FOOD CONSISTENCIES FROM AN OROPHARYNGEAL SWALLOWING PERSPECTIVE ON EXAM.

## 2025-04-17 NOTE — SWALLOW BEDSIDE ASSESSMENT ADULT - SWALLOW EVAL: DIAGNOSIS
1) On encounter, RUE weakness is evident and a mild right lip lag is apparent but lip strength seems functional nonetheless. The pt was alert and interactive but irritable at times. The pt was able to verbalize during communicative probes via linguistically intact, contextually appropriate utterances without evidence of a primary linguistic pathology. Additionally, it is noted that pt's speech output was mildly distorted which is at least partially, if not completely a result of an ill fitting maxillary denture that is in compromised condition. Pt's speech intelligibility is functional nonetheless, she is able to verbalize her needs and she insists that her speech-language integrity is currently at baseline. 1) On encounter, RUE weakness is evident and a mild right lip lag is apparent but lip strength seems functional nonetheless. The pt was alert and interactive but irritable at times. The pt was able to verbalize during communicative probes via linguistically intact, contextually appropriate utterances without evidence of a primary linguistic pathology. Additionally, it is noted that pt's speech output was mildly distorted which is at least partially, if not completely, a result of an ill fitting maxillary denture that is in compromised condition. Pt's speech intelligibility is functional nonetheless, she is able to verbalize her needs and she insists that her speech-language integrity is currently at baseline.

## 2025-04-17 NOTE — SWALLOW BEDSIDE ASSESSMENT ADULT - SWALLOW EVAL: FEEDING ASSISTANCE
PT BENEFITTED FROM ASSIST WITH TRAY SET UP AS SHE IS RIGHT HANDED AND FORCED TO USE LUE TO FEED GIVEN RUE WEAKNESS.

## 2025-04-17 NOTE — OCCUPATIONAL THERAPY INITIAL EVALUATION ADULT - MD ORDER
"OT Evaluate and Treat"- MD orders received. Chart reviewed, contents noted, conferred with RN. Please refer to Therapeutic Interventions under Adult A & I for future documentation and treatment notes.

## 2025-04-17 NOTE — OCCUPATIONAL THERAPY INITIAL EVALUATION ADULT - NSACTIVITYREC_GEN_A_OT
Pt presents with impaired balance, endurance and muscle strength that will benefit from skilled OT to improve independence in ADLs, reduce fall risk and chance for readmission. Pt educated on compensatory ADL techniques and safe positioning in bed of RULE.

## 2025-04-17 NOTE — PHYSICAL THERAPY INITIAL EVALUATION ADULT - PASSIVE RANGE OF MOTION EXAMINATION, REHAB EVAL
no Passive ROM deficits were identified/bilateral lower extremity Passive ROM was WNL/bilateral upper extremity Passive ROM was WFL (within functional limits)

## 2025-04-18 ENCOUNTER — TRANSCRIPTION ENCOUNTER (OUTPATIENT)
Age: 75
End: 2025-04-18

## 2025-04-18 ENCOUNTER — INPATIENT (INPATIENT)
Facility: HOSPITAL | Age: 75
LOS: 11 days | Discharge: NURSING, REHAB OR LONG-TERM CARE | DRG: 66 | End: 2025-04-30
Attending: PHYSICAL MEDICINE & REHABILITATION | Admitting: PHYSICAL MEDICINE & REHABILITATION
Payer: MEDICARE

## 2025-04-18 VITALS
WEIGHT: 117.51 LBS | RESPIRATION RATE: 16 BRPM | HEIGHT: 64 IN | DIASTOLIC BLOOD PRESSURE: 69 MMHG | OXYGEN SATURATION: 93 % | SYSTOLIC BLOOD PRESSURE: 160 MMHG | TEMPERATURE: 98 F | HEART RATE: 76 BPM

## 2025-04-18 VITALS
OXYGEN SATURATION: 93 % | DIASTOLIC BLOOD PRESSURE: 83 MMHG | HEART RATE: 92 BPM | TEMPERATURE: 98 F | SYSTOLIC BLOOD PRESSURE: 121 MMHG | RESPIRATION RATE: 18 BRPM

## 2025-04-18 DIAGNOSIS — Z98.891 HISTORY OF UTERINE SCAR FROM PREVIOUS SURGERY: Chronic | ICD-10-CM

## 2025-04-18 DIAGNOSIS — I63.9 CEREBRAL INFARCTION, UNSPECIFIED: ICD-10-CM

## 2025-04-18 LAB
ANION GAP SERPL CALC-SCNC: 5 MMOL/L — SIGNIFICANT CHANGE UP (ref 5–17)
BUN SERPL-MCNC: 16 MG/DL — SIGNIFICANT CHANGE UP (ref 7–23)
CALCIUM SERPL-MCNC: 8.7 MG/DL — SIGNIFICANT CHANGE UP (ref 8.5–10.1)
CHLORIDE SERPL-SCNC: 114 MMOL/L — HIGH (ref 96–108)
CO2 SERPL-SCNC: 21 MMOL/L — LOW (ref 22–31)
CREAT SERPL-MCNC: 0.58 MG/DL — SIGNIFICANT CHANGE UP (ref 0.5–1.3)
EGFR: 95 ML/MIN/1.73M2 — SIGNIFICANT CHANGE UP
EGFR: 95 ML/MIN/1.73M2 — SIGNIFICANT CHANGE UP
GLUCOSE SERPL-MCNC: 108 MG/DL — HIGH (ref 70–99)
POTASSIUM SERPL-MCNC: 4.3 MMOL/L — SIGNIFICANT CHANGE UP (ref 3.5–5.3)
POTASSIUM SERPL-SCNC: 4.3 MMOL/L — SIGNIFICANT CHANGE UP (ref 3.5–5.3)
SARS-COV-2 RNA SPEC QL NAA+PROBE: SIGNIFICANT CHANGE UP
SODIUM SERPL-SCNC: 140 MMOL/L — SIGNIFICANT CHANGE UP (ref 135–145)

## 2025-04-18 PROCEDURE — 99239 HOSP IP/OBS DSCHRG MGMT >30: CPT

## 2025-04-18 PROCEDURE — 99232 SBSQ HOSP IP/OBS MODERATE 35: CPT

## 2025-04-18 RX ORDER — ALBUTEROL SULFATE 2.5 MG/3ML
2 VIAL, NEBULIZER (ML) INHALATION EVERY 6 HOURS
Refills: 0 | Status: DISCONTINUED | OUTPATIENT
Start: 2025-04-18 | End: 2025-04-30

## 2025-04-18 RX ORDER — ACETAMINOPHEN 500 MG/5ML
650 LIQUID (ML) ORAL EVERY 6 HOURS
Refills: 0 | Status: DISCONTINUED | OUTPATIENT
Start: 2025-04-18 | End: 2025-04-30

## 2025-04-18 RX ORDER — ENOXAPARIN SODIUM 100 MG/ML
40 INJECTION SUBCUTANEOUS EVERY 24 HOURS
Refills: 0 | Status: DISCONTINUED | OUTPATIENT
Start: 2025-04-19 | End: 2025-04-30

## 2025-04-18 RX ORDER — ATORVASTATIN CALCIUM 80 MG/1
80 TABLET, FILM COATED ORAL AT BEDTIME
Refills: 0 | Status: DISCONTINUED | OUTPATIENT
Start: 2025-04-18 | End: 2025-04-30

## 2025-04-18 RX ORDER — SENNA 187 MG
2 TABLET ORAL AT BEDTIME
Refills: 0 | Status: DISCONTINUED | OUTPATIENT
Start: 2025-04-18 | End: 2025-04-30

## 2025-04-18 RX ORDER — POLYETHYLENE GLYCOL 3350 17 G/17G
17 POWDER, FOR SOLUTION ORAL DAILY
Refills: 0 | Status: DISCONTINUED | OUTPATIENT
Start: 2025-04-18 | End: 2025-04-30

## 2025-04-18 RX ORDER — CLOPIDOGREL BISULFATE 75 MG/1
1 TABLET, FILM COATED ORAL
Qty: 0 | Refills: 0 | DISCHARGE
Start: 2025-04-18 | End: 2025-05-09

## 2025-04-18 RX ORDER — CLOPIDOGREL BISULFATE 75 MG/1
75 TABLET, FILM COATED ORAL DAILY
Refills: 0 | Status: DISCONTINUED | OUTPATIENT
Start: 2025-04-19 | End: 2025-04-30

## 2025-04-18 RX ORDER — ASPIRIN 325 MG
1 TABLET ORAL
Qty: 0 | Refills: 0 | DISCHARGE
Start: 2025-04-18

## 2025-04-18 RX ORDER — TIOTROPIUM BROMIDE INHALATION SPRAY 3.12 UG/1
2 SPRAY, METERED RESPIRATORY (INHALATION) DAILY
Refills: 0 | Status: DISCONTINUED | OUTPATIENT
Start: 2025-04-19 | End: 2025-04-19

## 2025-04-18 RX ORDER — ATORVASTATIN CALCIUM 80 MG/1
1 TABLET, FILM COATED ORAL
Qty: 0 | Refills: 0 | DISCHARGE
Start: 2025-04-18

## 2025-04-18 RX ORDER — ASPIRIN 325 MG
81 TABLET ORAL DAILY
Refills: 0 | Status: DISCONTINUED | OUTPATIENT
Start: 2025-04-19 | End: 2025-04-30

## 2025-04-18 RX ORDER — LOSARTAN POTASSIUM 100 MG/1
1 TABLET, FILM COATED ORAL
Qty: 30 | Refills: 0
Start: 2025-04-18 | End: 2025-05-17

## 2025-04-18 RX ORDER — LOSARTAN POTASSIUM 100 MG/1
25 TABLET, FILM COATED ORAL DAILY
Refills: 0 | Status: DISCONTINUED | OUTPATIENT
Start: 2025-04-18 | End: 2025-04-30

## 2025-04-18 RX ADMIN — ENOXAPARIN SODIUM 40 MILLIGRAM(S): 100 INJECTION SUBCUTANEOUS at 05:47

## 2025-04-18 RX ADMIN — Medication 2 TABLET(S): at 21:41

## 2025-04-18 RX ADMIN — Medication 81 MILLIGRAM(S): at 11:17

## 2025-04-18 RX ADMIN — ATORVASTATIN CALCIUM 80 MILLIGRAM(S): 80 TABLET, FILM COATED ORAL at 21:41

## 2025-04-18 RX ADMIN — FLUTICASONE FUROATE, UMECLIDINIUM BROMIDE AND VILANTEROL TRIFENATATE 1 PUFF(S): 100; 62.5; 25 POWDER RESPIRATORY (INHALATION) at 09:42

## 2025-04-18 RX ADMIN — CLOPIDOGREL BISULFATE 75 MILLIGRAM(S): 75 TABLET, FILM COATED ORAL at 11:17

## 2025-04-18 NOTE — PROGRESS NOTE ADULT - ASSESSMENT
74y Female with significant PMH of COPD, pneumothorax, collapsed lung, and TIAs,  presented to the ED with sister complaining of severe right arm weakness.  Per patient she woke up on Monday ( 2 days ago) with difficulty moving and lifting right arm.    Pt admitted with      # RUE/Right leg weakness  with right facial asymmetry + dysarthria d/t acute CVA: Acute infarct in the left corona radiata.  -CT head shows age-indeterminate ischemic event on the left at the level of the corona radiata, and high right parietal encephalomalacia and gliosis with old left basal ganglia lacunar infarct likely related to prior infarct.  -Admit to telemetry.  -Has passed bedside swallow study.  -Vitals and neuro-checks q4h.  -Lipid panel and A1c level.  -MRI brain and 2D Echo as above  -ASA, plavix  and Atorvastatin started.  -PT/OT.  -appreciate neurology consult.  Physiatry consult  acute rehab     # Abnormal LFTs with elevated T. codie and AST.  -T. Bili 1.70 ans AST 44.  -Will get acute hepatitis panel.    # COPD.  -Not in acute exacerbation.  -C/w her home inhalers.    # DVT prophylaxis: Lovenox sq daily.      
74y Female with significant PMH of COPD, TIAs not on ASA at home,  presented to the ED 4/16 complaining of severe right arm weakness.  Per patient she woke up on Monday ( 2 days ago) with difficulty moving and lifting right arm.  thought she had a stroke but did not call 911, reports she was able to get around at home with her son's assistance, fell 1 time without head injury or loss of consciousness. Denies diplopia, dysarthria.  +R facial weakness-unclear how long. NIHSS was 5 in ED, CODE stroke was not called in ED.  CTH high R parietal encephalomalacia and gliosis related likely to prior infarct, age indeterminate L CR, old BG L lacunar infarct, L thalamic lacunar infarct.  She was not a candidate for IV thrombolytics because she was out of the window. CTA was neg for LVO, not a candidate for thrombectomy because she was OOTW.   MRI brain this morning reveals acute L infarct CR.  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 NIHSS 5    #Hyperlipidemia and prediabetes    Recommendations  --Continue ASA, add Plavix 75 mg x 3 weeks  - HD statin  -LT cardiac monitoring  -Rehab placement    D/W patient and Dr. Romero    Call neuro if needed henceforth

## 2025-04-18 NOTE — H&P ADULT - NSHPSOCIALHISTORY_GEN_ALL_CORE
SOCIAL HISTORY  Smoking - Denied  EtOH - Denied   Drugs - Denied    FUNCTIONAL HISTORY  Lives in an apartment without steps with son who has TBI   Prior Level of Function: Independent in ADLs and ambulation    CURRENT FUNCTIONAL STATUS  - Bed Mobility: min  - Transfers:  mod  - Gait: mod  - ADLs: min-mod SOCIAL HISTORY  Smoking - Previous smoker, now quit   EtOH - 1 beer/1 wine glass during the holidays  Drugs - Denied    FUNCTIONAL HISTORY  Lives in an apartment without steps with son who has TBI   Prior Level of Function: Independent in ADLs and ambulation    CURRENT FUNCTIONAL STATUS  - Bed Mobility: min  - Transfers:  mod  - Gait: mod  - ADLs: min-mod

## 2025-04-18 NOTE — H&P ADULT - NSHPPHYSICALEXAM_GEN_ALL_CORE
PHYSICAL EXAM  VITALS  T(C): 36.7 (04-18-25 @ 16:49), Max: 36.8 (04-18-25 @ 08:08)  HR: 76 (04-18-25 @ 16:49) (68 - 92)  BP: 160/69 (04-18-25 @ 16:49) (121/83 - 160/69)  RR: 16 (04-18-25 @ 16:49) (16 - 18)  SpO2: 93% (04-18-25 @ 16:49) (91% - 98%)    Gen - NAD, Comfortable  HEENT - NCAT, EOMI, MMM, PERRLA, Normal Conjunctivae  Neck - Supple, No limited ROM  Pulm - CTAB, No wheeze, No rhonchi, No crackles  Cardiovascular - RRR, S1S2, No murmurs  Chest - good chest expansion, good respiratory effort  Abdomen - Soft, NT, +BS, +abd hernia  Extremities - No C/C/E, no calf tenderness  Neuro-     Cognitive - awake, alert, oriented to person, place, month, year, and situation.  Able  to follow command     Communication - Fluent, Comprehensible, + slight dysarthric , +repetition intact      Attention: Intact, able to state days of week chronologically and backwards with persistent prompting. Able to perform      simple additions and subtractions     Memory: Recall 1/3 3 objects immediate and 3 min later,     Cranial Nerves -R NLF asymmetry , Tongue midline, EOMI, L Shoulder shrug intact     Motor - Right hemiparesis                    LEFT    UE - ShAB 5/5, EF 5/5, EE 5/5,  5/5                    RIGHT UE - ShAB 1/5, EF 2/5, EE 2/5,   1/5                    LEFT    LE - HF 4/5, KE 5/5, DF 5/5, PF 5/5                    RIGHT LE - HF 4/5, KE 5/5, DF 2/5, PF 3/5        Sensory - Intact  to LT     Coordination - FTN/HTS impaired to right 2/2 weakness, +slight dysmetria RLE     Tone - Normal  Psychiatric - Mood stable, Affect WNL  Skin:  b/l blanchable redness heels, BUE ecchymosis, 6.5cm gluteal fissure PHYSICAL EXAM  VITALS  T(C): 36.7 (04-18-25 @ 16:49), Max: 36.8 (04-18-25 @ 08:08)  HR: 76 (04-18-25 @ 16:49) (68 - 92)  BP: 160/69 (04-18-25 @ 16:49) (121/83 - 160/69)  RR: 16 (04-18-25 @ 16:49) (16 - 18)  SpO2: 93% (04-18-25 @ 16:49) (91% - 98%)    Gen - NAD, Comfortable  HEENT - NCAT, EOMI, PERRLA, Normal Conjunctivae  Neck - Supple, No limited ROM  Pulm - CTAB, No wheeze, No rhonchi, No crackles  Cardiovascular - RRR, S1S2, No murmurs  Chest - good chest expansion, good respiratory effort  Abdomen - Soft, NT, ND, (+) BS, (+) abd hernia  Extremities - No C/C/E, no calf tenderness  Neuro-     Cognitive - awake, alert, oriented to person, place, month, year, and situation.  Able  to follow command     Communication - Fluent, Comprehensible, (+) slight dysarthric , (+) repetition intact      Attention: Intact, able to state days of week chronologically and backwards with persistent prompting. Able to perform      simple additions and subtractions, gets agitated easily     Memory: Recall 1/3 3 objects immediate and 3 min later,     Cranial Nerves -R NLF asymmetry , Tongue midline, EOMI, L Shoulder shrug intact     Motor - Right hemiparesis                    LEFT    UE - ShAB 5/5, EF 5/5, EE 5/5,  5/5                    RIGHT UE - ShAB 1/5, EF 2/5, EE 2/5,   1/5                    LEFT    LE - HF 4/5, KE 5/5, DF 5/5, PF 5/5                    RIGHT LE - HF 4/5, KE 4/5, DF 2/5, PF 3/5        Sensory - Intact  to LT     Coordination - FTN/HTS impaired to right 2/2 weakness, +slight dysmetria RLE     Tone - Normal  Psychiatric - Mood stable, Affect WNL  Skin:  b/l blanchable redness heels, BUE ecchymosis, 6.5cm gluteal fissure, Stage I gluteal cleft pressure injury

## 2025-04-18 NOTE — H&P ADULT - HISTORY OF PRESENT ILLNESS
74 year old female with PMH of  COPD, pneumothorax, collapsed lung, and TIAs who presented to the ED at [place] on 4/16 complaining of severe right arm weakness. Per patient she woke up on  2 days prior to admit  with difficulty moving and lifting right arm. NIHSS was 5 in ED.  CTH showed high R parietal encephalomalacia and gliosis related likely to prior infarct, age indeterminate L CR, old BG L lacunar infarct, L thalamic lacunar infarct.  She was not a candidate for IV thrombolytics because she was out of the window. CTA was neg for LVO.  MRI brain this morning reveals acute L infarct in the corona radiate. She was placed on DAPT. Course complicated by elevated LFTs with workup unrevealing. She was evaluated for admission to acute inpatient rehab and admitted to [facility] on 4/18/25.

## 2025-04-18 NOTE — H&P ADULT - NSHPLABSRESULTS_GEN_ALL_CORE
LABS:                          13.6   8.87  )-----------( 336      ( 16 Apr 2025 16:22 )             40.0     04-18    140  |  114[H]  |  16  ----------------------------<  108[H]  4.3   |  21[L]  |  0.58    Ca    8.7      18 Apr 2025 07:19    TPro  7.1  /  Alb  3.3  /  TBili  1.7[H]  /  DBili  x   /  AST  44[H]  /  ALT  23  /  AlkPhos  111  04-16    LIVER FUNCTIONS - ( 16 Apr 2025 16:22 )  Alb: 3.3 g/dL / Pro: 7.1 gm/dL / ALK PHOS: 111 U/L / ALT: 23 U/L / AST: 44 U/L / GGT: x           PT/INR - ( 16 Apr 2025 16:22 )   PT: 12.4 sec;   INR: 1.08 ratio         PTT - ( 16 Apr 2025 16:22 )  PTT:29.2 sec        CT HEAD:  High right parietal encephalomalacia and gliosis, likely related to prior   infarct in this location. Age-indeterminate ischemic event on the left at   the level of the corona radiata. Old left basal ganglia lacunar infarct.   Age-indeterminate left thalamic lacunar infarct. No acute intracranial   hemorrhage.    Findings were discussed with MOON BAKER [phone] 4/16/2025 5:24   PM by Dr. Behr Ventura with read back confirmation.      CTA NECK:  1. Deposition of calcified plaque along the proximal aspect of the   bilateral internal carotid arteries without significant stenosis or   occlusion.  2. Bilateral vertebral arteries are patent. Left vertebral artery is   dominant.    CTA HEAD:  1. No large vessel occlusion.  2. Deposition of calcified plaque with mild stenosis involving the   cavernous and supraclinoid bilateral internal carotid arteries..  3. No aneurysm identified. Tiny aneurysms can be beyond the resolution of   CTA technique.        < from: MR Head No Cont (04.17.25 @ 10:13) >    IMPRESSION:    1.  Acute infarct in the left corona radiata.  2.  Chronic ischemic changes as discussed above.      < from: TTE W or WO Ultrasound Enhancing Agent (04.17.25 @ 07:33) >  CONCLUSIONS:      1. Left ventricular cavity is normal in size. Left ventricular wall thickness is normal. Left ventricular systolic function is normal with an ejection fraction of 68 % by Chandler's method of disks.  2. Normal right ventricular cavity size and normal right ventricular systolic function.   3. Trace mitral regurgitation.   4. Mild tricuspid regurgitation.   5. Estimated pulmonary artery systolic pressure is 19 mmHg.   6. Trace aortic regurgitation.

## 2025-04-18 NOTE — H&P ADULT - ASSESSMENT
ASSESSMENT/PLAN  74 year old female with PMH of  COPD, pneumothorax, collapsed lung, and TIAs who presented to the ED at [place] on 4/16 complaining of severe right arm weakness. Per patient she woke up on  2 days prior to admit  with difficulty moving and lifting right arm. NIHSS was 5 in ED.  CTH showed high R parietal encephalomalacia and gliosis related likely to prior infarct, age indeterminate L CR, old BG L lacunar infarct, L thalamic lacunar infarct.  She was not a candidate for IV thrombolytics because she was out of the window. CTA was neg for LVO.  MRI brain this morning reveals acute L infarct in the corona radiate. She was placed on DAPT. Course complicated by elevated LFTs with workup unrevealing. She was evaluated for admission to acute inpatient rehab and admitted to [facility] on 4/18/25.       #Left corona radiata infarct now with dysarthria, right sided weakness, Gait Instability, ADL impairments and Functional impairments  - Start Comprehensive Rehab Program of PT/OT/SLP  -Continue DAPT x 21 days and then aspirin 81mg daily  -Continue statin     #COPD  -Continue albuterol PRN  -Home med: trelegy daily - can restart on discharge - therapeutic interchange to Advair plus Spiriva while admitted     #Pain control  - Tylenol PRN    #GI/Bowel Mgmt   - Continue Senna at bedtime   - Miralax daily     #Bladder management  - Continue to monitor PVR x1   -Monitor UO    #DVT prophylaxis   - Lovenox  - TEDs     #Skin:  -***    FEN   - Diet - Regular with thins    - Dysphagia  SLP - evaluation and treatment    Precautions / PROPHYLAXIS:   - Falls  - ortho: Weight bearing status: WBAT   - Lungs: Aspiration  - Pressure injury/Skin: OOB to Chair, PT/OT        Gela Carrillo  Neurology  [address], [address]  [place] [address]  Phone: [phone]  Fax: [phone]  Follow Up Time: 1 week    Mirlande Torres  [facility]  PULMMED [address]  Scheduled Appointment: 06/25/2025    MEDICAL PROGNOSIS: GOOD              REHAB POTENTIAL: GOOD              ESTIMATED DISPOSITION: HOME WITH HOME CARE              ELOS: 10-14 Days   EXPECTED THERAPY:     P.T. 1hr/day       O.T. 1hr/day      S.L.P. 1hr/day       P&O Unnecessary       EXP FREQUENCY: 5 days per 7 day period     PRESCREEN COMPARISON:   I have reviewed the prescreen information and I have found no relevant changes between the preadmission screening and my post admission evaluation     RATIONALE FOR INPATIENT ADMISSION - Patient demonstrates the following: (check all that apply)  [X] Medically appropriate for rehabilitation admission  [X] Has attainable rehab goals with an appropriate initial discharge plan  [X] Has rehabilitation potential (expected to make a significant improvement within a reasonable period of time)   [X] Requires close medical management by a rehab physician, rehab nursing care, Hospitalist and comprehensive interdisciplinary team (including PT, OT, & or SLP, Prosthetics and Orthotics)   ASSESSMENT/PLAN  74 year old female with PMH of  COPD, pneumothorax, collapsed lung, and TIAs who presented to the ED at [place] on 4/16 complaining of severe right arm weakness. Per patient she woke up on  2 days prior to admit  with difficulty moving and lifting right arm. NIHSS was 5 in ED.  CTH showed high R parietal encephalomalacia and gliosis related likely to prior infarct, age indeterminate L CR, old BG L lacunar infarct, L thalamic lacunar infarct.  She was not a candidate for IV thrombolytics because she was out of the window. CTA was neg for LVO.  MRI brain this morning reveals acute L infarct in the corona radiate. She was placed on DAPT. Course complicated by elevated LFTs with workup unrevealing. She was evaluated for admission to acute inpatient rehab and admitted to [facility] on 4/18/25.       #Left corona radiata infarct now with dysarthria, right sided weakness, Gait Instability, ADL impairments and Functional impairments  - Start Comprehensive Rehab Program of PT/OT/SLP  -Continue DAPT x 21 days and then aspirin 81mg daily  -Continue statin     #HTN  -Losartan 25mg daily     #COPD  -Continue albuterol PRN  -Home med: trelegy daily - can restart on discharge - therapeutic interchange to Advair plus Spiriva while admitted     #Pain control  - Tylenol PRN    #GI/Bowel Mgmt   - Continue Senna at bedtime   - Miralax daily     #Bladder management  - Continue to monitor PVR x1   -Monitor UO    #DVT prophylaxis   - Lovenox  - TEDs     #Skin:  -***    FEN   - Diet - Regular with thins    - Dysphagia  SLP - evaluation and treatment    Precautions / PROPHYLAXIS:   - Falls  - ortho: Weight bearing status: WBAT   - Lungs: Aspiration  - Pressure injury/Skin: OOB to Chair, PT/OT        Gela Carrillo  Neurology  [address] [facility], [address]  [place] [address]  Phone: [phone]  Fax: [phone]  Follow Up Time: 1 week    [facility]  PULED [address]  Scheduled Appointment: 06/25/2025    MEDICAL PROGNOSIS: GOOD              REHAB POTENTIAL: GOOD              ESTIMATED DISPOSITION: HOME WITH HOME CARE              ELOS: 10-14 Days   EXPECTED THERAPY:     P.T. 1hr/day       O.T. 1hr/day      S.L.P. 1hr/day       P&O Unnecessary       EXP FREQUENCY: 5 days per 7 day period     PRESCREEN COMPARISON:   I have reviewed the prescreen information and I have found no relevant changes between the preadmission screening and my post admission evaluation     RATIONALE FOR INPATIENT ADMISSION - Patient demonstrates the following: (check all that apply)  [X] Medically appropriate for rehabilitation admission  [X] Has attainable rehab goals with an appropriate initial discharge plan  [X] Has rehabilitation potential (expected to make a significant improvement within a reasonable period of time)   [X] Requires close medical management by a rehab physician, rehab nursing care, Hospitalist and comprehensive interdisciplinary team (including PT, OT, & or SLP, Prosthetics and Orthotics)   ASSESSMENT/PLAN  74 year old female with PMH of  COPD, pneumothorax, collapsed lung, and TIAs who presented to the ED at [place] on 4/16 complaining of severe right arm weakness. Per patient she woke up on  2 days prior to admit  with difficulty moving and lifting right arm. NIHSS was 5 in ED.  CTH showed high R parietal encephalomalacia and gliosis related likely to prior infarct, age indeterminate L CR, old BG L lacunar infarct, L thalamic lacunar infarct.  She was not a candidate for IV thrombolytics because she was out of the window. CTA was neg for LVO.  MRI brain this morning reveals acute L infarct in the corona radiate. She was placed on DAPT. Course complicated by elevated LFTs with workup unrevealing. She was evaluated for admission to acute inpatient rehab and admitted to [facility] on 4/18/25.       #Left corona radiata infarct now with dysarthria, right sided weakness, Gait Instability, ADL impairments and Functional impairments  - Start Comprehensive Rehab Program of PT/OT/SLP  -Continue DAPT x 21 days and then aspirin 81mg daily  -Continue statin     #HTN  -Losartan 25mg daily     #COPD  -Continue albuterol PRN  -Home med: trelegy daily - can restart on discharge - therapeutic interchange to Advair plus Spiriva while admitted     #Pain control  - Tylenol PRN    #GI/Bowel Mgmt   - Continue Senna at bedtime   - Miralax daily     #Bladder management  - Continue to monitor PVR x1   -Monitor UO    #DVT prophylaxis   - Lovenox  - TEDs     #Skin:  - b/l blanchable redness heels, BUE ecchymosis, 6.5cm gluteal fissure     FEN   - Diet - Regular with thins    - Dysphagia  SLP - evaluation and treatment    Precautions / PROPHYLAXIS:   - Falls  - ortho: Weight bearing status: WBAT   - Lungs: Aspiration  - Pressure injury/Skin: OOB to Chair, PT/OT        Gela Carrillo  Neurology  [address], [address]  [place] [address]  Phone: [phone]  Fax: [phone]  Follow Up Time: 1 week    Mirlande Torres  [facility]  PULED [address]  Scheduled Appointment: 06/25/2025    MEDICAL PROGNOSIS: GOOD              REHAB POTENTIAL: GOOD              ESTIMATED DISPOSITION: HOME WITH HOME CARE              ELOS: 10-14 Days   EXPECTED THERAPY:     P.T. 1hr/day       O.T. 1hr/day      S.L.P. 1hr/day       P&O Unnecessary       EXP FREQUENCY: 5 days per 7 day period     PRESCREEN COMPARISON:   I have reviewed the prescreen information and I have found no relevant changes between the preadmission screening and my post admission evaluation     RATIONALE FOR INPATIENT ADMISSION - Patient demonstrates the following: (check all that apply)  [X] Medically appropriate for rehabilitation admission  [X] Has attainable rehab goals with an appropriate initial discharge plan  [X] Has rehabilitation potential (expected to make a significant improvement within a reasonable period of time)   [X] Requires close medical management by a rehab physician, rehab nursing care, Hospitalist and comprehensive interdisciplinary team (including PT, OT, & or SLP, Prosthetics and Orthotics)   ASSESSMENT/PLAN  74 year old female with PMH of  COPD, pneumothorax, collapsed lung, and TIAs who presented to the ED at [place] on 4/16 complaining of severe right arm weakness. Per patient she woke up on  2 days prior to admit  with difficulty moving and lifting right arm. NIHSS was 5 in ED.  CTH showed high R parietal encephalomalacia and gliosis related likely to prior infarct, age indeterminate L CR, old BG L lacunar infarct, L thalamic lacunar infarct.  She was not a candidate for IV thrombolytics because she was out of the window. CTA was neg for LVO.  MRI brain this morning reveals acute L infarct in the corona radiate. She was placed on DAPT. Course complicated by elevated LFTs with workup unrevealing. She was evaluated for admission to acute inpatient rehab and admitted to [facility] on 4/18/25.       #Left Thalamic infarct now with dysarthria, right sided weakness, Gait Instability, ADL impairments and Functional impairments  #S/P Fall   -Comprehensive Rehab Program of PT/OT/SLP  -Continue DAPT x 21 days and then aspirin 81mg daily  -Continue statin  -Oral hygiene x 3 daily  -Right wrist and hand x ray (04/19)   - Lipid profile (04/19) 221/118/43/156  - A1C (04/19) 5.9     #Leukocytosis:  - WBC (04/19) 10.87  - No fever  - Check UA  - Monitor     #Agitation   - EKG  - Seroquel 12.5 at 8 pm        #HTN  -Losartan 25mg daily     #COPD  -Continue albuterol PRN  -Home med: trelegy daily - can restart on discharge - therapeutic interchange to Advair plus Spiriva while admitted     #Pain control  - Tylenol PRN    #GI/Bowel Mgmt   -  Senna at bedtime   - Miralax daily     #Bladder management  - Continue to monitor PVR x1   - Monitor UO  - Scheduled voiding time q 4 hours while awake     #DVT prophylaxis   - Lovenox  - TEDs     #Skin:  - b/l blanchable redness heels, BUE ecchymosis, 6.5cm gluteal fissure , stage I pressure injury to gluteal cleft    FEN   - Diet - Regular with thins    - Dysphagia  SLP - evaluation and treatment    Precautions / PROPHYLAXIS:   - Falls  - ortho: Weight bearing status: WBAT   - Lungs: Aspiration  - Pressure injury/Skin: OOB to Chair, PT/OT        Shareeff, Musarat  Neurology  [address] [place] [address], [address]  [place] [address]  Phone: [phone]  Fax: [phone]  Follow Up Time: 1 week    Mirlande Torres  [facility]  Scheduled Appointment: 06/25/2025    MEDICAL PROGNOSIS: GOOD              REHAB POTENTIAL: GOOD              ESTIMATED DISPOSITION: HOME WITH HOME CARE              ELOS: 10-14 Days   EXPECTED THERAPY:     P.T. 1hr/day       O.T. 1hr/day      S.L.P. 1hr/day       P&O Unnecessary       EXP FREQUENCY: 5 days per 7 day period     PRESCREEN COMPARISON:   I have reviewed the prescreen information and I have found no relevant changes between the preadmission screening and my post admission evaluation     RATIONALE FOR INPATIENT ADMISSION - Patient demonstrates the following: (check all that apply)  [X] Medically appropriate for rehabilitation admission  [X] Has attainable rehab goals with an appropriate initial discharge plan  [X] Has rehabilitation potential (expected to make a significant improvement within a reasonable period of time)   [X] Requires close medical management by a rehab physician, rehab nursing care, Hospitalist and comprehensive interdisciplinary team (including PT, OT, & or SLP, Prosthetics and Orthotics)

## 2025-04-18 NOTE — H&P ADULT - NSHPADDITIONALINFOADULT_GEN_ALL_CORE
Saw and evaluated the patient, reviewed medical records, took history, examined, started an assessment and management plan. Spent 75 minutes

## 2025-04-18 NOTE — DISCHARGE NOTE PROVIDER - NSDCMRMEDTOKEN_GEN_ALL_CORE_FT
aspirin 81 mg oral tablet, chewable: 1 tab(s) orally once a day  atorvastatin 80 mg oral tablet: 1 tab(s) orally once a day (at bedtime)  clopidogrel 75 mg oral tablet: 1 tab(s) orally once a day  ipratropium-albuterol 0.5 mg-2.5 mg/3 mL inhalation solution: 3 milliliter(s) inhaled every 6 hours as needed for Shortness of Breath and/or Wheezing  losartan 25 mg oral tablet: 1 tab(s) orally once a day  Trelegy Ellipta 200 mcg-62.5 mcg-25 mcg/inh inhalation powder: 1 puff(s) inhaled once a day

## 2025-04-18 NOTE — DISCHARGE NOTE PROVIDER - HOSPITAL COURSE
PHYSICAL EXAM:    Daily     Daily Weight in k.8 (2025 05:17)    Vital Signs Last 24 Hrs  T(C): 36.8 (2025 08:08), Max: 36.8 (2025 08:08)  T(F): 98.2 (2025 08:08), Max: 98.2 (2025 08:08)  HR: 80 (2025 09:45) (68 - 89)  BP: 147/77 (2025 08:08) (125/61 - 148/82)  BP(mean): --  RR: 18 (2025 08:08) (18 - 18)  SpO2: 98% (2025 09:45) (91% - 98%)    Constitutional: Weak  appearing  HEENT: Atraumatic, THADDEUS,   Respiratory: Breath Sounds normal, no rhonchi/wheeze  Cardiovascular: N S1S2;   Gastrointestinal: Abdomen soft, non tender, Bowel Sounds present  Extremities: No edema, peripheral pulses present  Neurological: AAO x 3, right arm 1/5; right leg 1/5; dysarthria +  Skin: Non cellulitic, no rash, ulcers  Lymph Nodes: No lymphadenopathy noted  Back: No CVA tenderness   Musculoskeletal: non tender  Breasts: Deferred  Genitourinary: deferred  Rectal: Deferred    74y Female with significant PMH of COPD, pneumothorax, collapsed lung, and TIAs,  presented to the ED with sister complaining of severe right arm weakness.  Per patient she woke up on Monday ( 2 days ago) with difficulty moving and lifting right arm.    Pt admitted with      # RUE/Right leg weakness  with right facial asymmetry + dysarthria d/t acute CVA: Acute infarct in the left corona radiata.  -CT head shows age-indeterminate ischemic event on the left at the level of the corona radiata, and high right parietal encephalomalacia and gliosis with old left basal ganglia lacunar infarct likely related to prior infarct.  -Admit to telemetry.  -Has passed bedside swallow study.  -Vitals and neuro-checks q4h.  -Lipid panel and A1c level.  -MRI brain and 2D Echo noted  -ASA, plavix  and Atorvastatin started. DAPT x 21 days and then ASA 81 mg daily.  -PT/OT.  -appreciate neurology consult.  Physiatry consult  acute rehab     # Abnormal LFTs with elevated T. codie and AST.  -T. Bili 1.70 ans AST 44.  -Will get acute hepatitis panel.    # COPD.  -Not in acute exacerbation.  -C/w her home inhalers.    d/c to acute rehab    time spent 35 min

## 2025-04-18 NOTE — PATIENT PROFILE ADULT - ARE SIGNIFICANT INDICATORS COMPLETE.
Assessment & Plan     Skin tag  Right upper leg    Actinic keratosis  Right upper chest    25 minutes spent by me on the date of the encounter doing chart review, history and exam, documentation and further activities per the note     Patient Instructions   Contact if fever, bleeding, painful wounds.    Followup within 6 weeks if healing not complete; will need to biopsy chest lesion if not gone.        Rashawn Valencia MD  [facility] DMITRY Ontiveros is a 66 year old, presenting for the following health issues:  Skin tag removal and Mole      9/11/2023    10:19 AM   Additional Questions   Roomed by Denise Wu   Accompanied by N/A       HPI - would like skin tag removed from right thigh. Also noted skin rash/lesion right upper chest he'd like looked at    Rash  Onset/Duration: this past year   Description  Location: left upper chest  Character: round, red  Itching: no  Progression of Symptoms:  may have grown  Accompanying signs and symptoms:   Fever: No  Body aches or joint pain: No  Sore throat symptoms: No  Recent cold symptoms: No  History:           Previous episodes of similar rash: None  New exposures:  None  Recent travel: No  Exposure to similar rash: No  Precipitating or alleviating factors: none  Therapies tried and outcome: none    Skin Lesion  Onset/Duration: years   Description  Location: right upper inner thigh below groin  Color: skin colored  Border description: classic small skin tags  Character: blanches to palpitation  Itching: no  Bleeding:  No  Intensity:  moderate  Progression of Symptoms:  constant  Accompanying signs and symptoms:   Bleeding: No  Scaling: No  Excessive sun exposure/tanning: No  Sunscreen used: N/A  History:           Any previous history of skin cancer: No  Any family history of melanoma: No  Previous episodes of similar lesion: No  Precipitating or alleviating factors: none  Therapies tried and outcome: none      Review of Systems  "  Constitutional, HEENT, cardiovascular, pulmonary, gi and gu systems are negative, except as otherwise noted.      Objective    /83 (BP Location: Left arm, Patient Position: Sitting, Cuff Size: Adult Large)   Pulse 67   Temp 97.5  F (36.4  C) (Temporal)   Resp 15   Ht 1.839 m (6' 0.4\")   Wt 121.2 kg (267 lb 4.8 oz)   SpO2 98%   BMI 35.85 kg/m    Body mass index is 35.85 kg/m .  Physical Exam   GENERAL: healthy, alert and no distress  RESP: lungs clear to auscultation - no rales, rhonchi or wheezes  CV: regular rate and rhythm, normal S1 S2, no S3 or S4, no murmur, click or rub, no peripheral edema and peripheral pulses strong  MS: no gross musculoskeletal defects noted, no edema  SKIN: #1 maculopapular eruption - and upper chest  1 cm red, slightly raised confluent slightly irreg border, and   #2 1 cm skin tag upper inner right thigh below inguinal area    PROCEDURE #1 CRYOCAUTERY    Description: lesion was frozen 10 sec x 3 without complication    PROCEDURE #2 SKIN TAG REMOVAL    Description: skin tag narrow stalk was prepped in usual manner with betadine, 0.5 ml 1% xylo with epi instilled subcutaneously with good anesthesia. An iris was used to divide the skin tag from the thigh without blood loss. Base was hyfercated, no tissue was sent to path.              " Yes

## 2025-04-18 NOTE — H&P ADULT - NSHPREVIEWOFSYSTEMS_GEN_ALL_CORE
REVIEW OF SYSTEMS  Constitutional: No fever, No Chills, No fatigue  HEENT: No eye pain, No visual disturbances, No difficulty hearing  Pulm: No cough,  No shortness of breath  Cardio: No chest pain, No palpitations  GI:  No abdominal pain, No nausea, No vomiting, No diarrhea, No constipation  : No dysuria, No frequency, No hematuria  Neuro: No headaches, +forgetful, + loss of strength R side, No numbness, No tremors  Skin: No itching, No rashes, No lesions   Endo: No temperature intolerance  MSK: No joint pain, No joint swelling, No muscle pain, No Neck pain,  No back pain  Psych:  No depression, No anxiety REVIEW OF SYSTEMS  Constitutional: No fever, No Chills, No fatigue  HEENT: No eye pain, No visual disturbances, No difficulty hearing  Pulm: No cough,  No shortness of breath  Cardio: No chest pain, No palpitations  GI:  No abdominal pain, No nausea, No vomiting, No diarrhea, No constipation  : No dysuria, No frequency, No hematuria  Neuro: No headaches, (+) forgetful, (+) loss of strength R side, No numbness, No tremors  Skin: No itching, No rashes, No lesions   Endo: No temperature intolerance  MSK: No joint pain, No joint swelling, No muscle pain, No Neck pain,  No back pain  Psych:  No depression, No anxiety

## 2025-04-18 NOTE — PATIENT PROFILE ADULT - FALL HARM RISK - FACTORS
Render Risk Assessment In Note?: no Additional Notes: Dr. Murphy’s information was given to patient. Detail Level: Simple Impaired gait/Other medical problems/Weakness

## 2025-04-18 NOTE — DISCHARGE NOTE PROVIDER - CARE PROVIDER_API CALL
PCP,   Phone: (   )    -  Fax: (   )    -  Follow Up Time: 1-3 days    Gela Carrillo  Neurology  [address] [place] [address], [address] [place] [address]  Phone: [phone]  Fax: [phone]  Follow Up Time: 1 week

## 2025-04-18 NOTE — DISCHARGE NOTE PROVIDER - CARE PROVIDERS DIRECT ADDRESSES
,[email or web address]

## 2025-04-18 NOTE — DISCHARGE NOTE PROVIDER - NSDCFUSCHEDAPPT_GEN_ALL_CORE_FT
Mirlande Torres  [facility]  PULED [address]  Scheduled Appointment: 06/25/2025

## 2025-04-18 NOTE — PATIENT PROFILE ADULT - FLU SEASON?
0922 Bedside and verbal shift change report given to Girtha Siemens RN by Francisco Hung RN. Report given with use of SBAR, MAR, I/O, and Recent Results. This RN assumed care of pt at this time. Assessment complete upon assuming care, see flowsheets. Pt denies headache, visual disturbances, ringing in the ears, SOB, chest pain, and/or shooting pain in calves. Pt educated on previously stated signs and symptoms to report, plan of care for the day, proper benson care, pain management; infant feeding, safety, and I/O log sheet- pt verbalized understanding. Opportunity for questions offered, pt denies questions. This RN rounding Q2 hours. Needs and concerns addressed. [id number] Discharge education and interventions complete by LISA Seymour RN. Pt discharged to room awaiting transport. Yes...

## 2025-04-18 NOTE — PATIENT PROFILE ADULT - FALL HARM RISK - RISK INTERVENTIONS
Assistance OOB with selected safe patient handling equipment/Assistance with ambulation/Communicate Fall Risk and Risk Factors to all staff, patient, and family/Discuss with provider need for PT consult/Monitor gait and stability/Reinforce activity limits and safety measures with patient and family/Visual Cue: Yellow wristband/Bed in lowest position, wheels locked, appropriate side rails in place/Call bell, personal items and telephone in reach/Instruct patient to call for assistance before getting out of bed or chair/Non-slip footwear when patient is out of bed/[place] to call system/Physically safe environment - no spills, clutter or unnecessary equipment/Purposeful Proactive Rounding/Room/bathroom lighting operational, light cord in reach

## 2025-04-18 NOTE — DISCHARGE NOTE NURSING/CASE MANAGEMENT/SOCIAL WORK - FINANCIAL ASSISTANCE
[facility] provides services at a reduced cost to those who are determined to be eligible through [facility]’s financial assistance program. Information regarding [facility]’s financial assistance program can be found by going to [email or web address] or by calling [phone].

## 2025-04-18 NOTE — PATIENT PROFILE ADULT - FALL HARM RISK - CONCLUSION
Urology Progress Note    Chief Complaint: fever, flank pain    Subjective: \"    Patient is resting in bed, voiding spontaneosly, +flatus, +BM, ambulating with assistance, tolerating regular diet, denies any nausea or vomiting. There are complaints of no pain at this time. She denies any back or flank pain. Denies CP/SOB, fever/chills, having slightly loose stools, tolerating normal diet  Afebrile overnight    Urologic history:  S/p cystoscopy, right ureteral stent placement on  for proximal stone by Dr Luis Rondon. Findings of right hydronephrosis            Vitals:  BP (!) 165/85   Pulse 90   Temp 98.8 °F (37.1 °C) (Oral)   Resp 16   Wt 136 lb 9.6 oz (62 kg)   SpO2 95%   BMI 24.98 kg/m²   Temp  Av.7 °F (37.1 °C)  Min: 98 °F (36.7 °C)  Max: 99.2 °F (37.3 °C)    Intake/Output Summary (Last 24 hours) at 2020 0855  Last data filed at 2020 0459  Gross per 24 hour   Intake 966.89 ml   Output 1940 ml   Net -973.11 ml       Social History     Socioeconomic History    Marital status:      Spouse name: Not on file    Number of children: Not on file    Years of education: Not on file    Highest education level: Not on file   Occupational History    Not on file   Social Needs    Financial resource strain: Not on file    Food insecurity     Worry: Not on file     Inability: Not on file   [employer] needs     Medical: Not on file     Non-medical: Not on file   Tobacco Use    Smoking status: Former Smoker     Packs/day: 0.50     Years: 6.00     Pack years: 3.00     Last attempt to quit: 1973     Years since quittin.3    Smokeless tobacco: Never Used   Substance and Sexual Activity    Alcohol use: Yes     Comment: OCC.     Drug use: No    Sexual activity: Not on file   Lifestyle    Physical activity     Days per week: Not on file     Minutes per session: Not on file    Stress: Not on file   Relationships    Social connections     Talks on phone: Not on file     Gets Fall Risk

## 2025-04-18 NOTE — PATIENT PROFILE ADULT - NSPRONUTRITIONRISK_GEN_A_NUR
Regular rate and rhythm, Heart sounds S1 S2 present, no murmurs, rubs or gallops No indicators present

## 2025-04-18 NOTE — PROGRESS NOTE ADULT - SUBJECTIVE AND OBJECTIVE BOX
Patient sitting in bed, speech is mildly dysarthric, continues to have profound weakness left upper extremity and slight weakness left lower extremity.    , HDL 43  A1c 5.9    SYLWIA done    ROS as above, other ROS negative       MEDICATIONS  (STANDING):  aspirin  chewable 81 milliGRAM(s) Oral daily  atorvastatin 80 milliGRAM(s) Oral at bedtime  clopidogrel Tablet 75 milliGRAM(s) Oral daily  enoxaparin Injectable 40 milliGRAM(s) SubCutaneous every 24 hours  fluticasone furoate/umeclidinium/vilanterol 200-62.5-25 MICROgram(s) Inhaler 1 Puff(s) Inhalation daily      Vital Signs Last 24 Hrs  T(C): 36.8 (18 Apr 2025 08:08), Max: 36.8 (18 Apr 2025 08:08)  T(F): 98.2 (18 Apr 2025 08:08), Max: 98.2 (18 Apr 2025 08:08)  HR: 80 (18 Apr 2025 09:45) (68 - 89)  BP: 147/77 (18 Apr 2025 08:08) (125/61 - 148/82)  BP(mean): --  RR: 18 (18 Apr 2025 08:08) (18 - 18)  SpO2: 98% (18 Apr 2025 09:45) (91% - 98%)    Parameters below as of 18 Apr 2025 09:45  Patient On (Oxygen Delivery Method): room air      Neurological exam:  HF: A x O x 3. Appropriately interactive, normal affect.  mild dysarthria no Aphasia or paraphasic errors. Naming /repetition intact   CN: AYLIN, EOMI, VFF, facial sensation normal, R NLFD, tongue midline, Palate moves equally, mild dysarthria 2/2 dentures  Motor: Dense RUE plegia, RLE 4/5, 5/5 L side, normal bulk and tone, no tremors   Sens: Intact to light touch    Reflexes: Symmetric and normal, downgoing toes b/l  Coord:  HTS intact b/L, RUE plegia, nml L FTN  Gait/Balance: Cannot test    NIHSS:  5                                13.6   8.87  )-----------( 336      ( 16 Apr 2025 16:22 )             40.0     04-18    140  |  114[H]  |  16  ----------------------------<  108[H]  4.3   |  21[L]  |  0.58    Ca    8.7      18 Apr 2025 07:19    TPro  7.1  /  Alb  3.3  /  TBili  1.7[H]  /  DBili  x   /  AST  44[H]  /  ALT  23  /  AlkPhos  111  04-16 04-17 Chol 221[H] [id number] HDL 43[L] Trig 118    Radiology report:  < from: MR Head No Cont (04.17.25 @ 10:13) >  IMPRESSION:    1.  Acute infarct in the left corona radiata.  2.  Chronic ischemic changes as discussed above.    < from: TTE W or WO Ultrasound Enhancing Agent (04.17.25 @ 07:33) >  CONCLUSIONS:      1. Left ventricular cavity is normal in size. Left ventricular wall thickness is normal. Left ventricular systolic function is normal with an ejection fraction of 68 % by Chandler's method of disks.  2. Normal right ventricular cavity size and normal right ventricular systolic function.   3. Trace mitral regurgitation.   4. Mild tricuspid regurgitation.   5. Estimated pulmonary artery systolic pressure is 19 mmHg.   6. Trace aortic regurgitation.    ________________________________________________________________________________________  IMPRESSION:    CT HEAD:  High right parietal encephalomalacia and gliosis, likely related to prior   infarct in this location. Age-indeterminate ischemic event on the left at   the level of the corona radiata. Old left basal ganglia lacunar infarct.   Age-indeterminate left thalamic lacunar infarct. No acute intracranial   hemorrhage.    Findings were discussed with MOON BAKER [id number] 4/16/2025 5:24   PM by Dr. Behr Ventura with read back confirmation.      CTA NECK:  1. Deposition of calcified plaque along the proximal aspect of the   bilateral internal carotid arteries without significant stenosis or   occlusion.  2. Bilateral vertebral arteries are patent. Left vertebral artery is   dominant.    CTA HEAD:  1. Nolarge vessel occlusion.  2. Deposition of calcified plaque with mild stenosis involving the   cavernous and supraclinoid bilateral internal carotid arteries..  3. No aneurysm identified. Tiny aneurysms can be beyond the resolution of   CTA technique.      
4/17: c/o right arm and leg weakness, severe  pt is right handed      PHYSICAL EXAM:    Daily     Daily     Vital Signs Last 24 Hrs  T(C): 36.7 (17 Apr 2025 14:21), Max: 37.1 (16 Apr 2025 20:54)  T(F): 98.1 (17 Apr 2025 14:21), Max: 98.8 (16 Apr 2025 20:54)  HR: 73 (17 Apr 2025 14:21) (72 - 93)  BP: 148/82 (17 Apr 2025 14:21) (117/76 - 154/77)  BP(mean): 102 (16 Apr 2025 19:51) (99 - 111)  RR: 18 (17 Apr 2025 14:21) (17 - 18)  SpO2: 93% (17 Apr 2025 14:21) (92% - 100%)    Constitutional: Weak and ill appearing  HEENT: Atraumatic, THADDEUS,   Respiratory: Breath Sounds normal, no rhonchi/wheeze  Cardiovascular: N S1S2;   Gastrointestinal: Abdomen soft, non tender, Bowel Sounds present  Extremities: No edema, peripheral pulses present  Neurological: AAO x 3, right arm 1/5; right leg 1/5; dysarthria +  Skin: Non cellulitic, no rash, ulcers  Lymph Nodes: No lymphadenopathy noted  Back: No CVA tenderness   Musculoskeletal: non tender  Breasts: Deferred  Genitourinary: deferred  Rectal: Deferred    All Labs/EKG/Radiology/Meds reviewed by me                          13.6   8.87  )-----------( 336      ( 16 Apr 2025 16:22 )             40.0       CBC Full  -  ( 16 Apr 2025 16:22 )  WBC Count : 8.87 K/uL  RBC Count : 4.57 M/uL  Hemoglobin : 13.6 g/dL  Hematocrit : 40.0 %  Platelet Count - Automated : 336 K/uL  Mean Cell Volume : 87.5 fl  Mean Cell Hemoglobin : 29.8 pg  Mean Cell Hemoglobin Concentration : 34.0 g/dL  Auto Neutrophil # : 6.88 K/uL  Auto Lymphocyte # : 0.98 K/uL  Auto Monocyte # : 0.91 K/uL  Auto Eosinophil # : 0.02 K/uL  Auto Basophil # : 0.06 K/uL  Auto Neutrophil % : 77.6 %  Auto Lymphocyte % : 11.0 %  Auto Monocyte % : 10.3 %  Auto Eosinophil % : 0.2 %  Auto Basophil % : 0.7 %      04-17    136  |  107  |  18  ----------------------------<  104[H]  3.2[L]   |  21[L]  |  0.65    Ca    8.9      17 Apr 2025 07:27    TPro  7.1  /  Alb  3.3  /  TBili  1.7[H]  /  DBili  x   /  AST  44[H]  /  ALT  23  /  AlkPhos  111  04-16      LIVER FUNCTIONS - ( 16 Apr 2025 16:22 )  Alb: 3.3 g/dL / Pro: 7.1 gm/dL / ALK PHOS: 111 U/L / ALT: 23 U/L / AST: 44 U/L / GGT: x             PT/INR - ( 16 Apr 2025 16:22 )   PT: 12.4 sec;   INR: 1.08 ratio         PTT - ( 16 Apr 2025 16:22 )  PTT:29.2 sec          Urinalysis Basic - ( 17 Apr 2025 07:27 )    Color: x / Appearance: x / SG: x / pH: x  Gluc: 104 mg/dL / Ketone: x  / Bili: x / Urobili: x   Blood: x / Protein: x / Nitrite: x   Leuk Esterase: x / RBC: x / WBC x   Sq Epi: x / Non Sq Epi: x / Bacteria: x    < from: MR Head No Cont (04.17.25 @ 10:13) >  IMPRESSION:    1.  Acute infarct in the left corona radiata.  2.  Chronic ischemic changes as discussed above.      < end of copied text >      < from: TTE W or WO Ultrasound Enhancing Agent (04.17.25 @ 07:33) >  CONCLUSIONS:      1. Left ventricular cavity is normal in size. Left ventricular wall thickness is normal. Left ventricular systolic function is normal with an ejection fraction of 68 % by Chandler's method of disks.  2. Normal right ventricular cavity size and normal right ventricular systolic function.   3. Trace mitral regurgitation.   4. Mild tricuspid regurgitation.   5. Estimated pulmonary artery systolic pressure is 19 mmHg.   6. Trace aortic regurgitation.    < end of copied text >      MEDICATIONS  (STANDING):  aspirin  chewable 81 milliGRAM(s) Oral daily  atorvastatin 80 milliGRAM(s) Oral at bedtime  clopidogrel Tablet 75 milliGRAM(s) Oral daily  enoxaparin Injectable 40 milliGRAM(s) SubCutaneous every 24 hours  fluticasone propionate/ salmeterol 100-50 MICROgram(s) Diskus 1 Dose(s) Inhalation two times a day  potassium chloride    Tablet ER 40 milliEquivalent(s) Oral every 4 hours  tiotropium 2.5 MICROgram(s) Inhaler 2 Puff(s) Inhalation daily    MEDICATIONS  (PRN):  albuterol    90 MICROgram(s) HFA Inhaler 2 Puff(s) Inhalation every 6 hours PRN Shortness of Breath and/or Wheezing

## 2025-04-18 NOTE — DISCHARGE NOTE PROVIDER - PROVIDER TOKENS
FREE:[LAST:[PCP],PHONE:[(   )    -],FAX:[(   )    -],FOLLOWUP:[1-3 days]],PROVIDER:[TOKEN:[[id number]],FOLLOWUP:[1 week]]

## 2025-04-18 NOTE — PATIENT PROFILE ADULT - HEALTH LITERACY
"ED TRIAGE    Medical / Trauma C/o:  56-yr male patient - presenting to ED for eval of an insect-type bite to L-thigh and Right-great toe nail issue (Both not visualized at triage).    Duration of C/o:  Ongoing    Contributing Factors / Concerning HX:  See HX    Significant Med's / Tx's:  See med's    Febrile / Afebrile:  Afebrile    Patient Vitals for the past 24 hrs:   BP Temp Temp src Pulse Heart Rate Resp SpO2 Height Weight   02/02/19 1335 133/78 98  F (36.7  C) Oral 104 104 20 97 % 1.626 m (5' 4\") 77.1 kg (170 lb)       Parish Melo  February 2, 2019  1:38 PM  "
no

## 2025-04-18 NOTE — H&P ADULT - PATIENT'S GENDER IDENTITY
This encounter was opened in error. Please disregard.    Patient thought she was coming for therapy. Helped patient schedule a visit. This encounter is being closed.    Tiki Vasquez DO, CAQSM  [facility]    
ASSESSMENT & PLAN  There are no Patient Instructions on file for this visit.  -----      SUBJECTIVE:  Indy Reyes is a 89 year old female who is seen in follow-up for left wrist pain. They were last seen 10/29/2019.    They indicate that their current pain level is {PAIN SCALE:447079}.    The patient is seen {sjaparent:268531}.    Independent visualization of the below image:  ***    Procedures      Tiki Vasquez DO [facility] [place]      
Female

## 2025-04-18 NOTE — DISCHARGE NOTE PROVIDER - NSDCCPCAREPLAN_GEN_ALL_CORE_FT
PRINCIPAL DISCHARGE DIAGNOSIS  Diagnosis: Cerebrovascular accident (CVA)  Assessment and Plan of Treatment: take DAPT x 21 days; then aspirin 81 mg daily  take statin, losartan   f/u with PCP/neuro        SECONDARY DISCHARGE DIAGNOSES  Diagnosis: HLD (hyperlipidemia)  Assessment and Plan of Treatment: take statin daily    Diagnosis: Hyperglycemia  Assessment and Plan of Treatment: pre diabetic  A1c 5.9  diet and exercise

## 2025-04-19 LAB
ALBUMIN SERPL ELPH-MCNC: 3 G/DL — LOW (ref 3.3–5)
ALP SERPL-CCNC: 104 U/L — SIGNIFICANT CHANGE UP (ref 40–120)
ALT FLD-CCNC: 28 U/L — SIGNIFICANT CHANGE UP (ref 10–45)
ANION GAP SERPL CALC-SCNC: 9 MMOL/L — SIGNIFICANT CHANGE UP (ref 5–17)
AST SERPL-CCNC: 29 U/L — SIGNIFICANT CHANGE UP (ref 10–40)
BASOPHILS # BLD AUTO: 0.05 K/UL — SIGNIFICANT CHANGE UP (ref 0–0.2)
BASOPHILS NFR BLD AUTO: 0.5 % — SIGNIFICANT CHANGE UP (ref 0–2)
BILIRUB SERPL-MCNC: 0.7 MG/DL — SIGNIFICANT CHANGE UP (ref 0.2–1.2)
BUN SERPL-MCNC: 13 MG/DL — SIGNIFICANT CHANGE UP (ref 7–23)
CALCIUM SERPL-MCNC: 8.8 MG/DL — SIGNIFICANT CHANGE UP (ref 8.4–10.5)
CHLORIDE SERPL-SCNC: 106 MMOL/L — SIGNIFICANT CHANGE UP (ref 96–108)
CO2 SERPL-SCNC: 23 MMOL/L — SIGNIFICANT CHANGE UP (ref 22–31)
CREAT SERPL-MCNC: 0.64 MG/DL — SIGNIFICANT CHANGE UP (ref 0.5–1.3)
EGFR: 93 ML/MIN/1.73M2 — SIGNIFICANT CHANGE UP
EGFR: 93 ML/MIN/1.73M2 — SIGNIFICANT CHANGE UP
EOSINOPHIL # BLD AUTO: 0.08 K/UL — SIGNIFICANT CHANGE UP (ref 0–0.5)
EOSINOPHIL NFR BLD AUTO: 0.7 % — SIGNIFICANT CHANGE UP (ref 0–6)
GLUCOSE SERPL-MCNC: 116 MG/DL — HIGH (ref 70–99)
HCT VFR BLD CALC: 35.6 % — SIGNIFICANT CHANGE UP (ref 34.5–45)
HGB BLD-MCNC: 12 G/DL — SIGNIFICANT CHANGE UP (ref 11.5–15.5)
IMM GRANULOCYTES NFR BLD AUTO: 0.4 % — SIGNIFICANT CHANGE UP (ref 0–0.9)
LYMPHOCYTES # BLD AUTO: 0.77 K/UL — LOW (ref 1–3.3)
LYMPHOCYTES # BLD AUTO: 7.1 % — LOW (ref 13–44)
MCHC RBC-ENTMCNC: 30 PG — SIGNIFICANT CHANGE UP (ref 27–34)
MCHC RBC-ENTMCNC: 33.7 G/DL — SIGNIFICANT CHANGE UP (ref 32–36)
MCV RBC AUTO: 89 FL — SIGNIFICANT CHANGE UP (ref 80–100)
MONOCYTES # BLD AUTO: 0.92 K/UL — HIGH (ref 0–0.9)
MONOCYTES NFR BLD AUTO: 8.5 % — SIGNIFICANT CHANGE UP (ref 2–14)
NEUTROPHILS # BLD AUTO: 9.01 K/UL — HIGH (ref 1.8–7.4)
NEUTROPHILS NFR BLD AUTO: 82.8 % — HIGH (ref 43–77)
NRBC BLD AUTO-RTO: 0 /100 WBCS — SIGNIFICANT CHANGE UP (ref 0–0)
PLATELET # BLD AUTO: 276 K/UL — SIGNIFICANT CHANGE UP (ref 150–400)
POTASSIUM SERPL-MCNC: 3.7 MMOL/L — SIGNIFICANT CHANGE UP (ref 3.5–5.3)
POTASSIUM SERPL-SCNC: 3.7 MMOL/L — SIGNIFICANT CHANGE UP (ref 3.5–5.3)
PROT SERPL-MCNC: 6.4 G/DL — SIGNIFICANT CHANGE UP (ref 6–8.3)
RBC # BLD: 4 M/UL — SIGNIFICANT CHANGE UP (ref 3.8–5.2)
RBC # FLD: 13.1 % — SIGNIFICANT CHANGE UP (ref 10.3–14.5)
SODIUM SERPL-SCNC: 138 MMOL/L — SIGNIFICANT CHANGE UP (ref 135–145)
WBC # BLD: 10.87 K/UL — HIGH (ref 3.8–10.5)
WBC # FLD AUTO: 10.87 K/UL — HIGH (ref 3.8–10.5)

## 2025-04-19 PROCEDURE — 73130 X-RAY EXAM OF HAND: CPT | Mod: 26,RT

## 2025-04-19 PROCEDURE — 99223 1ST HOSP IP/OBS HIGH 75: CPT

## 2025-04-19 PROCEDURE — 99223 1ST HOSP IP/OBS HIGH 75: CPT | Mod: GC

## 2025-04-19 PROCEDURE — 73110 X-RAY EXAM OF WRIST: CPT | Mod: 26,RT

## 2025-04-19 PROCEDURE — 73090 X-RAY EXAM OF FOREARM: CPT | Mod: 26,LT

## 2025-04-19 RX ORDER — FLUTICASONE FUROATE, UMECLIDINIUM BROMIDE AND VILANTEROL TRIFENATATE 100; 62.5; 25 UG/1; UG/1; UG/1
1 POWDER RESPIRATORY (INHALATION) DAILY
Refills: 0 | Status: DISCONTINUED | OUTPATIENT
Start: 2025-04-20 | End: 2025-04-30

## 2025-04-19 RX ORDER — QUETIAPINE FUMARATE 25 MG/1
12.5 TABLET ORAL
Refills: 0 | Status: DISCONTINUED | OUTPATIENT
Start: 2025-04-19 | End: 2025-04-30

## 2025-04-19 RX ORDER — LIDOCAINE HYDROCHLORIDE 20 MG/ML
1 JELLY TOPICAL DAILY
Refills: 0 | Status: DISCONTINUED | OUTPATIENT
Start: 2025-04-19 | End: 2025-04-30

## 2025-04-19 RX ADMIN — Medication 650 MILLIGRAM(S): at 02:58

## 2025-04-19 RX ADMIN — CLOPIDOGREL BISULFATE 75 MILLIGRAM(S): 75 TABLET, FILM COATED ORAL at 12:29

## 2025-04-19 RX ADMIN — TIOTROPIUM BROMIDE INHALATION SPRAY 2 PUFF(S): 3.12 SPRAY, METERED RESPIRATORY (INHALATION) at 08:33

## 2025-04-19 RX ADMIN — Medication 650 MILLIGRAM(S): at 21:47

## 2025-04-19 RX ADMIN — ENOXAPARIN SODIUM 40 MILLIGRAM(S): 100 INJECTION SUBCUTANEOUS at 07:27

## 2025-04-19 RX ADMIN — LIDOCAINE HYDROCHLORIDE 1 PATCH: 20 JELLY TOPICAL at 03:11

## 2025-04-19 RX ADMIN — ATORVASTATIN CALCIUM 80 MILLIGRAM(S): 80 TABLET, FILM COATED ORAL at 21:46

## 2025-04-19 RX ADMIN — LOSARTAN POTASSIUM 25 MILLIGRAM(S): 100 TABLET, FILM COATED ORAL at 07:27

## 2025-04-19 RX ADMIN — Medication 81 MILLIGRAM(S): at 12:29

## 2025-04-19 RX ADMIN — Medication 1 DOSE(S): at 08:32

## 2025-04-19 RX ADMIN — Medication 2 PUFF(S): at 21:40

## 2025-04-19 RX ADMIN — Medication 2 TABLET(S): at 21:46

## 2025-04-19 RX ADMIN — QUETIAPINE FUMARATE 12.5 MILLIGRAM(S): 25 TABLET ORAL at 21:49

## 2025-04-19 NOTE — CONSULT NOTE ADULT - ASSESSMENT
74 year old female with PMH of  COPD, pneumothorax, collapsed lung, and TIAs who presented to the ED at [place] on 4/16 complaining of severe right arm weakness. Per patient she woke up on  2 days prior to admit  with difficulty moving and lifting right arm. NIHSS was 5 in ED.  CTH showed high R parietal encephalomalacia and gliosis related likely to prior infarct, age indeterminate L CR, old BG L lacunar infarct, L thalamic lacunar infarct.  She was not a candidate for IV thrombolytics because she was out of the window. CTA was neg for LVO.  MRI brain this morning reveals acute L infarct in the corona radiate. She was placed on DAPT. Course complicated by elevated LFTs with workup unrevealing. She was evaluated for admission to acute inpatient rehab and admitted to [facility] on 4/18/25.     #agitation  #RUE pain  -consider psych consultation  -possible delerium, elevated leukocytosis  -check UA (ordered)  -pain and limited motion in RUE likely from stroke, however given bruising, will check right arm/hand/wrist xrays    #Left corona radiata infarct now with dysarthria, right sided weakness, Gait Instability, ADL impairments and Functional impairments  - Comprehensive Rehab Program  -Continue DAPT x 21 days and then aspirin 81mg daily  -Continue statin     #HTN  -Losartan 25mg daily     #COPD  -Continue albuterol PRN  -Home med: trelegy daily  -Advair plus Spiriva while admitted   -resume Trelegy upon discharge    #Pain control  - Tylenol PRN    #GI/Bowel Mgmt   - Continue Senna at bedtime   - Miralax daily     #Bladder management  - Continue to monitor PVR x1   -Monitor UO    #DVT prophylaxis   - Lovenox

## 2025-04-19 NOTE — PROVIDER CONTACT NOTE (CHANGE IN STATUS NOTIFICATION) - SITUATION
Patient noted with change in mental status.  Received patient A&Ox4 at start of shift, but patient now A&Ox2, restless, irritable, and uncooperative.  Refusing therapy appointments.

## 2025-04-19 NOTE — PROVIDER CONTACT NOTE (CHANGE IN STATUS NOTIFICATION) - ACTION/TREATMENT ORDERED:
Providers made aware.  Patient moved to room closer to nurse's station, posey belt applied and functioning.  UA and C&S ordered, pending collection, patient refusing to go to bathroom at present.  Frequent rounding and reorientation performed, will attempt to collect labs during shift.

## 2025-04-19 NOTE — CONSULT NOTE ADULT - SUBJECTIVE AND OBJECTIVE BOX
HPI:   74 year old female with PMH of  COPD, pneumothorax, collapsed lung, and TIAs who presented to the ED at [place] on  complaining of severe right arm weakness. Per patient she woke up on  2 days prior to admission with difficulty moving and lifting right arm. NIHSS was 5 in ED.  CTH showed high R parietal encephalomalacia and gliosis related likely to prior infarct, age indeterminate L CR, old BG L lacunar infarct, L thalamic lacunar infarct. Out of window for thrombolytics. CTA was neg for LVO.  MRI brain this morning reveals acute L infarct in the corona radiate. She was placed on DAPT. Course complicated by elevated LFTs with workup unrevealing. She was evaluated for admission to acute inpatient rehab [facility]     PAST MEDICAL & SURGICAL HISTORY:  Emphysema/COPD      TIA (transient ischemic attack)      H/O  section          Review of Systems:   CONSTITUTIONAL: No fever, weight loss, or fatigue  EYES: No eye pain, visual disturbances, or discharge  ENMT:  No difficulty hearing, tinnitus, vertigo; No sinus or throat pain  NECK: No pain or stiffness  RESPIRATORY: No cough, wheezing, chills or hemoptysis; No shortness of breath  CARDIOVASCULAR: No chest pain, palpitations, dizziness, or leg swelling  GASTROINTESTINAL: No abdominal or epigastric pain. No nausea, vomiting, or hematemesis; No diarrhea or constipation. No melena or hematochezia.  GENITOURINARY: No dysuria, frequency, hematuria, or incontinence  NEUROLOGICAL: No headaches, memory loss, loss of strength, numbness, or tremors  SKIN: No itching, burning, rashes, or lesions   LYMPH NODES: No enlarged glands  ENDOCRINE: No heat or cold intolerance; No hair loss  MUSCULOSKELETAL: + right arm and hand pain  HEME/LYMPH: No easy bruising, or bleeding gums  ALLERY AND IMMUNOLOGIC: No hives or eczema    Allergies    No Known Allergies    Intolerances        Social History:     FAMILY HISTORY:      MEDICATIONS  (STANDING):  aspirin  chewable 81 milliGRAM(s) Oral daily  atorvastatin 80 milliGRAM(s) Oral at bedtime  clopidogrel Tablet 75 milliGRAM(s) Oral daily  enoxaparin Injectable 40 milliGRAM(s) SubCutaneous every 24 hours  fluticasone propionate/ salmeterol 100-50 MICROgram(s) Diskus 1 Dose(s) Inhalation two times a day  losartan 25 milliGRAM(s) Oral daily  polyethylene glycol 3350 17 Gram(s) Oral daily  senna 2 Tablet(s) Oral at bedtime  tiotropium 2.5 MICROgram(s) Inhaler 2 Puff(s) Inhalation daily    MEDICATIONS  (PRN):  acetaminophen     Tablet .. 650 milliGRAM(s) Oral every 6 hours PRN Mild Pain (1 - 3)  albuterol    90 MICROgram(s) HFA Inhaler 2 Puff(s) Inhalation every 6 hours PRN Shortness of Breath and/or Wheezing  lidocaine   4% Patch 1 Patch Transdermal daily PRN pain      Vital Signs Last 24 Hrs  T(C): 36.7 (2025 07:54), Max: 36.7 (2025 16:49)  T(F): 98.1 (2025 07:54), Max: 98.1 (2025 16:49)  HR: 79 (2025 07:54) (76 - 92)  BP: 123/82 (2025 07:54) (121/83 - 160/69)  BP(mean): --  RR: 16 (2025 07:54) (16 - 18)  SpO2: 94% (2025 07:54) (93% - 94%)    Parameters below as of 2025 07:54  Patient On (Oxygen Delivery Method): room air      CAPILLARY BLOOD GLUCOSE            PHYSICAL EXAM:  exam limited due to limited interest in participation (patient stated she wanted to sleep)  GENERAL: NAD, asleep but easily arousable  HEAD:  Atraumatic, Normocephalic  EYES: EOMI, conjunctiva and sclera clear  NECK: Supple, No JVD  CHEST/LUNG: Clear to auscultation bilaterally; No wheeze  HEART: Regular rate and rhythm; No murmurs, rubs, or gallops  ABDOMEN: Soft, Nontender, Nondistended; Bowel sounds present  EXTREMITIES:  2+ Peripheral Pulses, No clubbing, cyanosis, or edema  NEUROLOGY: right arm 2/5  SKIN: No rashes or lesions    LABS:                        12.0   10.87 )-----------( 276      ( 2025 06:46 )             35.6     04-19    138  |  106  |  13  ----------------------------<  116[H]  3.7   |  23  |  0.64    Ca    8.8      2025 06:46    TPro  6.4  /  Alb  3.0[L]  /  TBili  0.7  /  DBili  x   /  AST  29  /  ALT  28  /  AlkPhos  104  04-19          Urinalysis Basic - ( 2025 06:46 )    Color: x / Appearance: x / SG: x / pH: x  Gluc: 116 mg/dL / Ketone: x  / Bili: x / Urobili: x   Blood: x / Protein: x / Nitrite: x   Leuk Esterase: x / RBC: x / WBC x   Sq Epi: x / Non Sq Epi: x / Bacteria: x        EKG(personally reviewed):    RADIOLOGY & ADDITIONAL TESTS:    Imaging Personally Reviewed:    Consultant(s) Notes Reviewed:      Care Discussed with Consultants/Other Providers:

## 2025-04-20 LAB
APPEARANCE UR: CLEAR — SIGNIFICANT CHANGE UP
BACTERIA # UR AUTO: ABNORMAL /HPF
BILIRUB UR-MCNC: ABNORMAL
COLOR SPEC: SIGNIFICANT CHANGE UP
COMMENT - URINE: SIGNIFICANT CHANGE UP
DIFF PNL FLD: NEGATIVE — SIGNIFICANT CHANGE UP
EPI CELLS # UR: PRESENT
GLUCOSE UR QL: NEGATIVE MG/DL — SIGNIFICANT CHANGE UP
KETONES UR-MCNC: ABNORMAL MG/DL
LEUKOCYTE ESTERASE UR-ACNC: ABNORMAL
NITRITE UR-MCNC: NEGATIVE — SIGNIFICANT CHANGE UP
PH UR: 5.5 — SIGNIFICANT CHANGE UP (ref 5–8)
PROT UR-MCNC: SIGNIFICANT CHANGE UP MG/DL
RBC CASTS # UR COMP ASSIST: 0 /HPF — SIGNIFICANT CHANGE UP (ref 0–4)
SP GR SPEC: 1.03 — SIGNIFICANT CHANGE UP (ref 1–1.03)
UROBILINOGEN FLD QL: 1 MG/DL — SIGNIFICANT CHANGE UP (ref 0.2–1)
WBC UR QL: 8 /HPF — HIGH (ref 0–5)

## 2025-04-20 PROCEDURE — 99232 SBSQ HOSP IP/OBS MODERATE 35: CPT

## 2025-04-20 PROCEDURE — 93010 ELECTROCARDIOGRAM REPORT: CPT

## 2025-04-20 PROCEDURE — 99232 SBSQ HOSP IP/OBS MODERATE 35: CPT | Mod: GC

## 2025-04-20 RX ORDER — QUETIAPINE FUMARATE 25 MG/1
12.5 TABLET ORAL DAILY
Refills: 0 | Status: DISCONTINUED | OUTPATIENT
Start: 2025-04-20 | End: 2025-04-28

## 2025-04-20 RX ORDER — CEFPODOXIME PROXETIL 200 MG/1
200 TABLET, FILM COATED ORAL EVERY 12 HOURS
Refills: 0 | Status: DISCONTINUED | OUTPATIENT
Start: 2025-04-20 | End: 2025-04-24

## 2025-04-20 RX ADMIN — Medication 81 MILLIGRAM(S): at 11:48

## 2025-04-20 RX ADMIN — ATORVASTATIN CALCIUM 80 MILLIGRAM(S): 80 TABLET, FILM COATED ORAL at 21:06

## 2025-04-20 RX ADMIN — QUETIAPINE FUMARATE 12.5 MILLIGRAM(S): 25 TABLET ORAL at 21:05

## 2025-04-20 RX ADMIN — QUETIAPINE FUMARATE 12.5 MILLIGRAM(S): 25 TABLET ORAL at 16:00

## 2025-04-20 RX ADMIN — ENOXAPARIN SODIUM 40 MILLIGRAM(S): 100 INJECTION SUBCUTANEOUS at 06:27

## 2025-04-20 RX ADMIN — Medication 2 TABLET(S): at 21:05

## 2025-04-20 RX ADMIN — Medication 650 MILLIGRAM(S): at 21:51

## 2025-04-20 RX ADMIN — Medication 650 MILLIGRAM(S): at 21:06

## 2025-04-20 RX ADMIN — CLOPIDOGREL BISULFATE 75 MILLIGRAM(S): 75 TABLET, FILM COATED ORAL at 11:48

## 2025-04-20 RX ADMIN — LOSARTAN POTASSIUM 25 MILLIGRAM(S): 100 TABLET, FILM COATED ORAL at 05:16

## 2025-04-20 RX ADMIN — CEFPODOXIME PROXETIL 200 MILLIGRAM(S): 200 TABLET, FILM COATED ORAL at 16:51

## 2025-04-20 NOTE — PROGRESS NOTE ADULT - ASSESSMENT
74 year old female with PMH of  COPD, pneumothorax, collapsed lung, and TIAs who presented to the ED at [place] on 4/16 complaining of severe right arm weakness. Per patient she woke up on  2 days prior to admit  with difficulty moving and lifting right arm. NIHSS was 5 in ED.  CTH showed high R parietal encephalomalacia and gliosis related likely to prior infarct, age indeterminate L CR, old BG L lacunar infarct, L thalamic lacunar infarct.  She was not a candidate for IV thrombolytics because she was out of the window. CTA was neg for LVO.  MRI brain this morning reveals acute L infarct in the corona radiate. She was placed on DAPT. Course complicated by elevated LFTs with workup unrevealing. She was evaluated for admission to acute inpatient rehab and admitted to [facility] on 4/18/25.     #metabolic encephalopathy, likely due to UTI  -mild elevated leukocytosis  -upon chart review, AOX3 upon admission, now confused/agitated  -pt uncooperative with testing, IV access  -check UA (ordered)  -Start on vantin 200mg BID for 5 days for possible UTI  -Seroquel PRN agitation  -monitor cbc and fevers    #Left corona radiata infarct now with dysarthria, right sided weakness, Gait Instability, ADL impairments and Functional impairments  - Comprehensive Rehab Program  -Continue DAPT x 21 days and then aspirin 81mg daily  -Continue statin     #HTN  -Losartan 25mg daily     #COPD  -Continue albuterol PRN  -Home med: trelegy daily  -Advair plus Spiriva while admitted   -resume Trelegy upon discharge      #GI/Bowel Mgmt   - Continue Senna at bedtime   - Miralax daily       #DVT prophylaxis   - Lovenox

## 2025-04-20 NOTE — PROGRESS NOTE ADULT - ASSESSMENT
74 year old female with PMH of  COPD, pneumothorax, collapsed lung, and TIAs who presented to the ED at [place] on 4/16 complaining of severe right arm weakness. Per patient she woke up on  2 days prior to admit  with difficulty moving and lifting right arm. NIHSS was 5 in ED.  CTH showed high R parietal encephalomalacia and gliosis related likely to prior infarct, age indeterminate L CR, old BG L lacunar infarct, L thalamic lacunar infarct.  She was not a candidate for IV thrombolytics because she was out of the window. CTA was neg for LVO.  MRI brain this morning reveals acute L infarct in the corona radiate. She was placed on DAPT. Course complicated by elevated LFTs with workup unrevealing. She was evaluated for admission to acute inpatient rehab and admitted to [facility] on 4/18/25.       #Left Thalamic infarct now with dysarthria, right sided weakness, Gait Instability, ADL impairments and Functional impairments  #S/P Fall   -Comprehensive Rehab Program of PT/OT/SLP  -DAPT x 21 days and then aspirin 81mg daily  -CAtorvastatin 80 mg po daily   -Oral hygiene x 3 daily  -Right wrist and hand x ray (04/19)   - Lipid profile (04/19) 221/118/43/156  - A1C (04/19) 5.9     #Leukocytosis:  - WBC (04/19) 10.87  - No fever  - Check UA, pending   - Monitor     #Agitation   - EKG--> QTC below 500   - Seroquel 12.5 at 8 pm        #HTN  -Losartan 25mg daily     #COPD  -Albuterol PRN  -Home med: trelegy daily - can restart on discharge - therapeutic interchange to Advair plus Spiriva while admitted     #Pain control  - Tylenol PRN    #GI/Bowel Mgmt   - Senna at bedtime   - Miralax daily     #Bladder management  - Continue to monitor PVR x1. Completed    - Voiding   - Scheduled voiding time q 4 hours while awake     #DVT prophylaxis   - Lovenox  - TEDs     #Skin:  - b/l blanchable redness heels, BUE ecchymosis, 6.5cm gluteal fissure , stage I pressure injury to gluteal cleft    FEN   - Diet - Regular with thins    - SLP treatment ongoing     Precautions / PROPHYLAXIS:   - Falls  - ortho: Weight bearing status: WBAT   - Lungs: Aspiration  - Pressure injury/Skin: OOB to Chair, PT/OT        Gela Carrillo  Neurology  [address] [facility], [address]  [place] [address]  Phone: [phone]  Fax: [phone]  Follow Up Time: 1 week    [facility]  [facility] [address]  Scheduled Appointment: 06/25/2025   74 year old female with PMH of  COPD, pneumothorax, collapsed lung, and TIAs who presented to the ED at [place] on 4/16 complaining of severe right arm weakness. Per patient she woke up on  2 days prior to admit  with difficulty moving and lifting right arm. NIHSS was 5 in ED.  CTH showed high R parietal encephalomalacia and gliosis related likely to prior infarct, age indeterminate L CR, old BG L lacunar infarct, L thalamic lacunar infarct.  She was not a candidate for IV thrombolytics because she was out of the window. CTA was neg for LVO.  MRI brain this morning reveals acute L infarct in the corona radiate. She was placed on DAPT. Course complicated by elevated LFTs with workup unrevealing. She was evaluated for admission to acute inpatient rehab and admitted to [facility] on 4/18/25.       #Left Thalamic infarct now with dysarthria, right sided weakness, Gait Instability, ADL impairments and Functional impairments  #S/P Fall   -Comprehensive Rehab Program of PT/OT/SLP  -DAPT x 21 days and then aspirin 81mg daily  -CAtorvastatin 80 mg po daily   -Oral hygiene x 3 daily  -Right wrist and hand x ray (04/19)   - Lipid profile (04/19) 221/118/43/156  - A1C (04/19) 5.9     #Leukocytosis:  - WBC (04/19) 10.87  - No fever  - Check UA, pending  - Vantin 200 mg po x 2 for 5 days. Change per C/S    - Monitor     #Agitation   - EKG--> QTC below 500   - Seroquel 12.5 at 8 pm  - Seroquel 12.5 mg po during day time PRN   - Enhanced SV ordered         #HTN  -Losartan 25mg daily     #COPD  -Albuterol PRN  -Home med: trelegy daily - can restart on discharge - therapeutic interchange to Advair plus Spiriva while admitted     #Pain control  - Tylenol PRN    #GI/Bowel Mgmt   - Senna at bedtime   - Miralax daily     #Bladder management  - Continue to monitor PVR x1. Completed    - Voiding   - Scheduled voiding time q 4 hours while awake     #DVT prophylaxis   - Lovenox  - TEDs     #Skin:  - b/l blanchable redness heels, BUE ecchymosis, 6.5cm gluteal fissure , stage I pressure injury to gluteal cleft    FEN   - Diet - Regular with thins    - SLP treatment ongoing     Precautions / PROPHYLAXIS:   - Falls  - ortho: Weight bearing status: WBAT   - Lungs: Aspiration  - Pressure injury/Skin: OOB to Chair, PT/OT        Gela Carrillo  Neurology  [address] [place] [address], [address] [place] [address]  Phone: [phone]  Fax: [phone]  Follow Up Time: 1 week    Mirlande Torres  [facility]  [facility] [address]  Scheduled Appointment: 06/25/2025

## 2025-04-20 NOTE — PROGRESS NOTE ADULT - SUBJECTIVE AND OBJECTIVE BOX
HPI:  74 year old female with PMH of  COPD, pneumothorax, collapsed lung, and TIAs who presented to the ED at [place] on 4/16 complaining of severe right arm weakness. Per patient she woke up on  2 days prior to admit  with difficulty moving and lifting right arm. NIHSS was 5 in ED.  CTH showed high R parietal encephalomalacia and gliosis related likely to prior infarct, age indeterminate L CR, old BG L lacunar infarct, L thalamic lacunar infarct.  She was not a candidate for IV thrombolytics because she was out of the window. CTA was neg for LVO.  MRI brain this morning reveals acute L infarct in the corona radiate. She was placed on DAPT. Course complicated by elevated LFTs with workup unrevealing. She was evaluated for admission to acute inpatient rehab and admitted to [facility] on 4/18/25.      Allergies  No Known Allergies    Subjective:  - Patient was seen and examined at bedside, NAD  - Slept well at night, No events  - Denies any pain.  - LBM (04/18), voiding. Incontinent of urine at times.    - Gets agitated easily  - Tolerating therapy    ROS:  - Denies CP, palpitation, SOB, cough, fever, chills, headache, dizziness, visual changes, abdominal pain, N/V/D/C, dysuria, hematuria, (+) joint pain, agitation    MEDICATIONS  (STANDING):  aspirin  chewable 81 milliGRAM(s) Oral daily  atorvastatin 80 milliGRAM(s) Oral at bedtime  cefpodoxime 200 milliGRAM(s) Oral every 12 hours  clopidogrel Tablet 75 milliGRAM(s) Oral daily  enoxaparin Injectable 40 milliGRAM(s) SubCutaneous every 24 hours  fluticasone furoate/umeclidinium/vilanterol 200-62.5-25 MICROgram(s) Inhaler 1 Puff(s) Inhalation daily  losartan 25 milliGRAM(s) Oral daily  polyethylene glycol 3350 17 Gram(s) Oral daily  QUEtiapine 12.5 milliGRAM(s) Oral <User Schedule>  senna 2 Tablet(s) Oral at bedtime    MEDICATIONS  (PRN):  acetaminophen     Tablet .. 650 milliGRAM(s) Oral every 6 hours PRN Mild Pain (1 - 3)  albuterol    90 MICROgram(s) HFA Inhaler 2 Puff(s) Inhalation every 6 hours PRN Shortness of Breath and/or Wheezing  lidocaine   4% Patch 1 Patch Transdermal daily PRN pain      Recent Labs:                        12.0   10.87 )-----------( 276      ( 19 Apr 2025 06:46 )             35.6     04-19    138  |  106  |  13  ----------------------------<  116[H]  3.7   |  23  |  0.64    Ca    8.8      19 Apr 2025 06:46    TPro  6.4  /  Alb  3.0[L]  /  TBili  0.7  /  DBili  x   /  AST  29  /  ALT  28  /  AlkPhos  104  04-19    LIVER FUNCTIONS - ( 19 Apr 2025 06:46 )  Alb: 3.0 g/dL / Pro: 6.4 g/dL / ALK PHOS: 104 U/L / ALT: 28 U/L / AST: 29 U/L / GGT: x         Physical Exam:  Vital Signs Last 24 Hrs  T(C): 36.4 (20 Apr 2025 08:18), Max: 36.7 (19 Apr 2025 20:36)  T(F): 97.6 (20 Apr 2025 08:18), Max: 98.1 (19 Apr 2025 20:36)  HR: 83 (20 Apr 2025 08:18) (79 - 99)  BP: 120/77 (20 Apr 2025 08:18) (119/74 - 135/82)  RR: 16 (20 Apr 2025 08:18) (16 - 16)  SpO2: 95% (20 Apr 2025 08:18) (95% - 95%)  Parameters below as of 20 Apr 2025 08:18  Patient On (Oxygen Delivery Method): room air    Gen - NAD, Comfortable  HEENT - NCAT, EOMI, PERRLA   Neck - Supple, No limited ROM  Pulm - CTAB, No crackles  Cardiovascular - RRR, S1S2   Chest - good chest expansion, good respiratory effort  Abdomen - Soft, NT, ND   Extremities - No C/C/E, no calf tenderness  Neuro-     Cognitive - awake, alert, oriented to person, place, month, year, and situation.  Able  to follow command     Communication - Fluent, Comprehensible, (+) slight dysarthric , (+) repetition intact      Attention: Intact, able to state days of week chronologically and backwards with persistent prompting. Able to perform      simple additions and subtractions, gets agitated easily     Memory: Recall 1/3 3 objects immediate and 3 min later,     Cranial Nerves -R NLF asymmetry , Tongue midline, EOMI, L Shoulder shrug intact     Motor - Right hemiparesis                    LEFT    UE - ShAB 5/5, EF 5/5, EE 5/5,  5/5                    RIGHT UE - ShAB 1/5, EF 2/5, EE 2/5,   1/5                    LEFT    LE - HF 4/5, KE 5/5, DF 5/5, PF 5/5                    RIGHT LE - HF 4/5, KE 4/5, DF 2/5, PF 3/5        Sensory - Intact  to LT     Coordination - FTN/HTS impaired to right 2/2 weakness, +slight dysmetria RLE     Tone - Normal  Psychiatric - Mood stable, Affect WNL  Skin:  b/l blanchable redness heels, BUE ecchymosis, 6.5cm gluteal fissure, Stage I gluteal cleft pressure injury                          HPI:  74 year old female with PMH of  COPD, pneumothorax, collapsed lung, and TIAs who presented to the ED at [place] on 4/16 complaining of severe right arm weakness. Per patient she woke up on  2 days prior to admit  with difficulty moving and lifting right arm. NIHSS was 5 in ED.  CTH showed high R parietal encephalomalacia and gliosis related likely to prior infarct, age indeterminate L CR, old BG L lacunar infarct, L thalamic lacunar infarct.  She was not a candidate for IV thrombolytics because she was out of the window. CTA was neg for LVO.  MRI brain this morning reveals acute L infarct in the corona radiate. She was placed on DAPT. Course complicated by elevated LFTs with workup unrevealing. She was evaluated for admission to acute inpatient rehab and admitted to [facility] on 4/18/25.      Allergies  No Known Allergies    Subjective:  - Patient was seen and examined at bedside, NAD  - Slept well at night, No events  - Denies any pain.  - LBM (04/18), voiding. Incontinent of urine at times.    - Gets agitated easily, (+) Leukocytosis likely 2/2 UTI  - Tolerating therapy    ROS:  - Denies CP, palpitation, SOB, cough, fever, chills, headache, dizziness, visual changes, abdominal pain, N/V/D/C, dysuria, hematuria, (+) joint pain, agitation    MEDICATIONS  (STANDING):  aspirin  chewable 81 milliGRAM(s) Oral daily  atorvastatin 80 milliGRAM(s) Oral at bedtime  cefpodoxime 200 milliGRAM(s) Oral every 12 hours  clopidogrel Tablet 75 milliGRAM(s) Oral daily  enoxaparin Injectable 40 milliGRAM(s) SubCutaneous every 24 hours  fluticasone furoate/umeclidinium/vilanterol 200-62.5-25 MICROgram(s) Inhaler 1 Puff(s) Inhalation daily  losartan 25 milliGRAM(s) Oral daily  polyethylene glycol 3350 17 Gram(s) Oral daily  QUEtiapine 12.5 milliGRAM(s) Oral <User Schedule>  senna 2 Tablet(s) Oral at bedtime    MEDICATIONS  (PRN):  acetaminophen     Tablet .. 650 milliGRAM(s) Oral every 6 hours PRN Mild Pain (1 - 3)  albuterol    90 MICROgram(s) HFA Inhaler 2 Puff(s) Inhalation every 6 hours PRN Shortness of Breath and/or Wheezing  lidocaine   4% Patch 1 Patch Transdermal daily PRN pain      Recent Labs:                        12.0   10.87 )-----------( 276      ( 19 Apr 2025 06:46 )             35.6     04-19    138  |  106  |  13  ----------------------------<  116[H]  3.7   |  23  |  0.64    Ca    8.8      19 Apr 2025 06:46    TPro  6.4  /  Alb  3.0[L]  /  TBili  0.7  /  DBili  x   /  AST  29  /  ALT  28  /  AlkPhos  104  04-19    LIVER FUNCTIONS - ( 19 Apr 2025 06:46 )  Alb: 3.0 g/dL / Pro: 6.4 g/dL / ALK PHOS: 104 U/L / ALT: 28 U/L / AST: 29 U/L / GGT: x         Physical Exam:  Vital Signs Last 24 Hrs  T(C): 36.4 (20 Apr 2025 08:18), Max: 36.7 (19 Apr 2025 20:36)  T(F): 97.6 (20 Apr 2025 08:18), Max: 98.1 (19 Apr 2025 20:36)  HR: 83 (20 Apr 2025 08:18) (79 - 99)  BP: 120/77 (20 Apr 2025 08:18) (119/74 - 135/82)  RR: 16 (20 Apr 2025 08:18) (16 - 16)  SpO2: 95% (20 Apr 2025 08:18) (95% - 95%)  Parameters below as of 20 Apr 2025 08:18  Patient On (Oxygen Delivery Method): room air    Gen - NAD, Comfortable  HEENT - NCAT, EOMI, PERRLA   Neck - Supple, No limited ROM  Pulm - CTAB, No crackles  Cardiovascular - RRR, S1S2   Chest - good chest expansion, good respiratory effort  Abdomen - Soft, NT, ND   Extremities - No C/C/E, no calf tenderness  Neuro-     Cognitive - awake, alert, oriented to person, place, month, year, and situation.  Able  to follow command     Communication - Fluent, Comprehensible, (+) slight dysarthric , (+) repetition intact      Attention: Intact, able to state days of week chronologically and backwards with persistent prompting. Able to perform      simple additions and subtractions, gets agitated easily     Memory: Recall 1/3 3 objects immediate and 3 min later,     Cranial Nerves -R NLF asymmetry , Tongue midline, EOMI, L Shoulder shrug intact     Motor - Right hemiparesis                    LEFT    UE - ShAB 5/5, EF 5/5, EE 5/5,  5/5                    RIGHT UE - ShAB 1/5, EF 2/5, EE 2/5,   1/5                    LEFT    LE - HF 4/5, KE 5/5, DF 5/5, PF 5/5                    RIGHT LE - HF 4/5, KE 4/5, DF 2/5, PF 3/5        Sensory - Intact  to LT     Coordination - FTN/HTS impaired to right 2/2 weakness, +slight dysmetria RLE     Tone - Normal  Psychiatric - Mood stable, Affect WNL  Skin:  b/l blanchable redness heels, BUE ecchymosis, 6.5cm gluteal fissure, Stage I gluteal cleft pressure injury

## 2025-04-20 NOTE — PROGRESS NOTE ADULT - SUBJECTIVE AND OBJECTIVE BOX
PROGRESS NOTE:     Patient is a 74y old  Female who presents with a chief complaint of Left thalamic ischemic stroke with right hemiparesis (20 Apr 2025 10:14)          SUBJECTIVE & OBJECTIVE:   Pt seen and examined at bedside in AM. noted pt with increase urinary frequency/hesitancy. able to collect urine      REVIEW OF SYSTEMS: remaining ROS negative     PHYSICAL EXAM:  VITALS:  Vital Signs Last 24 Hrs  T(C): 36.4 (20 Apr 2025 08:18), Max: 36.7 (19 Apr 2025 20:36)  T(F): 97.6 (20 Apr 2025 08:18), Max: 98.1 (19 Apr 2025 20:36)  HR: 83 (20 Apr 2025 08:18) (79 - 99)  BP: 120/77 (20 Apr 2025 08:18) (119/74 - 135/82)  BP(mean): --  RR: 16 (20 Apr 2025 08:18) (16 - 16)  SpO2: 95% (20 Apr 2025 08:18) (95% - 95%)    Parameters below as of 20 Apr 2025 08:18  Patient On (Oxygen Delivery Method): room air          GENERAL: NAD,  no increased WOB  HEAD:  Atraumatic, Normocephalic  EYES: EOMI, conjunctiva and sclera clear  ENMT: Moist mucous membranes  NECK: Supple, No JVD  NERVOUS SYSTEM:  Aler  CHEST/LUNG: Clear to auscultation bilaterally; No rales, rhonchi, wheezing  HEART: Regular rate and rhythm  ABDOMEN: Soft, Nontender, Nondistended; Bowel sounds present  EXTREMITIES:  No clubbing, cyanosis, calf tenderness or edema b/l      MEDICATIONS  (STANDING):  aspirin  chewable 81 milliGRAM(s) Oral daily  atorvastatin 80 milliGRAM(s) Oral at bedtime  cefpodoxime 200 milliGRAM(s) Oral every 12 hours  clopidogrel Tablet 75 milliGRAM(s) Oral daily  enoxaparin Injectable 40 milliGRAM(s) SubCutaneous every 24 hours  fluticasone furoate/umeclidinium/vilanterol 200-62.5-25 MICROgram(s) Inhaler 1 Puff(s) Inhalation daily  losartan 25 milliGRAM(s) Oral daily  polyethylene glycol 3350 17 Gram(s) Oral daily  QUEtiapine 12.5 milliGRAM(s) Oral <User Schedule>  senna 2 Tablet(s) Oral at bedtime    MEDICATIONS  (PRN):  acetaminophen     Tablet .. 650 milliGRAM(s) Oral every 6 hours PRN Mild Pain (1 - 3)  albuterol    90 MICROgram(s) HFA Inhaler 2 Puff(s) Inhalation every 6 hours PRN Shortness of Breath and/or Wheezing  lidocaine   4% Patch 1 Patch Transdermal daily PRN pain  QUEtiapine 12.5 milliGRAM(s) Oral daily PRN agitation      Allergies    No Known Allergies    Intolerances              LABS:                           12.0   10.87 )-----------( 276      ( 19 Apr 2025 06:46 )             35.6     04-19    138  |  106  |  13  ----------------------------<  116[H]  3.7   |  23  |  0.64    Ca    8.8      19 Apr 2025 06:46    TPro  6.4  /  Alb  3.0[L]  /  TBili  0.7  /  DBili  x   /  AST  29  /  ALT  28  /  AlkPhos  104  04-19      Urinalysis Basic - ( 19 Apr 2025 06:46 )    Color: x / Appearance: x / SG: x / pH: x  Gluc: 116 mg/dL / Ketone: x  / Bili: x / Urobili: x   Blood: x / Protein: x / Nitrite: x   Leuk Esterase: x / RBC: x / WBC x   Sq Epi: x / Non Sq Epi: x / Bacteria: x      CAPILLARY BLOOD GLUCOSE                    RECENT CULTURES:          RADIOLOGY & ADDITIONAL TESTS:

## 2025-04-20 NOTE — PROGRESS NOTE ADULT - NSPROGADDITIONALINFOA_GEN_ALL_CORE
Saw and evaluated the patient, reviewed medical records, took history, examined, started an assessment and management plan. Spent 70 minutes

## 2025-04-21 DIAGNOSIS — F43.20 ADJUSTMENT DISORDER, UNSPECIFIED: ICD-10-CM

## 2025-04-21 LAB
ALBUMIN SERPL ELPH-MCNC: 2.9 G/DL — LOW (ref 3.3–5)
ALP SERPL-CCNC: 99 U/L — SIGNIFICANT CHANGE UP (ref 40–120)
ALT FLD-CCNC: 37 U/L — SIGNIFICANT CHANGE UP (ref 10–45)
ANION GAP SERPL CALC-SCNC: 10 MMOL/L — SIGNIFICANT CHANGE UP (ref 5–17)
AST SERPL-CCNC: 48 U/L — HIGH (ref 10–40)
BASOPHILS # BLD AUTO: 0.08 K/UL — SIGNIFICANT CHANGE UP (ref 0–0.2)
BASOPHILS NFR BLD AUTO: 1.3 % — SIGNIFICANT CHANGE UP (ref 0–2)
BILIRUB SERPL-MCNC: 0.9 MG/DL — SIGNIFICANT CHANGE UP (ref 0.2–1.2)
BUN SERPL-MCNC: 20 MG/DL — SIGNIFICANT CHANGE UP (ref 7–23)
CALCIUM SERPL-MCNC: 9 MG/DL — SIGNIFICANT CHANGE UP (ref 8.4–10.5)
CHLORIDE SERPL-SCNC: 107 MMOL/L — SIGNIFICANT CHANGE UP (ref 96–108)
CO2 SERPL-SCNC: 21 MMOL/L — LOW (ref 22–31)
CREAT SERPL-MCNC: 0.9 MG/DL — SIGNIFICANT CHANGE UP (ref 0.5–1.3)
CULTURE RESULTS: SIGNIFICANT CHANGE UP
EGFR: 67 ML/MIN/1.73M2 — SIGNIFICANT CHANGE UP
EGFR: 67 ML/MIN/1.73M2 — SIGNIFICANT CHANGE UP
EOSINOPHIL # BLD AUTO: 0.15 K/UL — SIGNIFICANT CHANGE UP (ref 0–0.5)
EOSINOPHIL NFR BLD AUTO: 2.4 % — SIGNIFICANT CHANGE UP (ref 0–6)
GLUCOSE SERPL-MCNC: 97 MG/DL — SIGNIFICANT CHANGE UP (ref 70–99)
HCT VFR BLD CALC: 39.1 % — SIGNIFICANT CHANGE UP (ref 34.5–45)
HGB BLD-MCNC: 12.8 G/DL — SIGNIFICANT CHANGE UP (ref 11.5–15.5)
IMM GRANULOCYTES NFR BLD AUTO: 0.3 % — SIGNIFICANT CHANGE UP (ref 0–0.9)
LYMPHOCYTES # BLD AUTO: 1.1 K/UL — SIGNIFICANT CHANGE UP (ref 1–3.3)
LYMPHOCYTES # BLD AUTO: 17.4 % — SIGNIFICANT CHANGE UP (ref 13–44)
MCHC RBC-ENTMCNC: 30 PG — SIGNIFICANT CHANGE UP (ref 27–34)
MCHC RBC-ENTMCNC: 32.7 G/DL — SIGNIFICANT CHANGE UP (ref 32–36)
MCV RBC AUTO: 91.8 FL — SIGNIFICANT CHANGE UP (ref 80–100)
MONOCYTES # BLD AUTO: 0.77 K/UL — SIGNIFICANT CHANGE UP (ref 0–0.9)
MONOCYTES NFR BLD AUTO: 12.1 % — SIGNIFICANT CHANGE UP (ref 2–14)
NEUTROPHILS # BLD AUTO: 4.22 K/UL — SIGNIFICANT CHANGE UP (ref 1.8–7.4)
NEUTROPHILS NFR BLD AUTO: 66.5 % — SIGNIFICANT CHANGE UP (ref 43–77)
NRBC BLD AUTO-RTO: 0 /100 WBCS — SIGNIFICANT CHANGE UP (ref 0–0)
PLATELET # BLD AUTO: 273 K/UL — SIGNIFICANT CHANGE UP (ref 150–400)
POTASSIUM SERPL-MCNC: 4.5 MMOL/L — SIGNIFICANT CHANGE UP (ref 3.5–5.3)
POTASSIUM SERPL-SCNC: 4.5 MMOL/L — SIGNIFICANT CHANGE UP (ref 3.5–5.3)
PROT SERPL-MCNC: 6.6 G/DL — SIGNIFICANT CHANGE UP (ref 6–8.3)
RBC # BLD: 4.26 M/UL — SIGNIFICANT CHANGE UP (ref 3.8–5.2)
RBC # FLD: 13.3 % — SIGNIFICANT CHANGE UP (ref 10.3–14.5)
SODIUM SERPL-SCNC: 138 MMOL/L — SIGNIFICANT CHANGE UP (ref 135–145)
SPECIMEN SOURCE: SIGNIFICANT CHANGE UP
WBC # BLD: 6.34 K/UL — SIGNIFICANT CHANGE UP (ref 3.8–10.5)
WBC # FLD AUTO: 6.34 K/UL — SIGNIFICANT CHANGE UP (ref 3.8–10.5)

## 2025-04-21 PROCEDURE — 70450 CT HEAD/BRAIN W/O DYE: CPT | Mod: 26

## 2025-04-21 PROCEDURE — 99232 SBSQ HOSP IP/OBS MODERATE 35: CPT | Mod: GC

## 2025-04-21 PROCEDURE — 90792 PSYCH DIAG EVAL W/MED SRVCS: CPT

## 2025-04-21 PROCEDURE — 99232 SBSQ HOSP IP/OBS MODERATE 35: CPT

## 2025-04-21 RX ADMIN — CLOPIDOGREL BISULFATE 75 MILLIGRAM(S): 75 TABLET, FILM COATED ORAL at 11:48

## 2025-04-21 RX ADMIN — Medication 2 TABLET(S): at 21:56

## 2025-04-21 RX ADMIN — ATORVASTATIN CALCIUM 80 MILLIGRAM(S): 80 TABLET, FILM COATED ORAL at 21:56

## 2025-04-21 RX ADMIN — Medication 81 MILLIGRAM(S): at 11:48

## 2025-04-21 RX ADMIN — LOSARTAN POTASSIUM 25 MILLIGRAM(S): 100 TABLET, FILM COATED ORAL at 06:12

## 2025-04-21 RX ADMIN — CEFPODOXIME PROXETIL 200 MILLIGRAM(S): 200 TABLET, FILM COATED ORAL at 17:48

## 2025-04-21 RX ADMIN — QUETIAPINE FUMARATE 12.5 MILLIGRAM(S): 25 TABLET ORAL at 22:00

## 2025-04-21 RX ADMIN — QUETIAPINE FUMARATE 12.5 MILLIGRAM(S): 25 TABLET ORAL at 21:56

## 2025-04-21 RX ADMIN — CEFPODOXIME PROXETIL 200 MILLIGRAM(S): 200 TABLET, FILM COATED ORAL at 05:00

## 2025-04-21 RX ADMIN — ENOXAPARIN SODIUM 40 MILLIGRAM(S): 100 INJECTION SUBCUTANEOUS at 06:12

## 2025-04-21 NOTE — DIETITIAN INITIAL EVALUATION ADULT - ADD RECOMMEND
1) Monitor Weights, Intake, Tolerance, Skin & Labwork  2) Recommend Change Diet to Regular Diet  3) Recommend Initiate Multivitamin Daily  4) Education Provided on Proper Nutrition   5) Continue Nutrition Plan of Care

## 2025-04-21 NOTE — DIETITIAN INITIAL EVALUATION ADULT - PERTINENT MEDS FT
MEDICATIONS  (STANDING):  aspirin  chewable 81 milliGRAM(s) Oral daily  atorvastatin 80 milliGRAM(s) Oral at bedtime  cefpodoxime 200 milliGRAM(s) Oral every 12 hours  clopidogrel Tablet 75 milliGRAM(s) Oral daily  enoxaparin Injectable 40 milliGRAM(s) SubCutaneous every 24 hours  fluticasone furoate/umeclidinium/vilanterol 200-62.5-25 MICROgram(s) Inhaler 1 Puff(s) Inhalation daily  losartan 25 milliGRAM(s) Oral daily  polyethylene glycol 3350 17 Gram(s) Oral daily  QUEtiapine 12.5 milliGRAM(s) Oral <User Schedule>  senna 2 Tablet(s) Oral at bedtime    MEDICATIONS  (PRN):  acetaminophen     Tablet .. 650 milliGRAM(s) Oral every 6 hours PRN Mild Pain (1 - 3)  albuterol    90 MICROgram(s) HFA Inhaler 2 Puff(s) Inhalation every 6 hours PRN Shortness of Breath and/or Wheezing  lidocaine   4% Patch 1 Patch Transdermal daily PRN pain  QUEtiapine 12.5 milliGRAM(s) Oral daily PRN agitation

## 2025-04-21 NOTE — BH CONSULTATION LIAISON ASSESSMENT NOTE - NSBHCHARTREVIEWVS_PSY_A_CORE FT
Vital Signs Last 24 Hrs  T(C): 36.6 (21 Apr 2025 07:42), Max: 36.6 (20 Apr 2025 21:04)  T(F): 97.8 (21 Apr 2025 07:42), Max: 97.8 (20 Apr 2025 21:04)  HR: 70 (21 Apr 2025 07:42) (67 - 87)  BP: 151/85 (21 Apr 2025 07:42) (120/72 - 151/85)  BP(mean): --  RR: 16 (21 Apr 2025 07:42) (16 - 16)  SpO2: 93% (21 Apr 2025 07:42) (93% - 96%)    Parameters below as of 21 Apr 2025 07:42  Patient On (Oxygen Delivery Method): room air

## 2025-04-21 NOTE — BH CONSULTATION LIAISON ASSESSMENT NOTE - NSACTIVEVENT_PSY_ALL_CORE
Triggering events leading to humiliation, shame, and/or despair (e.g., Loss of relationship, financial or health status) (real or anticipated)/Chronic pain or other acute medical condition/Recent onset of psychiatric illness

## 2025-04-21 NOTE — BH CONSULTATION LIAISON ASSESSMENT NOTE - NSBHCONSULTRECOMMENDOTHER_PSY_A_CORE FT
- Continue with Seroquel 12.5mg @ 2000.  - Recommend delirium precautions including frequent reorientation (including calendars and clocks), maintaining a calm environment with appropriate lighting depending on daytime, ensuring adequate nutrition/fluid intake, ongoing mobility, cognitive stimulation, and encouraging family involvement.

## 2025-04-21 NOTE — BH CONSULTATION LIAISON ASSESSMENT NOTE - NSBHMSEKNOW_PSY_A_CORE
Subjective:       Patient ID: Norberto Hou is a 68 y.o. male.    Chief Complaint: Hypertension (6 mo follow up)    Here for f/u htn. Doing well overall.     Hypertension  This is a chronic problem. The current episode started more than 1 year ago. The problem is controlled. Pertinent negatives include no chest pain, palpitations or shortness of breath.     Review of Systems   Constitutional: Negative for chills and fever.   Respiratory: Negative for cough, chest tightness and shortness of breath.    Cardiovascular: Negative for chest pain, palpitations and leg swelling.   Endocrine: Negative for cold intolerance and heat intolerance.   Psychiatric/Behavioral: Negative for decreased concentration. The patient is not nervous/anxious.        Objective:      Physical Exam  Vitals signs and nursing note reviewed.   Constitutional:       Appearance: He is well-developed.   HENT:      Head: Normocephalic and atraumatic.   Cardiovascular:      Rate and Rhythm: Normal rate and regular rhythm.      Heart sounds: Normal heart sounds.   Pulmonary:      Effort: Pulmonary effort is normal.      Breath sounds: Normal breath sounds.         Assessment:       1. Essential hypertension        Plan:       Essential hypertension  -     Comprehensive metabolic panel; Future; Expected date: 09/17/2020      htn stable and continue home checks; update cmp; not taking norvasc currently  Will monitor chronic medical issues and continue current plan of care.  Pneumonia vaccine soon but declined as he his getting flu shot today    Follow up in about 6 months (around 3/17/2021), or if symptoms worsen or fail to improve.       Normal

## 2025-04-21 NOTE — BH CONSULTATION LIAISON ASSESSMENT NOTE - SUMMARY
Patient is a 74y old  Female who presents with a chief complaint of Left thalamic ischemic stroke with right hemiparesis.     Psychiatry consulted for agitation. Per nursing, patient was verbally abusive and uncooperative with treatment team yesterday. However, appears to have significantly improved when compared to yesterday; more calm, cooperative, and engaged with treatment team.

## 2025-04-21 NOTE — PROGRESS NOTE ADULT - SUBJECTIVE AND OBJECTIVE BOX
HPI:  74 year old female with PMH of  COPD, pneumothorax, collapsed lung, and TIAs who presented to the ED at [facility] on 4/16 complaining of severe right arm weakness. Per patient she woke up on  2 days prior to admit  with difficulty moving and lifting right arm. NIHSS was 5 in ED.  CTH showed high R parietal encephalomalacia and gliosis related likely to prior infarct, age indeterminate L CR, old BG L lacunar infarct, L thalamic lacunar infarct.  She was not a candidate for IV thrombolytics because she was out of the window. CTA was neg for LVO.  MRI brain this morning reveals acute L infarct in the corona radiate. She was placed on DAPT. Course complicated by elevated LFTs with workup unrevealing. She was evaluated for admission to acute inpatient rehab and admitted to [facility] on 4/18/25.      Allergies  No Known Allergies    Subjective:  - Patient was seen and examined at bedside, NAD  - Slept well at night, No events  - Denies any pain.  - LBM (04/18), voiding. Incontinent of urine at times.    - Gets agitated easily, (+) Leukocytosis likely 2/2 UTI  - Tolerating therapy    ROS:  - Denies CP, palpitation, SOB, cough, fever, chills, headache, dizziness, visual changes, abdominal pain, N/V/D/C, dysuria, hematuria, (+) joint pain, agitation    MEDICATIONS  (STANDING):  aspirin  chewable 81 milliGRAM(s) Oral daily  atorvastatin 80 milliGRAM(s) Oral at bedtime  cefpodoxime 200 milliGRAM(s) Oral every 12 hours  clopidogrel Tablet 75 milliGRAM(s) Oral daily  enoxaparin Injectable 40 milliGRAM(s) SubCutaneous every 24 hours  fluticasone furoate/umeclidinium/vilanterol 200-62.5-25 MICROgram(s) Inhaler 1 Puff(s) Inhalation daily  losartan 25 milliGRAM(s) Oral daily  polyethylene glycol 3350 17 Gram(s) Oral daily  QUEtiapine 12.5 milliGRAM(s) Oral <User Schedule>  senna 2 Tablet(s) Oral at bedtime    MEDICATIONS  (PRN):  acetaminophen     Tablet .. 650 milliGRAM(s) Oral every 6 hours PRN Mild Pain (1 - 3)  albuterol    90 MICROgram(s) HFA Inhaler 2 Puff(s) Inhalation every 6 hours PRN Shortness of Breath and/or Wheezing  lidocaine   4% Patch 1 Patch Transdermal daily PRN pain  QUEtiapine 12.5 milliGRAM(s) Oral daily PRN agitation                          12.8   6.34  )-----------( 273      ( 21 Apr 2025 06:35 )             39.1     04-21    138  |  107  |  20  ----------------------------<  97  4.5   |  21[L]  |  0.90    Ca    9.0      21 Apr 2025 06:35    TPro  6.6  /  Alb  2.9[L]  /  TBili  0.9  /  DBili  x   /  AST  48[H]  /  ALT  37  /  AlkPhos  99  04-21      Urinalysis Basic - ( 21 Apr 2025 06:35 )    Color: x / Appearance: x / SG: x / pH: x  Gluc: 97 mg/dL / Ketone: x  / Bili: x / Urobili: x   Blood: x / Protein: x / Nitrite: x   Leuk Esterase: x / RBC: x / WBC x   Sq Epi: x / Non Sq Epi: x / Bacteria: x        Physical Exam:  Vital Signs Last 24 Hrs  T(C): 36.4 (20 Apr 2025 08:18), Max: 36.7 (19 Apr 2025 20:36)  T(F): 97.6 (20 Apr 2025 08:18), Max: 98.1 (19 Apr 2025 20:36)  HR: 83 (20 Apr 2025 08:18) (79 - 99)  BP: 120/77 (20 Apr 2025 08:18) (119/74 - 135/82)  RR: 16 (20 Apr 2025 08:18) (16 - 16)  SpO2: 95% (20 Apr 2025 08:18) (95% - 95%)  Parameters below as of 20 Apr 2025 08:18  Patient On (Oxygen Delivery Method): room air    Gen - NAD, Comfortable  HEENT - NCAT, EOMI, PERRLA   Neck - Supple, No limited ROM  Pulm - CTAB, No crackles  Cardiovascular - RRR, S1S2   Chest - good chest expansion, good respiratory effort  Abdomen - Soft, NT, ND   Extremities - No C/C/E, no calf tenderness  Neuro-     Cognitive - awake, alert, oriented to person, place, month, year, and situation.  Able  to follow command     Communication - Fluent, Comprehensible, (+) slight dysarthric , (+) repetition intact      Attention: Intact, able to state days of week chronologically and backwards with persistent prompting. Able to perform      simple additions and subtractions, gets agitated easily     Memory: Recall 1/3 3 objects immediate and 3 min later,     Cranial Nerves -R NLF asymmetry , Tongue midline, EOMI, L Shoulder shrug intact     Motor - Right hemiparesis                    LEFT    UE - ShAB 5/5, EF 5/5, EE 5/5,  5/5                    RIGHT UE - ShAB 1/5, EF 2/5, EE 2/5,   1/5                    LEFT    LE - HF 4/5, KE 5/5, DF 5/5, PF 5/5                    RIGHT LE - HF 4/5, KE 4/5, DF 2/5, PF 3/5        Sensory - Intact  to LT     Coordination - FTN/HTS impaired to right 2/2 weakness, +slight dysmetria RLE     Tone - Normal  Psychiatric - Mood stable, Affect WNL  Skin:  b/l blanchable redness heels, BUE ecchymosis, 6.5cm gluteal fissure, Stage I gluteal cleft pressure injury

## 2025-04-21 NOTE — DIETITIAN INITIAL EVALUATION ADULT - NSFNSPHYEXAMSKINFT_GEN_A_CORE
Pressure Injury 1: none, Stage I  Pressure Injury 2: none, none  Pressure Injury 3: none, none  Pressure Injury 4: none, none  Pressure Injury 5: none, none  Pressure Injury 6: none, none  Pressure Injury 7: none, none  Pressure Injury 8: none, none  Pressure Injury 9: none, none  Pressure Injury 10: none, none  Pressure Injury 11: none, none

## 2025-04-21 NOTE — DIETITIAN INITIAL EVALUATION ADULT - NS FNS DIET ORDER
DASH-TLC Diet w/ Thin Liquids (IDDSI Level 0)  Recommend Change Diet to Regular Diet   Declines Nutrition Supplementation   Start Multivitamin Daily  Education Provided on Proper Nutrition

## 2025-04-21 NOTE — BH CONSULTATION LIAISON ASSESSMENT NOTE - NSBHCHARTREVIEWLAB_PSY_A_CORE FT
Complete Blood Count + Automated Diff (04.21.25 @ 06:35)   Auto NRBC: 0 /100 WBCs  WBC Count: 6.34 K/uL  RBC Count: 4.26 M/uL  Hemoglobin: 12.8 g/dL  Hematocrit: 39.1 %  Mean Cell Volume: 91.8 fl  Mean Cell Hemoglobin: 30.0 pg  Mean Cell Hemoglobin Conc: 32.7 g/dL  Red Cell Distrib Width: 13.3 %  Platelet Count - Automated: 273 K/uL  Neutrophil #: 4.22 K/uL  Lymphocyte #: 1.10 K/uL  Monocyte #: 0.77 K/uL  Eosinophil #: 0.15 K/uL  Basophil #: 0.08 K/uL  Neutrophil %: 66.5: Differential percentages must be correlated with absolute numbers for   clinical significance. %  Lymphocyte %: 17.4 %  Monocyte %: 12.1 %  Eosinophil %: 2.4 %  Basophil %: 1.3 %  Auto Immature Granulocyte %: 0.3: (Includes meta, myelo and promyelocytes). Mild elevations in immature   granulocytes may be seen with many inflammatory processes and pregnancy;   clinical correlation suggested. %    Comprehensive Metabolic Panel (04.21.25 @ 06:35)   Sodium: 138 mmol/L  Potassium: 4.5: Specimen Hemolyzed mmol/L  Chloride: 107 mmol/L  Carbon Dioxide: 21 mmol/L  Anion Gap: 10 mmol/L  Blood Urea Nitrogen: 20 mg/dL  Creatinine: 0.90 mg/dL  Glucose: 97 mg/dL  Calcium: 9.0 mg/dL  Protein Total: 6.6 g/dL  Albumin: 2.9 g/dL  Bilirubin Total: 0.9 mg/dL  Alkaline Phosphatase: 99 U/L  Aspartate Aminotransferase (AST/SGOT): 48 U/L  Alanine Aminotransferase (ALT/SGPT): 37 U/L  eGFR: 67: The estimated glomerular filtration rate (eGFR) calculation is based on   the 2021 CKD-EPI creatinine equation, which is validated in male and   female population 18 years of age and older (N Engl J Med 2021;   [id number]). mL/min/1.73m2

## 2025-04-21 NOTE — BH CONSULTATION LIAISON ASSESSMENT NOTE - HPI (INCLUDE ILLNESS QUALITY, SEVERITY, DURATION, TIMING, CONTEXT, MODIFYING FACTORS, ASSOCIATED SIGNS AND SYMPTOMS)
Patient is a 74 year old female with PMH of  COPD, pneumothorax, collapsed lung, and TIAs who presented to the ED at [facility] on 4/16 complaining of severe right arm weakness. Per patient she woke up on  2 days prior to admit  with difficulty moving and lifting right arm. NIHSS was 5 in ED.  CTH showed high R parietal encephalomalacia and gliosis related likely to prior infarct, age indeterminate L CR, old BG L lacunar infarct, L thalamic lacunar infarct.  MRI brain this morning reveals acute L infarct in the corona radiate. She was placed on DAPT. Course complicated by elevated LFTs with workup unrevealing. She was evaluated for admission to acute inpatient rehab and admitted to [facility] on 4/18/25. Psychiatry consulted for agitation.    C/L Psychiatry Note:  Chart reviewed and patient evaluated. Per chart review, patient was AAOx3 upon admission, but noted to be confused and agitated; believed to have metabolic encephalopathy secondary to UTI. Per SLP notes, patient with mild-moderate cognitive deficits characterized by deficits in attention, immediate memory, working memory, short-term memory, and executive functioning. Discussed case with nursing, who reports that patient with significant agitation yesterday, specifically was verbally abusive and uncooperative with staff. However, no combativeness was noted. While patient was agitated this morning, she has displayed more calm and cooperative behaviors throughout the day; participated in all afternoon rehab sessions. Upon evaluation, patient is AAOx2 (disoriented to time and situation). Patient admits, "I do feel more confused, I keep mixing things up." Also admits to being more irritable yesterday and earlier today, discussing multiple grievances such as her new environment, requiring assistance for ADLs, having to use a diaper at times, and food preferences. Despite this, she states that she wants to work with rehab therapy to get stronger. Denies any significant mood issues, admits she was feeling depressed immediately after her stroke, but "I got over it after a few days." Denies any issues with appetite or sleep. Denies any feelings of anxiety. Unable to elicit any symptoms of psychosis. Admits to intermittent and low intensity passive suicidal ideation; stating at times she does wonder if not living anymore after her stroke would have been better than dealing with her new right arm deficits. However, she does not tend to ruminate on these thoughts.

## 2025-04-21 NOTE — PROGRESS NOTE ADULT - SUBJECTIVE AND OBJECTIVE BOX
PROGRESS NOTE:     Patient is a 74y old  Female who presents with a chief complaint of Left thalamic ischemic stroke with right hemiparesis (20 Apr 2025 10:14)          SUBJECTIVE & OBJECTIVE:   Pt seen and examined at bedside in AM. uncooperative.      REVIEW OF SYSTEMS: limited     PHYSICAL EXAM:  VITALS:  Vital Signs Last 24 Hrs  T(C): 36.6 (21 Apr 2025 07:42), Max: 36.6 (20 Apr 2025 21:04)  T(F): 97.8 (21 Apr 2025 07:42), Max: 97.8 (20 Apr 2025 21:04)  HR: 70 (21 Apr 2025 07:42) (67 - 87)  BP: 151/85 (21 Apr 2025 07:42) (120/72 - 151/85)  BP(mean): --  RR: 16 (21 Apr 2025 07:42) (16 - 16)  SpO2: 93% (21 Apr 2025 07:42) (93% - 96%)    Parameters below as of 21 Apr 2025 07:42  Patient On (Oxygen Delivery Method): room air          GENERAL: NAD,  no increased WOB  HEAD:  Atraumatic, Normocephalic  EYES: EOMI, conjunctiva and sclera clear  ENMT: Moist mucous membranes  NECK: Supple, No JVD  NERVOUS SYSTEM:  Aler  CHEST/LUNG: Clear to auscultation bilaterally; No rales, rhonchi, wheezing  HEART: Regular rate and rhythm  ABDOMEN: Soft, Nontender, Nondistended; Bowel sounds present  EXTREMITIES:  No clubbing, cyanosis, calf tenderness or edema b/l      MEDICATIONS  (STANDING):  aspirin  chewable 81 milliGRAM(s) Oral daily  atorvastatin 80 milliGRAM(s) Oral at bedtime  cefpodoxime 200 milliGRAM(s) Oral every 12 hours  clopidogrel Tablet 75 milliGRAM(s) Oral daily  enoxaparin Injectable 40 milliGRAM(s) SubCutaneous every 24 hours  fluticasone furoate/umeclidinium/vilanterol 200-62.5-25 MICROgram(s) Inhaler 1 Puff(s) Inhalation daily  losartan 25 milliGRAM(s) Oral daily  polyethylene glycol 3350 17 Gram(s) Oral daily  QUEtiapine 12.5 milliGRAM(s) Oral <User Schedule>  senna 2 Tablet(s) Oral at bedtime    MEDICATIONS  (PRN):  acetaminophen     Tablet .. 650 milliGRAM(s) Oral every 6 hours PRN Mild Pain (1 - 3)  albuterol    90 MICROgram(s) HFA Inhaler 2 Puff(s) Inhalation every 6 hours PRN Shortness of Breath and/or Wheezing  lidocaine   4% Patch 1 Patch Transdermal daily PRN pain  QUEtiapine 12.5 milliGRAM(s) Oral daily PRN agitation        Allergies    No Known Allergies    Intolerances              LABS:                                      12.8   6.34  )-----------( 273      ( 21 Apr 2025 06:35 )             39.1     04-21    138  |  107  |  20  ----------------------------<  97  4.5   |  21[L]  |  0.90    Ca    9.0      21 Apr 2025 06:35    TPro  6.6  /  Alb  2.9[L]  /  TBili  0.9  /  DBili  x   /  AST  48[H]  /  ALT  37  /  AlkPhos  99  04-21    LIVER FUNCTIONS - ( 21 Apr 2025 06:35 )  Alb: 2.9 g/dL / Pro: 6.6 g/dL / ALK PHOS: 99 U/L / ALT: 37 U/L / AST: 48 U/L / GGT: x             Urinalysis Basic - ( 21 Apr 2025 06:35 )    Color: x / Appearance: x / SG: x / pH: x  Gluc: 97 mg/dL / Ketone: x  / Bili: x / Urobili: x   Blood: x / Protein: x / Nitrite: x   Leuk Esterase: x / RBC: x / WBC x   Sq Epi: x / Non Sq Epi: x / Bacteria: x          CAPILLARY BLOOD GLUCOSE                    RECENT CULTURES:          RADIOLOGY & ADDITIONAL TESTS:

## 2025-04-21 NOTE — DIETITIAN INITIAL EVALUATION ADULT - ORAL NUTRITION SUPPLEMENTS
Recommend Initiate Ensure Plus High Protein 8oz PO Daily (Provides 350kcal & 20grams of Protein)  Declines Nutrition Supplementation

## 2025-04-21 NOTE — DIETITIAN INITIAL EVALUATION ADULT - OTHER INFO
Initial Nutrition Assessment   74yr Old hellen   Denies Food Allergy/Intolerance  Tolerates Diet Well - No Chewing/Swallowing Complications (Per Patient)  Consumed 25-75% of Meals (as Per Documentation)  Stage 1 Pressure Ulcers on Gluteal Cleft (as Per Nursing Flow Sheets)  No Edema Noted (as Per Nursing Flow Sheets)  No Recent Nausea/Vomiting/Diarrhea/Constipation (as Per Patient)

## 2025-04-21 NOTE — PROGRESS NOTE ADULT - ASSESSMENT
74 year old female with PMH of  COPD, pneumothorax, collapsed lung, and TIAs who presented to the ED at [facility] on 4/16 complaining of severe right arm weakness. Per patient she woke up on  2 days prior to admit  with difficulty moving and lifting right arm. NIHSS was 5 in ED.  CTH showed high R parietal encephalomalacia and gliosis related likely to prior infarct, age indeterminate L CR, old BG L lacunar infarct, L thalamic lacunar infarct.  She was not a candidate for IV thrombolytics because she was out of the window. CTA was neg for LVO.  MRI brain this morning reveals acute L infarct in the corona radiate. She was placed on DAPT. Course complicated by elevated LFTs with workup unrevealing. She was evaluated for admission to acute inpatient rehab and admitted to [facility] on 4/18/25.       #Left Thalamic infarct now with dysarthria, right sided weakness, Gait Instability, ADL impairments and Functional impairments  #S/P Fall   -Comprehensive Rehab Program of PT/OT/SLP  -DAPT x 21 days and then aspirin 81mg daily  -CAtorvastatin 80 mg po daily   -Oral hygiene x 3 daily  -Right wrist and hand x ray (04/19)   - Lipid profile (04/19) 221/118/43/156  - A1C (04/19) 5.9     #UTI  - Vantin 200 mg po x 2 for 5 days. Change per C/S      #Agitation   - EKG--> QTC below 500   - Seroquel 12.5 at 8 pm  - Seroquel 12.5 mg po during day time PRN   -according to daughter, big change from baseline _> will order CT head   - Enhanced SV ordered         #HTN  -Losartan 25mg daily     #COPD  -Albuterol PRN  -Home med: trelegy daily - can restart on discharge - therapeutic interchange to Advair plus Spiriva while admitted     #Pain control  - Tylenol PRN    #GI/Bowel Mgmt   - Senna at bedtime   - Miralax daily     #Bladder management  - Continue to monitor PVR x1. Completed    - Voiding   - Scheduled voiding time q 4 hours while awake     #DVT prophylaxis   - Lovenox  - TEDs     #Skin:  - b/l blanchable redness heels, BUE ecchymosis, 6.5cm gluteal fissure , stage I pressure injury to gluteal cleft    FEN   - Diet - Regular with thins    - SLP treatment ongoing     Precautions / PROPHYLAXIS:   - Falls  - ortho: Weight bearing status: WBAT   - Lungs: Aspiration  - Pressure injury/Skin: OOB to Chair, PT/OT        Gela Carrillo  Neurology  [address] [place] [address], [place] [address]  Phone: [phone]  Fax: [phone]  Follow Up Time: 1 week    Mirlande Torres  [facility]  [facility] [address]  Scheduled Appointment: 06/25/2025

## 2025-04-21 NOTE — BH CONSULTATION LIAISON ASSESSMENT NOTE - DESCRIPTION (FIRST USE, LAST USE, QUANTITY, FREQUENCY, DURATION)
Reports being a former smoker; cannot recall when she last quit but states it was when "my lung collapsed." Previously was smoking 2 ppds.

## 2025-04-21 NOTE — PROGRESS NOTE ADULT - ASSESSMENT
74 year old female with PMH of  COPD, pneumothorax, collapsed lung, and TIAs who presented to the ED at [facility] on 4/16 complaining of severe right arm weakness. Per patient she woke up on  2 days prior to admit  with difficulty moving and lifting right arm. NIHSS was 5 in ED.  CTH showed high R parietal encephalomalacia and gliosis related likely to prior infarct, age indeterminate L CR, old BG L lacunar infarct, L thalamic lacunar infarct.  She was not a candidate for IV thrombolytics because she was out of the window. CTA was neg for LVO.  MRI brain this morning reveals acute L infarct in the corona radiate. She was placed on DAPT. Course complicated by elevated LFTs with workup unrevealing. She was evaluated for admission to acute inpatient rehab and admitted to [facility] on 4/18/25.     #metabolic encephalopathy, likely due to UTI  -mild elevated leukocytosis  -upon chart review, AOX3 upon admission, now confused/agitated  -UA positive for wbc and LE  -Started on vantin 200mg BID for 5 days for possible UTI  -Seroquel PRN agitation  -monitor cbc and fevers    #Left corona radiata infarct now with dysarthria, right sided weakness, Gait Instability, ADL impairments and Functional impairments  - Comprehensive Rehab Program  -Continue DAPT x 21 days and then aspirin 81mg daily  -Continue statin     #HTN  -Losartan 25mg daily     #COPD  -Continue albuterol PRN  -Home med: trelegy daily  -Advair plus Spiriva while admitted   -resume Trelegy upon discharge      #GI/Bowel Mgmt   - Continue Senna at bedtime   - Miralax daily       #DVT prophylaxis   - Lovenox

## 2025-04-21 NOTE — DIETITIAN INITIAL EVALUATION ADULT - ORAL INTAKE PTA/DIET HISTORY
Patient Does Not Follow Diet @Home  Consumes 2-3Meals a Day   Usually Cooks For Self  Does Take Vitamin/Supplements @Home

## 2025-04-21 NOTE — BH CONSULTATION LIAISON ASSESSMENT NOTE - DESCRIPTION
Patient previously ; but  later on in life and ex-spouse passed away. Currently lives in [place] with her son with a TBI. Has another daughter whom is a teacher. Has 2 grandchildren. 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

## 2025-04-21 NOTE — BH CONSULTATION LIAISON ASSESSMENT NOTE - DETAILS
Admits to low intensity passive suicidal ideation at times after her stroke; related to poor frustration tolerance from new onset deficits. Denies any current suicidal ideation.

## 2025-04-21 NOTE — BH CONSULTATION LIAISON ASSESSMENT NOTE - RISK ASSESSMENT
Risk factors include recent CVA, ongoing medical issues, hx of irritability/agitation, low intermittent passive suicidal ideation.     Protective factors include residential stability, strong support system, no previous psychiatric history, willingness to get better, no hx of violence/combativeness, no hx of suicide attempts.

## 2025-04-21 NOTE — BH CONSULTATION LIAISON ASSESSMENT NOTE - ORIENTATION
Believes it was April 28th; believes she was initially hospitalized at [facility] due to her lung collapsing (despite having a CVA). Patient was reoriented, but was still disoriented, referring back to being hospitalized on March 21st due to lung collapsing.

## 2025-04-21 NOTE — BH CONSULTATION LIAISON ASSESSMENT NOTE - NSBHCHARTREVIEWINVESTIGATE_PSY_A_CORE FT
< from: 12 Lead ECG (04.20.25 @ 16:55) >      Ventricular Rate 73 BPM    Atrial Rate 73 BPM    P-R Interval 146 ms    QRS Duration 74 ms    Q-T Interval 412 ms    QTC Calculation(Bazett) 453 ms    P Axis 25 degrees    R Axis -43 degrees    T Axis 38 degrees    Diagnosis Line Sinus rhythm with premature atrial complexes  Left axis deviation  Inferior infarct , age undetermined  Abnormal ECG  No previous ECGs available  Confirmed by Flaquito Herbert ([id number]) on 4/21/2025 1:59:03 PM    < end of copied text >      < from: MR Head No Cont (04.17.25 @ 10:13) >      ACC: [id number] EXAM:  MR BRAIN   ORDERED BY: DOMINIC GODINEZ     PROCEDURE DATE:  04/17/2025          INTERPRETATION:  EXAM: MRI OF THE BRAIN WITHOUT CONTRAST    HISTORY: Right arm weakness, evaluation for CVA    TECHNIQUE: Multi-planar multi-sequential MR imaging of the brain was   performed without intravenous contrast.    COMPARISON: CT/CTA of the head April 16, 2025.    FINDINGS:    Focus of diffusion restriction in the left corona radiata, compatible   with acute infarct. No hydrocephalus. Foci of increased T2/FLAIR signal   in the deep and periventricular white matter as well as within the yvonne,   compatible with chronic small vessel disease. Encephalomalacia   malacia/gliotic change in the right parietal lobe. Chronic infarct in the   leftcorona radiata/basal ganglia. Parenchymal volume loss. The   visualized extra axial spaces and basal cisterns are within normal   limits. No midline shift or mass effect present.    The craniocervical junction is within normal limits. The sella is largely   CSF filled. The major intracranial vessels demonstrate the expected   signal void related to vascular flow. Mucous retention cysts in the   bilateral maxillary sinuses. The mastoid air cells are well aerated. The   visualized orbits are withinnormal limits.      IMPRESSION:    1.  Acute infarct in the left corona radiata.  2.  Chronic ischemic changes as discussed above.    --- End of Report ---            JODI RENDON MD; Attending Radiologist  This document has been electronically signed. Apr 17 2025 10:24AM    < end of copied text >

## 2025-04-21 NOTE — DIETITIAN INITIAL EVALUATION ADULT - PERTINENT LABORATORY DATA
04-21    138  |  107  |  20  ----------------------------<  97  4.5   |  21[L]  |  0.90    Ca    9.0      21 Apr 2025 06:35    TPro  6.6  /  Alb  2.9[L]  /  TBili  0.9  /  DBili  x   /  AST  48[H]  /  ALT  37  /  AlkPhos  99  04-21  A1C with Estimated Average Glucose Result: 5.9 % (04-17-25 @ 07:27)  A1C with Estimated Average Glucose Result: 5.8 % (03-17-25 @ 07:14)

## 2025-04-22 PROCEDURE — 99232 SBSQ HOSP IP/OBS MODERATE 35: CPT

## 2025-04-22 PROCEDURE — 99232 SBSQ HOSP IP/OBS MODERATE 35: CPT | Mod: GC

## 2025-04-22 RX ADMIN — QUETIAPINE FUMARATE 12.5 MILLIGRAM(S): 25 TABLET ORAL at 11:20

## 2025-04-22 RX ADMIN — CEFPODOXIME PROXETIL 200 MILLIGRAM(S): 200 TABLET, FILM COATED ORAL at 05:33

## 2025-04-22 RX ADMIN — Medication 2 TABLET(S): at 21:35

## 2025-04-22 RX ADMIN — Medication 81 MILLIGRAM(S): at 11:20

## 2025-04-22 RX ADMIN — ENOXAPARIN SODIUM 40 MILLIGRAM(S): 100 INJECTION SUBCUTANEOUS at 07:51

## 2025-04-22 RX ADMIN — LOSARTAN POTASSIUM 25 MILLIGRAM(S): 100 TABLET, FILM COATED ORAL at 05:33

## 2025-04-22 RX ADMIN — ATORVASTATIN CALCIUM 80 MILLIGRAM(S): 80 TABLET, FILM COATED ORAL at 21:35

## 2025-04-22 RX ADMIN — CLOPIDOGREL BISULFATE 75 MILLIGRAM(S): 75 TABLET, FILM COATED ORAL at 11:20

## 2025-04-22 RX ADMIN — QUETIAPINE FUMARATE 12.5 MILLIGRAM(S): 25 TABLET ORAL at 21:34

## 2025-04-22 RX ADMIN — CEFPODOXIME PROXETIL 200 MILLIGRAM(S): 200 TABLET, FILM COATED ORAL at 17:09

## 2025-04-22 NOTE — PROGRESS NOTE ADULT - SUBJECTIVE AND OBJECTIVE BOX
HPI:  74 year old female with PMH of  COPD, pneumothorax, collapsed lung, and TIAs who presented to the ED at [place] on 4/16 complaining of severe right arm weakness. Per patient she woke up on  2 days prior to admit  with difficulty moving and lifting right arm. NIHSS was 5 in ED.  CTH showed high R parietal encephalomalacia and gliosis related likely to prior infarct, age indeterminate L CR, old BG L lacunar infarct, L thalamic lacunar infarct.  She was not a candidate for IV thrombolytics because she was out of the window. CTA was neg for LVO.  MRI brain this morning reveals acute L infarct in the corona radiate. She was placed on DAPT. Course complicated by elevated LFTs with workup unrevealing. She was evaluated for admission to acute inpatient rehab and admitted to [facility] on 4/18/25.  (18 Apr 2025 14:01)      Subjective:  - Patient was seen and examined in chair, irritable  - reports poor sleep, on Seroquel per psych  - Denies any pain.   - denies dysuria, completed abx for UTI, afebrile  - Tolerating therapy, tolerating DAPT+Lovenox-no bleeding reported    ROS:  - Denies CP, palpitation, SOB, cough, fever, chills, headache, dizziness, visual changes, abdominal pain, N/V/D/C, dysuria, hematuria, (+) joint pain, agitation      MEDICATIONS  (STANDING):  aspirin  chewable 81 milliGRAM(s) Oral daily  atorvastatin 80 milliGRAM(s) Oral at bedtime  cefpodoxime 200 milliGRAM(s) Oral every 12 hours  clopidogrel Tablet 75 milliGRAM(s) Oral daily  enoxaparin Injectable 40 milliGRAM(s) SubCutaneous every 24 hours  fluticasone furoate/umeclidinium/vilanterol 200-62.5-25 MICROgram(s) Inhaler 1 Puff(s) Inhalation daily  losartan 25 milliGRAM(s) Oral daily  pantoprazole    Tablet 40 milliGRAM(s) Oral before breakfast  polyethylene glycol 3350 17 Gram(s) Oral daily  QUEtiapine 12.5 milliGRAM(s) Oral <User Schedule>  senna 2 Tablet(s) Oral at bedtime    MEDICATIONS  (PRN):  acetaminophen     Tablet .. 650 milliGRAM(s) Oral every 6 hours PRN Mild Pain (1 - 3)  albuterol    90 MICROgram(s) HFA Inhaler 2 Puff(s) Inhalation every 6 hours PRN Shortness of Breath and/or Wheezing  lidocaine   4% Patch 1 Patch Transdermal daily PRN pain  QUEtiapine 12.5 milliGRAM(s) Oral daily PRN agitation                            12.8   6.34  )-----------( 273      ( 21 Apr 2025 06:35 )             39.1     04-21    138  |  107  |  20  ----------------------------<  97  4.5   |  21[L]  |  0.90    Ca    9.0      21 Apr 2025 06:35    TPro  6.6  /  Alb  2.9[L]  /  TBili  0.9  /  DBili  x   /  AST  48[H]  /  ALT  37  /  AlkPhos  99  04-21    Urinalysis Basic - ( 21 Apr 2025 06:35 )    Color: x / Appearance: x / SG: x / pH: x  Gluc: 97 mg/dL / Ketone: x  / Bili: x / Urobili: x   Blood: x / Protein: x / Nitrite: x   Leuk Esterase: x / RBC: x / WBC x   Sq Epi: x / Non Sq Epi: x / Bacteria: x      Vital Signs Last 24 Hrs  T(C): 37.1 (22 Apr 2025 07:52), Max: 37.1 (22 Apr 2025 07:52)  T(F): 98.7 (22 Apr 2025 07:52), Max: 98.7 (22 Apr 2025 07:52)  HR: 64 (22 Apr 2025 07:52) (64 - 85)  BP: 116/72 (22 Apr 2025 07:52) (116/72 - 117/72)  BP(mean): --  RR: 16 (22 Apr 2025 07:52) (15 - 16)  SpO2: 97% (22 Apr 2025 07:52) (93% - 97%)    Parameters below as of 22 Apr 2025 07:52  Patient On (Oxygen Delivery Method): room air      Gen - NAD in chair, not cooperative w/exam  Neuro-     Cognitive - awake, alert     Communication - Fluent     Memory: refused     Cranial Nerves -refused     Motor - Right hemiparesis                    LEFT    UE - ShAB 5/5, EF 5/5, EE 5/5,  5/5                    RIGHT UE - ShAB 1/5, EF 2/5, EE 2/5,   1/5                    LEFT    LE - HF 4/5, KE 5/5, DF 5/5, PF 5/5                    RIGHT LE - HF 4/5, KE 4/5, DF 2/5, PF 3/5        Sensory - Intact  to LT     Coordination - impaired      Tone - Normal  Psychiatric - Mood irritable      Continue comprehensive acute rehab program consisting of 3hrs/day of OT/PT and SLP.

## 2025-04-22 NOTE — PROGRESS NOTE ADULT - SUBJECTIVE AND OBJECTIVE BOX
CC: Patient is a 74y old  Female who presents with a chief complaint of Left thalamic ischemic stroke with right hemiparesis    Interval History:  Patient seen and examined at bedside.  No overnight events  No complaints this morning  Vitally stable    ALLERGIES:  No Known Allergies    MEDICATIONS  (STANDING):  aspirin  chewable 81 milliGRAM(s) Oral daily  atorvastatin 80 milliGRAM(s) Oral at bedtime  cefpodoxime 200 milliGRAM(s) Oral every 12 hours  clopidogrel Tablet 75 milliGRAM(s) Oral daily  enoxaparin Injectable 40 milliGRAM(s) SubCutaneous every 24 hours  fluticasone furoate/umeclidinium/vilanterol 200-62.5-25 MICROgram(s) Inhaler 1 Puff(s) Inhalation daily  losartan 25 milliGRAM(s) Oral daily  polyethylene glycol 3350 17 Gram(s) Oral daily  QUEtiapine 12.5 milliGRAM(s) Oral <User Schedule>  senna 2 Tablet(s) Oral at bedtime    MEDICATIONS  (PRN):  acetaminophen     Tablet .. 650 milliGRAM(s) Oral every 6 hours PRN Mild Pain (1 - 3)  albuterol    90 MICROgram(s) HFA Inhaler 2 Puff(s) Inhalation every 6 hours PRN Shortness of Breath and/or Wheezing  lidocaine   4% Patch 1 Patch Transdermal daily PRN pain  QUEtiapine 12.5 milliGRAM(s) Oral daily PRN agitation    Vital Signs Last 24 Hrs  T(F): 98.7 (22 Apr 2025 07:52), Max: 98.7 (22 Apr 2025 07:52)  HR: 64 (22 Apr 2025 07:52) (64 - 85)  BP: 116/72 (22 Apr 2025 07:52) (116/72 - 117/72)  RR: 16 (22 Apr 2025 07:52) (15 - 16)  SpO2: 97% (22 Apr 2025 07:52) (93% - 97%)  I&O's Summary    BMI (kg/m2): 20.2 (04-18-25 @ 16:49)    PHYSICAL EXAM:  GENERAL: NAD  NERVOUS SYSTEM:  RUE weakness  CHEST/LUNG: Clear to percussion bilaterally; No rales, rhonchi, wheezing, or rubs; normal respiratory effort, no intercostal retractions  HEART: Regular rate and rhythm; No murmurs, rubs, or gallops; No pitting edema  ABDOMEN: Soft, Nontender, Nondistended; Bowel sounds present; No HSM or masses  MUSCULOSKELETAL/EXTREMITIES:  2+ Peripheral Pulses, No clubbing or digital cyanosis    LABS:                        12.8   6.34  )-----------( 273      ( 21 Apr 2025 06:35 )             39.1       04-21    138  |  107  |  20  ----------------------------<  97  4.5   |  21  |  0.90    Ca    9.0      21 Apr 2025 06:35    TPro  6.6  /  Alb  2.9  /  TBili  0.9  /  DBili  x   /  AST  48  /  ALT  37  /  AlkPhos  99  04-21 04-17 Chol 221 mg/dL LDL -- HDL 43 mg/dL Trig 118 mg/dL                  Urinalysis Basic - ( 21 Apr 2025 06:35 )    Color: x / Appearance: x / SG: x / pH: x  Gluc: 97 mg/dL / Ketone: x  / Bili: x / Urobili: x   Blood: x / Protein: x / Nitrite: x   Leuk Esterase: x / RBC: x / WBC x   Sq Epi: x / Non Sq Epi: x / Bacteria: x        Culture - Urine (collected 19 Apr 2025 18:00)  Source: Clean Catch Clean Catch (Midstream)  Final Report (21 Apr 2025 21:44):    <10,000 CFU/mL Normal Urogenital Nunu      COVID-19 PCR: NotDetec (04-18-25 @ 19:10)      Care Discussed with Consultants/Other Providers: Yes

## 2025-04-22 NOTE — PROGRESS NOTE ADULT - ASSESSMENT
74 year old female with PMH of  COPD, pneumothorax, collapsed lung, and TIAs who presented to the ED at [place] on 4/16 complaining of severe right arm weakness. Per patient she woke up on  2 days prior to admit  with difficulty moving and lifting right arm. NIHSS was 5 in ED.  CTH showed high R parietal encephalomalacia and gliosis related likely to prior infarct, age indeterminate L CR, old BG L lacunar infarct, L thalamic lacunar infarct.  She was not a candidate for IV thrombolytics because she was out of the window. CTA was neg for LVO.  MRI brain this morning reveals acute L infarct in the corona radiate. She was placed on DAPT. Course complicated by elevated LFTs with workup unrevealing. She was evaluated for admission to acute inpatient rehab and admitted to [facility] on 4/18/25.     #metabolic encephalopathy, suspected due to UTI  -mild elevated leukocytosis - resolved   -Started on Vantin 200mg BID for 5 days for possible UTI > to be completed tomorrow 4/23  -UCX no growth  -Seroquel PRN agitation    #Left corona radiata infarct now with dysarthria, right sided weakness, Gait Instability, ADL impairments and Functional impairments  - Comprehensive Rehab Program  -Continue DAPT x 21 days and then aspirin 81mg daily  -Continue statin     #HTN  -Losartan 25mg daily     #COPD  -Continue albuterol PRN  -Home med: trelegy daily  -c/w Trelegy Ellipta   -resume Trelegy upon discharge      #GI/Bowel Mgmt   - Continue Senna at bedtime   - Miralax daily       #DVT prophylaxis   - Lovenox    # GI ppx:  - PPI

## 2025-04-22 NOTE — PROGRESS NOTE ADULT - ASSESSMENT
74 year old female with PMH of  COPD, pneumothorax, collapsed lung, and TIAs who presented to the ED at [place] on 4/16 complaining of severe right arm weakness. Per patient she woke up on  2 days prior to admit  with difficulty moving and lifting right arm. NIHSS was 5 in ED.  CTH showed high R parietal encephalomalacia and gliosis related likely to prior infarct, age indeterminate L CR, old BG L lacunar infarct, L thalamic lacunar infarct.  She was not a candidate for IV thrombolytics because she was out of the window. CTA was neg for LVO.  MRI brain this morning reveals acute L infarct in the corona radiate. She was placed on DAPT. Course complicated by elevated LFTs with workup unrevealing. She was evaluated for admission to acute inpatient rehab and admitted to [facility] on 4/18/25.       #Left Thalamic infarct now with dysarthria, right sided weakness, Gait Instability, ADL impairments and Functional impairments  #S/P Fall   -Comprehensive Rehab Program of PT/OT/SLP  -DAPT x 21 days and then aspirin 81mg daily  - Atorvastatin 80 mg po daily LDL goal <70  - A1C (04/19) 5.9     #UTI  - Vantin 200 mg po x 2 for 5 days completed    #Agitation   - EKG--> QTC below 500   - Seroquel 12.5 at 8 pm  - Seroquel 12.5 mg po during day time PRN   -according to daughter, big change from baseline- neg acute CT head   - Enhanced SV ordered         #HTN  -Losartan 25mg daily     #COPD  -Albuterol PRN  -Home med: trelegy daily - can restart on discharge - therapeutic interchange to Advair plus Spiriva while admitted     #Pain control  - Tylenol PRN    #GI/Bowel Mgmt   - Senna at bedtime   - Miralax daily     #DVT prophylaxis   - Lovenox    #Skin:  - b/l blanchable redness heels, BUE ecchymosis, 6.5cm gluteal fissure , stage I pressure injury to gluteal cleft    FEN   - Diet - Regular with thins    - SLP treatment ongoing     Precautions / PROPHYLAXIS:   - Falls  - ortho: Weight bearing status: WBAT   - Lungs: Aspiration  - Pressure injury/Skin: OOB to Chair, PT/OT        Gela Carrillo  Neurology  [address], [address]  [place] [address]  Phone: [phone]  Fax: [phone]  Follow Up Time: 1 week    Mirlande Torres  [facility]  [facility] [address]  Scheduled Appointment: 06/25/2025

## 2025-04-23 PROCEDURE — 99232 SBSQ HOSP IP/OBS MODERATE 35: CPT

## 2025-04-23 PROCEDURE — 99232 SBSQ HOSP IP/OBS MODERATE 35: CPT | Mod: GC

## 2025-04-23 RX ADMIN — CEFPODOXIME PROXETIL 200 MILLIGRAM(S): 200 TABLET, FILM COATED ORAL at 06:41

## 2025-04-23 RX ADMIN — CLOPIDOGREL BISULFATE 75 MILLIGRAM(S): 75 TABLET, FILM COATED ORAL at 12:15

## 2025-04-23 RX ADMIN — CEFPODOXIME PROXETIL 200 MILLIGRAM(S): 200 TABLET, FILM COATED ORAL at 17:13

## 2025-04-23 RX ADMIN — ENOXAPARIN SODIUM 40 MILLIGRAM(S): 100 INJECTION SUBCUTANEOUS at 06:41

## 2025-04-23 RX ADMIN — QUETIAPINE FUMARATE 12.5 MILLIGRAM(S): 25 TABLET ORAL at 21:07

## 2025-04-23 RX ADMIN — LOSARTAN POTASSIUM 25 MILLIGRAM(S): 100 TABLET, FILM COATED ORAL at 06:42

## 2025-04-23 RX ADMIN — Medication 81 MILLIGRAM(S): at 12:16

## 2025-04-23 RX ADMIN — Medication 2 TABLET(S): at 21:06

## 2025-04-23 RX ADMIN — FLUTICASONE FUROATE, UMECLIDINIUM BROMIDE AND VILANTEROL TRIFENATATE 1 PUFF(S): 100; 62.5; 25 POWDER RESPIRATORY (INHALATION) at 17:36

## 2025-04-23 NOTE — PROGRESS NOTE ADULT - ASSESSMENT
74 year old female with PMH of  COPD, pneumothorax, collapsed lung, and TIAs who presented to the ED at [place] on 4/16 complaining of severe right arm weakness. Per patient she woke up on  2 days prior to admit  with difficulty moving and lifting right arm. NIHSS was 5 in ED.  CTH showed high R parietal encephalomalacia and gliosis related likely to prior infarct, age indeterminate L CR, old BG L lacunar infarct, L thalamic lacunar infarct.  She was not a candidate for IV thrombolytics because she was out of the window. CTA was neg for LVO.  MRI brain this morning reveals acute L infarct in the corona radiate. She was placed on DAPT. Course complicated by elevated LFTs with workup unrevealing. She was evaluated for admission to acute inpatient rehab and admitted to [facility] on 4/18/25.       #Left Thalamic infarct now with dysarthria, right sided weakness, Gait Instability, ADL impairments and Functional impairments  #S/P Fall   - Comprehensive Rehab Program of PT/OT/SLP  - DAPT x 21 days and then aspirin 81mg daily  - Atorvastatin 80 mg po daily LDL goal <70  - A1C (04/19) 5.9     #UTI  - on abx    #Agitation   - EKG--> QTC below 500   - Seroquel 12.5 at 8 pm  - Seroquel 12.5 mg po during day time PRN   - neg acute CT head   - Enhanced SV ordered         #HTN  -Losartan 25mg daily     #COPD  -Albuterol PRN  -Home med: trelegy daily- therapeutic interchange to Advair plus Spiriva while admitted     #Pain control  - Tylenol PRN    #GI/Bowel Mgmt   - Senna at bedtime   - Miralax daily     #DVT prophylaxis   - Lovenox    #Skin:  - b/l blanchable redness heels, BUE ecchymosis, 6.5cm gluteal fissure , stage I pressure injury to gluteal cleft  - allevyn    FEN   - Diet - Regular with thins    - SLP treatment ongoing     Precautions / PROPHYLAXIS:   - Falls  - ortho: Weight bearing status: WBAT   - Lungs: Aspiration  - Pressure injury/Skin: OOB to Chair, PT/OT        Gela Carrillo  Neurology  [address], [address]  [place] [address]  Phone: [phone]  Fax: [phone]  Follow Up Time: 1 week    Lone, Mirlande A  [facility]  [facility] [address]  Scheduled Appointment: 06/25/2025

## 2025-04-23 NOTE — PROGRESS NOTE ADULT - SUBJECTIVE AND OBJECTIVE BOX
Patient is a 74y old  Female who presents with a chief complaint of Left thalamic ischemic stroke with right hemiparesis     Reports has not eaten breakfast in days; does not like choices  Patient seen and examined at bedside.    ALLERGIES:  No Known Allergies    MEDICATIONS  (STANDING):  aspirin  chewable 81 milliGRAM(s) Oral daily  atorvastatin 80 milliGRAM(s) Oral at bedtime  cefpodoxime 200 milliGRAM(s) Oral every 12 hours  clopidogrel Tablet 75 milliGRAM(s) Oral daily  enoxaparin Injectable 40 milliGRAM(s) SubCutaneous every 24 hours  fluticasone furoate/umeclidinium/vilanterol 200-62.5-25 MICROgram(s) Inhaler 1 Puff(s) Inhalation daily  losartan 25 milliGRAM(s) Oral daily  pantoprazole    Tablet 40 milliGRAM(s) Oral before breakfast  polyethylene glycol 3350 17 Gram(s) Oral daily  QUEtiapine 12.5 milliGRAM(s) Oral <User Schedule>  senna 2 Tablet(s) Oral at bedtime    MEDICATIONS  (PRN):  acetaminophen     Tablet .. 650 milliGRAM(s) Oral every 6 hours PRN Mild Pain (1 - 3)  albuterol    90 MICROgram(s) HFA Inhaler 2 Puff(s) Inhalation every 6 hours PRN Shortness of Breath and/or Wheezing  lidocaine   4% Patch 1 Patch Transdermal daily PRN pain  QUEtiapine 12.5 milliGRAM(s) Oral daily PRN agitation    Vital Signs Last 24 Hrs  T(F): 98.5 (23 Apr 2025 07:12), Max: 98.6 (22 Apr 2025 20:18)  HR: 84 (23 Apr 2025 07:12) (73 - 84)  BP: 152/75 (23 Apr 2025 07:12) (145/78 - 160/90)  RR: 16 (23 Apr 2025 07:12) (16 - 17)  SpO2: 93% (23 Apr 2025 07:12) (93% - 97%)  I&O's Summary    BMI (kg/m2): 20.2 (04-18-25 @ 16:49)  PHYSICAL EXAM:  General: NAD, A/O   ENT: MMM, no scleral icterus  Neck: Supple, No JVD, no thyroidomegaly  Lungs: Clear to auscultation bilaterally, no wheezes, no rales, no rhonchi, good inspiratory effort  Cardio: RRR, S1/S2, No murmurs  Abdomen: Soft, Nontender, Nondistended; Bowel sounds present  Extremities: No calf tenderness, No pitting edema, no skin changes    LABS:                        12.8   6.34  )-----------( 273      ( 21 Apr 2025 06:35 )             39.1       04-21    138  |  107  |  20  ----------------------------<  97  4.5   |  21  |  0.90    Ca    9.0      21 Apr 2025 06:35    TPro  6.6  /  Alb  2.9  /  TBili  0.9  /  DBili  x   /  AST  48  /  ALT  37  /  AlkPhos  99  04-21     04-17 Chol 221 mg/dL LDL -- HDL 43 mg/dL Trig 118 mg/dL    rinalysis Basic - ( 21 Apr 2025 06:35 )    Color: x / Appearance: x / SG: x / pH: x  Gluc: 97 mg/dL / Ketone: x  / Bili: x / Urobili: x   Blood: x / Protein: x / Nitrite: x   Leuk Esterase: x / RBC: x / WBC x   Sq Epi: x / Non Sq Epi: x / Bacteria: x    Culture - Urine (collected 19 Apr 2025 18:00)  Source: Clean Catch Clean Catch (Midstream)  Final Report (21 Apr 2025 21:44):    <10,000 CFU/mL Normal Urogenital Nunu      COVID-19 PCR: NotDetec (04-18-25 @ 19:10)  COVID-19 PCR: NotDetec (04-18-25 @ 19:10)

## 2025-04-23 NOTE — PROGRESS NOTE ADULT - ASSESSMENT
74 year old female with PMH of  COPD, pneumothorax, collapsed lung, and TIAs who presented to the ED at [place] on 4/16 complaining of severe right arm weakness. Per patient she woke up on  2 days prior to admit  with difficulty moving and lifting right arm. NIHSS was 5 in ED.  CTH showed high R parietal encephalomalacia and gliosis related likely to prior infarct, age indeterminate L CR, old BG L lacunar infarct, L thalamic lacunar infarct.  She was not a candidate for IV thrombolytics because she was out of the window. CTA was neg for LVO.  MRI brain this morning reveals acute L infarct in the corona radiate. She was placed on DAPT. Course complicated by elevated LFTs with workup unrevealing. She was evaluated for admission to acute inpatient rehab and admitted to [facility] on 4/18/25.     #metabolic encephalopathy, suspected due to UTI  -mild elevated leukocytosis - resolved   -Started on Vantin 200mg BID for 5 days for possible UTI > to be completed today 4/23  -UCX no growth  -Seroquel PRN agitation    #Left corona radiata infarct now with dysarthria, right sided weakness  -Continue DAPT x 21 days and then aspirin 81mg daily  -Continue statin     #HTN  -Losartan 25mg daily     #COPD  -Continue albuterol PRN  -Home med: trelegy daily  -c/w Trelegy Ellipta   -resume Trelegy upon discharge    DVT prophylaxis - Lovenox

## 2025-04-23 NOTE — PROGRESS NOTE ADULT - SUBJECTIVE AND OBJECTIVE BOX
HPI:  74 year old female with PMH of  COPD, pneumothorax, collapsed lung, and TIAs who presented to the ED at [place] on 4/16 complaining of severe right arm weakness. Per patient she woke up on  2 days prior to admit  with difficulty moving and lifting right arm. NIHSS was 5 in ED.  CTH showed high R parietal encephalomalacia and gliosis related likely to prior infarct, age indeterminate L CR, old BG L lacunar infarct, L thalamic lacunar infarct.  She was not a candidate for IV thrombolytics because she was out of the window. CTA was neg for LVO.  MRI brain this morning reveals acute L infarct in the corona radiate. She was placed on DAPT. Course complicated by elevated LFTs with workup unrevealing. She was evaluated for admission to acute inpatient rehab and admitted to [facility] on 4/18/25.  (18 Apr 2025 14:01)      Subjective:  - Patient was seen and examined in chair, irritable  - reports poor sleep, on Seroquel per psych  - Denies any pain.   - denies dysuria, abx for UTI, afebrile  - Tolerating therapy, tolerating DAPT+Lovenox-no bleeding reported    ROS:  - Denies CP, palpitation, SOB, cough, fever, chills, headache, dizziness, visual changes, abdominal pain, N/V/D/C, dysuria, hematuria, (+) joint pain, agitation      MEDICATIONS  (STANDING):  aspirin  chewable 81 milliGRAM(s) Oral daily  atorvastatin 80 milliGRAM(s) Oral at bedtime  cefpodoxime 200 milliGRAM(s) Oral every 12 hours  clopidogrel Tablet 75 milliGRAM(s) Oral daily  enoxaparin Injectable 40 milliGRAM(s) SubCutaneous every 24 hours  fluticasone furoate/umeclidinium/vilanterol 200-62.5-25 MICROgram(s) Inhaler 1 Puff(s) Inhalation daily  losartan 25 milliGRAM(s) Oral daily  pantoprazole    Tablet 40 milliGRAM(s) Oral before breakfast  polyethylene glycol 3350 17 Gram(s) Oral daily  QUEtiapine 12.5 milliGRAM(s) Oral <User Schedule>  senna 2 Tablet(s) Oral at bedtime    MEDICATIONS  (PRN):  acetaminophen     Tablet .. 650 milliGRAM(s) Oral every 6 hours PRN Mild Pain (1 - 3)  albuterol    90 MICROgram(s) HFA Inhaler 2 Puff(s) Inhalation every 6 hours PRN Shortness of Breath and/or Wheezing  lidocaine   4% Patch 1 Patch Transdermal daily PRN pain  QUEtiapine 12.5 milliGRAM(s) Oral daily PRN agitation      Vital Signs Last 24 Hrs  T(C): 36.9 (23 Apr 2025 07:12), Max: 37 (22 Apr 2025 20:18)  T(F): 98.5 (23 Apr 2025 07:12), Max: 98.6 (22 Apr 2025 20:18)  HR: 84 (23 Apr 2025 07:12) (73 - 84)  BP: 152/75 (23 Apr 2025 07:12) (145/78 - 160/90)  BP(mean): --  RR: 16 (23 Apr 2025 07:12) (16 - 17)  SpO2: 93% (23 Apr 2025 07:12) (93% - 97%)    Parameters below as of 23 Apr 2025 07:12  Patient On (Oxygen Delivery Method): room air      Gen - NAD in chair, not cooperative w/exam  Neuro-     Cognitive - awake, alert     Communication - Fluent     Memory: refused     Cranial Nerves -refused     Motor - Right hemiparesis                    LEFT    UE - ShAB 5/5, EF 5/5, EE 5/5,  5/5                    RIGHT UE - ShAB 1/5, EF 2/5, EE 2/5,   1/5                    LEFT    LE - HF 4/5, KE 5/5, DF 5/5, PF 5/5                    RIGHT LE - HF 4/5, KE 4/5, DF 2/5, PF 3/5        Sensory - Intact  to LT     Coordination - impaired      Tone - Normal  Psychiatric - Mood irritable        Continue comprehensive acute rehab program consisting of 3hrs/day of OT/PT and SLP.

## 2025-04-24 LAB
ALBUMIN SERPL ELPH-MCNC: 2.8 G/DL — LOW (ref 3.3–5)
ALP SERPL-CCNC: 104 U/L — SIGNIFICANT CHANGE UP (ref 40–120)
ALT FLD-CCNC: 45 U/L — SIGNIFICANT CHANGE UP (ref 10–45)
ANION GAP SERPL CALC-SCNC: 9 MMOL/L — SIGNIFICANT CHANGE UP (ref 5–17)
AST SERPL-CCNC: 33 U/L — SIGNIFICANT CHANGE UP (ref 10–40)
BASOPHILS # BLD AUTO: 0.04 K/UL — SIGNIFICANT CHANGE UP (ref 0–0.2)
BASOPHILS NFR BLD AUTO: 0.7 % — SIGNIFICANT CHANGE UP (ref 0–2)
BILIRUB SERPL-MCNC: 0.8 MG/DL — SIGNIFICANT CHANGE UP (ref 0.2–1.2)
BUN SERPL-MCNC: 17 MG/DL — SIGNIFICANT CHANGE UP (ref 7–23)
CALCIUM SERPL-MCNC: 8.6 MG/DL — SIGNIFICANT CHANGE UP (ref 8.4–10.5)
CHLORIDE SERPL-SCNC: 105 MMOL/L — SIGNIFICANT CHANGE UP (ref 96–108)
CO2 SERPL-SCNC: 23 MMOL/L — SIGNIFICANT CHANGE UP (ref 22–31)
CREAT SERPL-MCNC: 0.81 MG/DL — SIGNIFICANT CHANGE UP (ref 0.5–1.3)
EGFR: 77 ML/MIN/1.73M2 — SIGNIFICANT CHANGE UP
EGFR: 77 ML/MIN/1.73M2 — SIGNIFICANT CHANGE UP
EOSINOPHIL # BLD AUTO: 0.11 K/UL — SIGNIFICANT CHANGE UP (ref 0–0.5)
EOSINOPHIL NFR BLD AUTO: 1.9 % — SIGNIFICANT CHANGE UP (ref 0–6)
GLUCOSE SERPL-MCNC: 139 MG/DL — HIGH (ref 70–99)
HCT VFR BLD CALC: 34.6 % — SIGNIFICANT CHANGE UP (ref 34.5–45)
HGB BLD-MCNC: 11.5 G/DL — SIGNIFICANT CHANGE UP (ref 11.5–15.5)
IMM GRANULOCYTES NFR BLD AUTO: 0.5 % — SIGNIFICANT CHANGE UP (ref 0–0.9)
LYMPHOCYTES # BLD AUTO: 0.91 K/UL — LOW (ref 1–3.3)
LYMPHOCYTES # BLD AUTO: 15.8 % — SIGNIFICANT CHANGE UP (ref 13–44)
MCHC RBC-ENTMCNC: 30 PG — SIGNIFICANT CHANGE UP (ref 27–34)
MCHC RBC-ENTMCNC: 33.2 G/DL — SIGNIFICANT CHANGE UP (ref 32–36)
MCV RBC AUTO: 90.3 FL — SIGNIFICANT CHANGE UP (ref 80–100)
MONOCYTES # BLD AUTO: 0.71 K/UL — SIGNIFICANT CHANGE UP (ref 0–0.9)
MONOCYTES NFR BLD AUTO: 12.3 % — SIGNIFICANT CHANGE UP (ref 2–14)
NEUTROPHILS # BLD AUTO: 3.95 K/UL — SIGNIFICANT CHANGE UP (ref 1.8–7.4)
NEUTROPHILS NFR BLD AUTO: 68.8 % — SIGNIFICANT CHANGE UP (ref 43–77)
NRBC BLD AUTO-RTO: 0 /100 WBCS — SIGNIFICANT CHANGE UP (ref 0–0)
PLATELET # BLD AUTO: 282 K/UL — SIGNIFICANT CHANGE UP (ref 150–400)
POTASSIUM SERPL-MCNC: 3.3 MMOL/L — LOW (ref 3.5–5.3)
POTASSIUM SERPL-SCNC: 3.3 MMOL/L — LOW (ref 3.5–5.3)
PROT SERPL-MCNC: 6.3 G/DL — SIGNIFICANT CHANGE UP (ref 6–8.3)
RBC # BLD: 3.83 M/UL — SIGNIFICANT CHANGE UP (ref 3.8–5.2)
RBC # FLD: 13 % — SIGNIFICANT CHANGE UP (ref 10.3–14.5)
SODIUM SERPL-SCNC: 137 MMOL/L — SIGNIFICANT CHANGE UP (ref 135–145)
WBC # BLD: 5.75 K/UL — SIGNIFICANT CHANGE UP (ref 3.8–10.5)
WBC # FLD AUTO: 5.75 K/UL — SIGNIFICANT CHANGE UP (ref 3.8–10.5)

## 2025-04-24 PROCEDURE — 99232 SBSQ HOSP IP/OBS MODERATE 35: CPT

## 2025-04-24 PROCEDURE — 99232 SBSQ HOSP IP/OBS MODERATE 35: CPT | Mod: GC

## 2025-04-24 RX ADMIN — LOSARTAN POTASSIUM 25 MILLIGRAM(S): 100 TABLET, FILM COATED ORAL at 06:07

## 2025-04-24 RX ADMIN — CLOPIDOGREL BISULFATE 75 MILLIGRAM(S): 75 TABLET, FILM COATED ORAL at 12:18

## 2025-04-24 RX ADMIN — ENOXAPARIN SODIUM 40 MILLIGRAM(S): 100 INJECTION SUBCUTANEOUS at 07:08

## 2025-04-24 RX ADMIN — Medication 81 MILLIGRAM(S): at 12:17

## 2025-04-24 RX ADMIN — QUETIAPINE FUMARATE 12.5 MILLIGRAM(S): 25 TABLET ORAL at 21:09

## 2025-04-24 RX ADMIN — Medication 2 TABLET(S): at 21:08

## 2025-04-24 RX ADMIN — Medication 40 MILLIEQUIVALENT(S): at 17:27

## 2025-04-24 RX ADMIN — Medication 40 MILLIGRAM(S): at 06:08

## 2025-04-24 RX ADMIN — Medication 40 MILLIEQUIVALENT(S): at 14:22

## 2025-04-24 RX ADMIN — ATORVASTATIN CALCIUM 80 MILLIGRAM(S): 80 TABLET, FILM COATED ORAL at 21:08

## 2025-04-24 RX ADMIN — CEFPODOXIME PROXETIL 200 MILLIGRAM(S): 200 TABLET, FILM COATED ORAL at 06:07

## 2025-04-24 RX ADMIN — FLUTICASONE FUROATE, UMECLIDINIUM BROMIDE AND VILANTEROL TRIFENATATE 1 PUFF(S): 100; 62.5; 25 POWDER RESPIRATORY (INHALATION) at 08:02

## 2025-04-24 NOTE — PROGRESS NOTE ADULT - SUBJECTIVE AND OBJECTIVE BOX
HPI:  74 year old female with PMH of  COPD, pneumothorax, collapsed lung, and TIAs who presented to the ED at [facility] on 4/16 complaining of severe right arm weakness. Per patient she woke up on  2 days prior to admit  with difficulty moving and lifting right arm. NIHSS was 5 in ED.  CTH showed high R parietal encephalomalacia and gliosis related likely to prior infarct, age indeterminate L CR, old BG L lacunar infarct, L thalamic lacunar infarct.  She was not a candidate for IV thrombolytics because she was out of the window. CTA was neg for LVO.  MRI brain this morning reveals acute L infarct in the corona radiate. She was placed on DAPT. Course complicated by elevated LFTs with workup unrevealing. She was evaluated for admission to acute inpatient rehab and admitted to [facility] on 4/18/25.        Subjective:  - Patient was seen and examined in chair, NAD, participating in therapy, alert and awake  - on Seroquel per psych  - Denies any pain.   - denies dysuria, abx completed/ UTI, afebrile  - tolerating DAPT+Lovenox-no bleeding reported    ROS:  - Denies CP, palpitation, SOB, cough, fever, chills, headache, dizziness, visual changes, abdominal pain, N/V/D/C, dysuria, hematuria      MEDICATIONS  (STANDING):  aspirin  chewable 81 milliGRAM(s) Oral daily  atorvastatin 80 milliGRAM(s) Oral at bedtime  clopidogrel Tablet 75 milliGRAM(s) Oral daily  enoxaparin Injectable 40 milliGRAM(s) SubCutaneous every 24 hours  fluticasone furoate/umeclidinium/vilanterol 200-62.5-25 MICROgram(s) Inhaler 1 Puff(s) Inhalation daily  losartan 25 milliGRAM(s) Oral daily  pantoprazole    Tablet 40 milliGRAM(s) Oral before breakfast  polyethylene glycol 3350 17 Gram(s) Oral daily  potassium chloride    Tablet ER 40 milliEquivalent(s) Oral every 4 hours  QUEtiapine 12.5 milliGRAM(s) Oral <User Schedule>  senna 2 Tablet(s) Oral at bedtime    MEDICATIONS  (PRN):  acetaminophen     Tablet .. 650 milliGRAM(s) Oral every 6 hours PRN Mild Pain (1 - 3)  albuterol    90 MICROgram(s) HFA Inhaler 2 Puff(s) Inhalation every 6 hours PRN Shortness of Breath and/or Wheezing  lidocaine   4% Patch 1 Patch Transdermal daily PRN pain  QUEtiapine 12.5 milliGRAM(s) Oral daily PRN agitation                            11.5   5.75  )-----------( 282      ( 24 Apr 2025 07:05 )             34.6     04-24    137  |  105  |  17  ----------------------------<  139[H]  3.3[L]   |  23  |  0.81    Ca    8.6      24 Apr 2025 07:05    TPro  6.3  /  Alb  2.8[L]  /  TBili  0.8  /  DBili  x   /  AST  33  /  ALT  45  /  AlkPhos  104  04-24    Urinalysis Basic - ( 24 Apr 2025 07:05 )    Color: x / Appearance: x / SG: x / pH: x  Gluc: 139 mg/dL / Ketone: x  / Bili: x / Urobili: x   Blood: x / Protein: x / Nitrite: x   Leuk Esterase: x / RBC: x / WBC x   Sq Epi: x / Non Sq Epi: x / Bacteria: x      Vital Signs Last 24 Hrs  T(C): 36.8 (24 Apr 2025 08:05), Max: 37 (23 Apr 2025 20:49)  T(F): 98.2 (24 Apr 2025 08:05), Max: 98.6 (23 Apr 2025 20:49)  HR: 76 (24 Apr 2025 08:05) (73 - 76)  BP: 152/85 (24 Apr 2025 08:05) (132/73 - 152/85)  BP(mean): --  RR: 14 (24 Apr 2025 08:05) (14 - 14)  SpO2: 95% (24 Apr 2025 08:05) (95% - 95%)    Parameters below as of 24 Apr 2025 08:05  Patient On (Oxygen Delivery Method): room air        Gen - NAD in chair, not cooperative w/exam  Neuro-     Cognitive - awake, alert     Communication - Fluent     Memory: refused     Cranial Nerves -refused     Motor - Right hemiparesis noted, against gravity at least. Left appears 5/5. Observed in therapy                      Sensory - Intact  to LT     Coordination - impaired      Tone - Normal  Psychiatric - Mood irritable/anxious      Continue comprehensive acute rehab program consisting of 3hrs/day of OT/PT and SLP.

## 2025-04-24 NOTE — BH CONSULTATION LIAISON PROGRESS NOTE - NSBHFUPINTERVALHXFT_PSY_A_CORE
Patient is a 74 year old female with PMH of  COPD, pneumothorax, collapsed lung, and TIAs who presented to the ED at [facility] on 4/16 complaining of severe right arm weakness. NIHSS was 5 in ED.  CTH showed high R parietal encephalomalacia and gliosis related likely to prior infarct, age indeterminate L CR, old BG L lacunar infarct, L thalamic lacunar infarct.  MRI brain this morning reveals acute L infarct in the corona radiate. Medically optimized and transferred to [facility] for acute rehab. Psychiatry consulted for agitation.    C/L Psychiatry Note:  Chart reviewed and patient evaluated. Per discussions with nursing staff, patient was irritable and intermittently cooperative yesterday. However, patient has displayed much better mood/behaviors today. She has engaged in all rehab exercises; also apologized for her past behaviors to nursing staff. Upon evaluation, patient presents with bright affect, endorsing adjusting to her environment and treatment team. She states her mood is "good when people don't upset me," and denies any anxiety. Verbalizes she is working on getting stronger and has seen some progress with rehab. Endorses good sleep and improving appetite. Unable to elicit any symptoms of psychosis. Denies any current suicidal ideation, intent, or plan.

## 2025-04-24 NOTE — BH CONSULTATION LIAISON PROGRESS NOTE - CURRENT MEDICATION
MEDICATIONS  (STANDING):  aspirin  chewable 81 milliGRAM(s) Oral daily  atorvastatin 80 milliGRAM(s) Oral at bedtime  clopidogrel Tablet 75 milliGRAM(s) Oral daily  enoxaparin Injectable 40 milliGRAM(s) SubCutaneous every 24 hours  fluticasone furoate/umeclidinium/vilanterol 200-62.5-25 MICROgram(s) Inhaler 1 Puff(s) Inhalation daily  losartan 25 milliGRAM(s) Oral daily  pantoprazole    Tablet 40 milliGRAM(s) Oral before breakfast  polyethylene glycol 3350 17 Gram(s) Oral daily  potassium chloride    Tablet ER 40 milliEquivalent(s) Oral every 4 hours  QUEtiapine 12.5 milliGRAM(s) Oral <User Schedule>  senna 2 Tablet(s) Oral at bedtime    MEDICATIONS  (PRN):  acetaminophen     Tablet .. 650 milliGRAM(s) Oral every 6 hours PRN Mild Pain (1 - 3)  albuterol    90 MICROgram(s) HFA Inhaler 2 Puff(s) Inhalation every 6 hours PRN Shortness of Breath and/or Wheezing  lidocaine   4% Patch 1 Patch Transdermal daily PRN pain  QUEtiapine 12.5 milliGRAM(s) Oral daily PRN agitation

## 2025-04-24 NOTE — PROGRESS NOTE ADULT - SUBJECTIVE AND OBJECTIVE BOX
Patient is a 74y old  Female who presents with a chief complaint of Left thalamic ischemic stroke with right hemiparesis     Denies chest pain, SOB  Tolerating diet      Patient seen and examined at bedside.    ALLERGIES:  No Known Allergies    MEDICATIONS  (STANDING):  aspirin  chewable 81 milliGRAM(s) Oral daily  atorvastatin 80 milliGRAM(s) Oral at bedtime  cefpodoxime 200 milliGRAM(s) Oral every 12 hours  clopidogrel Tablet 75 milliGRAM(s) Oral daily  enoxaparin Injectable 40 milliGRAM(s) SubCutaneous every 24 hours  fluticasone furoate/umeclidinium/vilanterol 200-62.5-25 MICROgram(s) Inhaler 1 Puff(s) Inhalation daily  losartan 25 milliGRAM(s) Oral daily  pantoprazole    Tablet 40 milliGRAM(s) Oral before breakfast  polyethylene glycol 3350 17 Gram(s) Oral daily  QUEtiapine 12.5 milliGRAM(s) Oral <User Schedule>  senna 2 Tablet(s) Oral at bedtime    MEDICATIONS  (PRN):  acetaminophen     Tablet .. 650 milliGRAM(s) Oral every 6 hours PRN Mild Pain (1 - 3)  albuterol    90 MICROgram(s) HFA Inhaler 2 Puff(s) Inhalation every 6 hours PRN Shortness of Breath and/or Wheezing  lidocaine   4% Patch 1 Patch Transdermal daily PRN pain  QUEtiapine 12.5 milliGRAM(s) Oral daily PRN agitation    Vital Signs Last 24 Hrs  T(F): 98.2 (24 Apr 2025 08:05), Max: 98.6 (23 Apr 2025 20:49)  HR: 76 (24 Apr 2025 08:05) (73 - 76)  BP: 152/85 (24 Apr 2025 08:05) (132/73 - 152/85)  RR: 14 (24 Apr 2025 08:05) (14 - 14)  SpO2: 95% (24 Apr 2025 08:05) (95% - 95%)  I&O's Summary      PHYSICAL EXAM:  General: NAD, A/O   ENT: MMM, no scleral icterus  Neck: Supple, No JVD, no thyroidomegaly  Lungs: Clear to auscultation bilaterally, no wheezes, no rales, no rhonchi, good inspiratory effort  Cardio: RRR, S1/S2, No murmurs  Abdomen: Soft, Nontender, Nondistended; Bowel sounds present  Extremities: No calf tenderness, No pitting edema, no skin changes    LABS:                        11.5   5.75  )-----------( 282      ( 24 Apr 2025 07:05 )             34.6       04-24    137  |  105  |  17  ----------------------------<  139  3.3   |  23  |  0.81    Ca    8.6      24 Apr 2025 07:05    TPro  6.3  /  Alb  2.8  /  TBili  0.8  /  DBili  x   /  AST  33  /  ALT  45  /  AlkPhos  104  04-24     04-17 Chol 221 mg/dL LDL -- HDL 43 mg/dL Trig 118 mg/dL    Urinalysis Basic - ( 24 Apr 2025 07:05 )    Color: x / Appearance: x / SG: x / pH: x  Gluc: 139 mg/dL / Ketone: x  / Bili: x / Urobili: x   Blood: x / Protein: x / Nitrite: x   Leuk Esterase: x / RBC: x / WBC x   Sq Epi: x / Non Sq Epi: x / Bacteria: x    Culture - Urine (collected 19 Apr 2025 18:00)  Source: Clean Catch Clean Catch (Midstream)  Final Report (21 Apr 2025 21:44):    <10,000 CFU/mL Normal Urogenital Nunu    COVID-19 PCR: NotDetec (04-18-25 @ 19:10)  COVID-19 PCR: NotDetec (04-18-25 @ 19:10)

## 2025-04-24 NOTE — PROGRESS NOTE ADULT - ASSESSMENT
74 year old female with PMH of  COPD, pneumothorax, collapsed lung, and TIAs who presented to the ED at [place] on 4/16 complaining of severe right arm weakness. Per patient she woke up on  2 days prior to admit  with difficulty moving and lifting right arm. NIHSS was 5 in ED.  CTH showed high R parietal encephalomalacia and gliosis related likely to prior infarct, age indeterminate L CR, old BG L lacunar infarct, L thalamic lacunar infarct.  She was not a candidate for IV thrombolytics because she was out of the window. CTA was neg for LVO.  MRI brain this morning reveals acute L infarct in the corona radiate. She was placed on DAPT. Course complicated by elevated LFTs with workup unrevealing. She was evaluated for admission to acute inpatient rehab and admitted to [facility] on 4/18/25.     #metabolic encephalopathy, suspected due to UTI  -mild elevated leukocytosis - resolved   -Started on Vantin 200mg BID for 5 days for possible UTI > to be completed today 4/23  -UCX no growth  -Seroquel PRN agitation    #Hypokalemia  -Will replace and monitor    #Left corona radiata infarct now with dysarthria, right sided weakness  -Continue DAPT x 21 days and then aspirin 81mg daily  -Continue statin     #HTN  -Losartan 25mg daily     #COPD  -Continue albuterol PRN  -Home med: trelegy daily  -c/w Trelegy Ellipta   -resume Trelegy upon discharge    DVT prophylaxis - Lovenox       74 year old female with PMH of  COPD, pneumothorax, collapsed lung, and TIAs who presented to the ED at [place] on 4/16 complaining of severe right arm weakness. Per patient she woke up on  2 days prior to admit  with difficulty moving and lifting right arm. NIHSS was 5 in ED.  CTH showed high R parietal encephalomalacia and gliosis related likely to prior infarct, age indeterminate L CR, old BG L lacunar infarct, L thalamic lacunar infarct.  She was not a candidate for IV thrombolytics because she was out of the window. CTA was neg for LVO.  MRI brain this morning reveals acute L infarct in the corona radiate. She was placed on DAPT. Course complicated by elevated LFTs with workup unrevealing. She was evaluated for admission to acute inpatient rehab and admitted to [facility] on 4/18/25.     #metabolic encephalopathy, suspected due to UTI  -mild elevated leukocytosis - resolved   -Started on Vantin 200mg BID; will stop today  -UCX no growth  -Seroquel PRN agitation    #Hypokalemia  -Will replace and monitor    #Left corona radiata infarct now with dysarthria, right sided weakness  -Continue DAPT x 21 days and then aspirin 81mg daily  -Continue statin     #HTN  -Losartan 25mg daily     #COPD  -Continue albuterol PRN  -Home med: trelegy daily  -c/w Trelegy Ellipta   -resume Trelegy upon discharge    DVT prophylaxis - Lovenox

## 2025-04-24 NOTE — BH CONSULTATION LIAISON PROGRESS NOTE - NSBHCONSULTFOLLOWAFTERCARE_PSY_A_CORE FT
Patient disposition pending. If pending [place]EL[place], recommend f/u with consultant psychiatrist. Alternatively, can connect with psychiatric provider in network with insurance.

## 2025-04-24 NOTE — CHART NOTE - NSCHARTNOTEFT_GEN_A_CORE
Nutrition Follow Up Note  Hospital Course   (Per Electronic Medical Record)    Source:  Patient [X]  Medical Record [X]      Diet:   Regular Diet (IDDSI Level 7) w/ Thin Liquids (IDDSI Level 0)  Tolerates Diet Consistency Well  No Chewing/Swallowing Difficulties  No Recent Nausea, Vomiting, Diarrhea or Constipation (as Per Patient)  Consumes 25-75% of Meals (as Per Documentation) - States Fair Intake (Per Patient)  on Ensure Plus High Protein 8oz PO Daily (Provides 350kcal & 20grams of Protein)  Education Provided on Need for Supplementation  Obtained Food Preferences from Patient    Enteral/Parenteral Nutrition: Not Applicable    Current Weight: 117.5lb on 4/18  Obtain New Weight  Obtain Weights Weekly     Pertinent Medications: MEDICATIONS  (STANDING):  aspirin  chewable 81 milliGRAM(s) Oral daily  atorvastatin 80 milliGRAM(s) Oral at bedtime  cefpodoxime 200 milliGRAM(s) Oral every 12 hours  clopidogrel Tablet 75 milliGRAM(s) Oral daily  enoxaparin Injectable 40 milliGRAM(s) SubCutaneous every 24 hours  fluticasone furoate/umeclidinium/vilanterol 200-62.5-25 MICROgram(s) Inhaler 1 Puff(s) Inhalation daily  losartan 25 milliGRAM(s) Oral daily  pantoprazole    Tablet 40 milliGRAM(s) Oral before breakfast  polyethylene glycol 3350 17 Gram(s) Oral daily  QUEtiapine 12.5 milliGRAM(s) Oral <User Schedule>  senna 2 Tablet(s) Oral at bedtime    MEDICATIONS  (PRN):  acetaminophen     Tablet .. 650 milliGRAM(s) Oral every 6 hours PRN Mild Pain (1 - 3)  albuterol    90 MICROgram(s) HFA Inhaler 2 Puff(s) Inhalation every 6 hours PRN Shortness of Breath and/or Wheezing  lidocaine   4% Patch 1 Patch Transdermal daily PRN pain  QUEtiapine 12.5 milliGRAM(s) Oral daily PRN agitation    Pertinent Labs:   04-21 Alb 2.9 g/dL[L] 04-17 Chol 221 mg/dL[H] LDL --    HDL 43 mg/dL[L] Trig 118 mg/dL    Skin: Stage 1 Pressure Ulcer on Gluteal Cleft    Edema: None Noted (as Per Documentation)     Last Bowel Movement: on 4/20    Estimated Needs:   [X] No Change Since Previous Assessment    Previous Nutrition Diagnosis:   Severe Malnutrition    Nutrition Diagnosis is [X] Ongoing - Continues on Nutrition Supplement & Education Provided on Need for Supplementation     New Nutrition Diagnosis: [X] Not Applicable    Interventions:   1. Education Provided on Need for Supplementation   2. Recommend Continue Nutrition Plan of Care     Monitoring & Evaluation:   [X] Weights   [X] PO Intake   [X] Skin Integrity   [X] Follow Up (Per Protocol)  [X] Tolerance to Diet Prescription   [X] Other: Labs     Registered Dietitian/Nutritionist Remains Available.  Reid Connor, FERNANDEZN, CDN  Senior Dietitian    Phone# [phone]

## 2025-04-24 NOTE — BH CONSULTATION LIAISON PROGRESS NOTE - NSBHASSESSMENTFT_PSY_ALL_CORE
Patient is a 74y old  Female who presents with a chief complaint of Left thalamic ischemic stroke with right hemiparesis.     Psychiatry consulted for agitation. Per nursing, patient initially verbally abusive and uncooperative with treatment team yesterday. However, engagement and behaviors are improving.

## 2025-04-24 NOTE — BH CONSULTATION LIAISON PROGRESS NOTE - NSBHCHARTREVIEWLAB_PSY_A_CORE FT
Complete Blood Count + Automated Diff (04.24.25 @ 07:05)   Auto NRBC: 0 /100 WBCs  WBC Count: 5.75 K/uL  RBC Count: 3.83 M/uL  Hemoglobin: 11.5 g/dL  Hematocrit: 34.6 %  Mean Cell Volume: 90.3 fl  Mean Cell Hemoglobin: 30.0 pg  Mean Cell Hemoglobin Conc: 33.2 g/dL  Red Cell Distrib Width: 13.0 %  Platelet Count - Automated: 282 K/uL  Neutrophil #: 3.95 K/uL  Lymphocyte #: 0.91 K/uL  Monocyte #: 0.71 K/uL  Eosinophil #: 0.11 K/uL  Basophil #: 0.04 K/uL  Neutrophil %: 68.8: Differential percentages must be correlated with absolute numbers for   clinical significance. %  Lymphocyte %: 15.8 %  Monocyte %: 12.3 %  Eosinophil %: 1.9 %  Basophil %: 0.7 %  Auto Immature Granulocyte %: 0.5: (Includes meta, myelo and promyelocytes). Mild elevations in immature   granulocytes may be seen with many inflammatory processes and pregnancy;   clinical correlation suggested. %    Comprehensive Metabolic Panel (04.24.25 @ 07:05)   Sodium: 137 mmol/L  Potassium: 3.3 mmol/L  Chloride: 105 mmol/L  Carbon Dioxide: 23 mmol/L  Anion Gap: 9 mmol/L  Blood Urea Nitrogen: 17 mg/dL  Creatinine: 0.81 mg/dL  Glucose: 139 mg/dL  Calcium: 8.6 mg/dL  Protein Total: 6.3 g/dL  Albumin: 2.8 g/dL  Bilirubin Total: 0.8 mg/dL  Alkaline Phosphatase: 104 U/L  Aspartate Aminotransferase (AST/SGOT): 33 U/L  Alanine Aminotransferase (ALT/SGPT): 45 U/L  eGFR: 77

## 2025-04-24 NOTE — PROGRESS NOTE ADULT - ASSESSMENT
74 year old female with PMH of  COPD, pneumothorax, collapsed lung, and TIAs who presented to the ED at [place] on 4/16 complaining of severe right arm weakness. Per patient she woke up on  2 days prior to admit  with difficulty moving and lifting right arm. NIHSS was 5 in ED.  CTH showed high R parietal encephalomalacia and gliosis related likely to prior infarct, age indeterminate L CR, old BG L lacunar infarct, L thalamic lacunar infarct.  She was not a candidate for IV thrombolytics because she was out of the window. CTA was neg for LVO.  MRI brain this morning reveals acute L infarct in the corona radiate. She was placed on DAPT. Course complicated by elevated LFTs with workup unrevealing. She was evaluated for admission to acute inpatient rehab and admitted to [facility] on 4/18/25.       #Left Thalamic infarct now with dysarthria, right sided weakness, Gait Instability, ADL impairments and Functional impairments  #S/P Fall   - Comprehensive Rehab Program of PT/OT/SLP  - DAPT x 21 days and then aspirin 81mg daily from 4/16  - Atorvastatin 80 mg po daily LDL goal <70  - A1C (04/19) 5.9     #UTI  - completed abx, afebrile, denies dysuria    #Agitation   - EKG--> QTC below 500   - Seroquel 12.5 at 8 pm  - Seroquel 12.5 mg  PRN   - neg acute CT head   - Enhanced SV ordered         #HTN  -Losartan 25mg daily     #COPD  -Albuterol PRN  -Home med: trelegy daily- therapeutic interchange to Advair plus Spiriva while admitted     #Pain control  - Tylenol PRN    #GI/Bowel Mgmt   - Senna at bedtime   - Miralax daily     #DVT prophylaxis   - Lovenox    #Skin:  - on admission b/l blanchable redness heels, BUE ecchymosis, 6.5cm gluteal fissure , stage I pressure injury to gluteal cleft  - allevyn    FEN   - Diet - Regular with thins    - SLP treatment ongoing     Precautions / PROPHYLAXIS:   - Falls  - ortho: Weight bearing status: WBAT   - Lungs: Aspiration  - Pressure injury/Skin: OOB to Chair, PT/OT        Gela Carrillo  Neurology  [address], [address]  [place] [address]  Phone: [phone]  Fax: [phone]  Follow Up Time: 1 week    Mirlande Torres  [facility]  [facility] [address]  Scheduled Appointment: 06/25/2025

## 2025-04-25 PROCEDURE — 99232 SBSQ HOSP IP/OBS MODERATE 35: CPT

## 2025-04-25 PROCEDURE — 99232 SBSQ HOSP IP/OBS MODERATE 35: CPT | Mod: GC

## 2025-04-25 RX ADMIN — Medication 81 MILLIGRAM(S): at 11:57

## 2025-04-25 RX ADMIN — FLUTICASONE FUROATE, UMECLIDINIUM BROMIDE AND VILANTEROL TRIFENATATE 1 PUFF(S): 100; 62.5; 25 POWDER RESPIRATORY (INHALATION) at 09:27

## 2025-04-25 RX ADMIN — ATORVASTATIN CALCIUM 80 MILLIGRAM(S): 80 TABLET, FILM COATED ORAL at 20:35

## 2025-04-25 RX ADMIN — QUETIAPINE FUMARATE 12.5 MILLIGRAM(S): 25 TABLET ORAL at 20:35

## 2025-04-25 RX ADMIN — ENOXAPARIN SODIUM 40 MILLIGRAM(S): 100 INJECTION SUBCUTANEOUS at 08:37

## 2025-04-25 RX ADMIN — LOSARTAN POTASSIUM 25 MILLIGRAM(S): 100 TABLET, FILM COATED ORAL at 05:29

## 2025-04-25 RX ADMIN — CLOPIDOGREL BISULFATE 75 MILLIGRAM(S): 75 TABLET, FILM COATED ORAL at 11:57

## 2025-04-25 NOTE — PROGRESS NOTE ADULT - ASSESSMENT
74 year old female with PMH of  COPD, pneumothorax, collapsed lung, and TIAs who presented to the ED at [place] on 4/16 complaining of severe right arm weakness. Per patient she woke up on  2 days prior to admit  with difficulty moving and lifting right arm. NIHSS was 5 in ED.  CTH showed high R parietal encephalomalacia and gliosis related likely to prior infarct, age indeterminate L CR, old BG L lacunar infarct, L thalamic lacunar infarct.  She was not a candidate for IV thrombolytics because she was out of the window. CTA was neg for LVO.  MRI brain this morning reveals acute L infarct in the corona radiate. She was placed on DAPT. Course complicated by elevated LFTs with workup unrevealing. She was evaluated for admission to acute inpatient rehab and admitted to [facility] on 4/18/25.       #Left Thalamic infarct now with dysarthria, right sided weakness, Gait Instability, ADL impairments and Functional impairments  #S/P Fall   - Comprehensive Rehab Program of PT/OT/SLP  - DAPT x 21 days and then aspirin 81mg daily from 4/16  - Atorvastatin 80 mg po daily LDL goal <70  - A1C (04/19) 5.9     #UTI  - completed abx, afebrile, denies dysuria    #Agitation   - EKG--> QTC below 500   - Seroquel 12.5 at 8 pm  - Seroquel 12.5 mg  PRN   - neg acute CT head   - Enhanced SV ordered         #HTN  -Losartan 25mg daily     #COPD  -Albuterol PRN  -Home med: trelegy daily- therapeutic interchange to Advair plus Spiriva while admitted     #Pain control  - Tylenol PRN    #GI/Bowel Mgmt   - Senna at bedtime   - Miralax daily     #DVT prophylaxis   - Lovenox    #Skin:  - on admission b/l blanchable redness heels, BUE ecchymosis, 6.5cm gluteal fissure , stage I pressure injury to gluteal cleft  - allevyn    FEN   - Diet - Regular with thins    - SLP treatment ongoing     Precautions / PROPHYLAXIS:   - Falls  - ortho: Weight bearing status: WBAT   - Lungs: Aspiration  - Pressure injury/Skin: OOB to Chair, PT/OT        Gela Carrillo  Neurology  [address], [address]  [place] [address]  Phone: [phone]  Fax: [phone]  Follow Up Time: 1 week    Mirlande Torres  [facility]  [facility] [address]  Scheduled Appointment: 06/25/2025

## 2025-04-25 NOTE — PROGRESS NOTE ADULT - SUBJECTIVE AND OBJECTIVE BOX
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      Interval History:  Patient seen and examined at bedside.  No overnight events  Vitally appropriate    ALLERGIES:  No Known Allergies    MEDICATIONS  (STANDING):  aspirin  chewable 81 milliGRAM(s) Oral daily  atorvastatin 80 milliGRAM(s) Oral at bedtime  clopidogrel Tablet 75 milliGRAM(s) Oral daily  enoxaparin Injectable 40 milliGRAM(s) SubCutaneous every 24 hours  fluticasone furoate/umeclidinium/vilanterol 200-62.5-25 MICROgram(s) Inhaler 1 Puff(s) Inhalation daily  losartan 25 milliGRAM(s) Oral daily  pantoprazole    Tablet 40 milliGRAM(s) Oral before breakfast  polyethylene glycol 3350 17 Gram(s) Oral daily  QUEtiapine 12.5 milliGRAM(s) Oral <User Schedule>  senna 2 Tablet(s) Oral at bedtime    MEDICATIONS  (PRN):  acetaminophen     Tablet .. 650 milliGRAM(s) Oral every 6 hours PRN Mild Pain (1 - 3)  albuterol    90 MICROgram(s) HFA Inhaler 2 Puff(s) Inhalation every 6 hours PRN Shortness of Breath and/or Wheezing  lidocaine   4% Patch 1 Patch Transdermal daily PRN pain  QUEtiapine 12.5 milliGRAM(s) Oral daily PRN agitation    Vital Signs Last 24 Hrs  T(F): 98.9 (25 Apr 2025 07:51), Max: 98.9 (25 Apr 2025 07:51)  HR: 69 (25 Apr 2025 09:14) (69 - 78)  BP: 158/83 (25 Apr 2025 07:51) (132/83 - 158/83)  RR: 16 (25 Apr 2025 07:51) (16 - 17)  SpO2: 95% (25 Apr 2025 09:14) (95% - 96%)  I&O's Summary        PHYSICAL EXAM:  GENERAL: NAD  NERVOUS SYSTEM:  RUE weakness   CHEST/LUNG: Clear to percussion bilaterally; No rales, rhonchi, wheezing, or rubs; normal respiratory effort, no intercostal retractions  HEART: Regular rate and rhythm; No murmurs, rubs, or gallops; No pitting edema  ABDOMEN: Soft, Nontender, Nondistended; Bowel sounds present; No HSM or masses  MUSCULOSKELETAL/EXTREMITIES:  2+ Peripheral Pulses, No clubbing or digital cyanosis; FROM of extremeties (pain, crepitation or contracture)  PSYCH: Appropriate affect, Alert & Oriented x 3, Good Memory; Good insight    LABS:                        11.5   5.75  )-----------( 282      ( 24 Apr 2025 07:05 )             34.6       04-24    137  |  105  |  17  ----------------------------<  139  3.3   |  23  |  0.81    Ca    8.6      24 Apr 2025 07:05    TPro  6.3  /  Alb  2.8  /  TBili  0.8  /  DBili  x   /  AST  33  /  ALT  45  /  AlkPhos  104  04-24 04-17 Chol 221 mg/dL LDL -- HDL 43 mg/dL Trig 118 mg/dL                  Urinalysis Basic - ( 24 Apr 2025 07:05 )    Color: x / Appearance: x / SG: x / pH: x  Gluc: 139 mg/dL / Ketone: x  / Bili: x / Urobili: x   Blood: x / Protein: x / Nitrite: x   Leuk Esterase: x / RBC: x / WBC x   Sq Epi: x / Non Sq Epi: x / Bacteria: x        Culture - Urine (collected 19 Apr 2025 18:00)  Source: Clean Catch Clean Catch (Midstream)  Final Report (21 Apr 2025 21:44):    <10,000 CFU/mL Normal Urogenital Nunu      COVID-19 PCR: NotDetec (04-18-25 @ 19:10)      Care Discussed with Consultants/Other Providers: Yes

## 2025-04-25 NOTE — PROGRESS NOTE ADULT - ASSESSMENT
74 year old female with PMH of  COPD, pneumothorax, collapsed lung, and TIAs who presented to the ED at [place] on 4/16 complaining of severe right arm weakness. Per patient she woke up on  2 days prior to admit  with difficulty moving and lifting right arm. NIHSS was 5 in ED.  CTH showed high R parietal encephalomalacia and gliosis related likely to prior infarct, age indeterminate L CR, old BG L lacunar infarct, L thalamic lacunar infarct.  She was not a candidate for IV thrombolytics because she was out of the window. CTA was neg for LVO.  MRI brain this morning reveals acute L infarct in the corona radiate. She was placed on DAPT. Course complicated by elevated LFTs with workup unrevealing. She was evaluated for admission to acute inpatient rehab and admitted to [facility] on 4/18/25.     #metabolic encephalopathy, suspected due to UTI  -mild elevated leukocytosis - resolved   -Started on Vantin 200mg BID; finished   -UCX no growth  -Seroquel PRN agitation    #Hypokalemia  - Potassium supplemented today  - Monitor with next labs    #Left corona radiata infarct now with dysarthria, right sided weakness  -Continue DAPT x 21 days and then aspirin 81mg daily  -Continue statin     #HTN  -Losartan 25mg daily   -Monitor BP, would likely need to increase the Losartan dose to 50 mg daily     #COPD  -Continue albuterol PRN  -Home med: trelegy daily  -c/w Trelegy Ellipta   -resume Trelegy upon discharge    DVT prophylaxis - Lovenox

## 2025-04-25 NOTE — PROGRESS NOTE ADULT - SUBJECTIVE AND OBJECTIVE BOX
HPI:  74 year old female with PMH of  COPD, pneumothorax, collapsed lung, and TIAs who presented to the ED at [place] on 4/16 complaining of severe right arm weakness. Per patient she woke up on  2 days prior to admit  with difficulty moving and lifting right arm. NIHSS was 5 in ED.  CTH showed high R parietal encephalomalacia and gliosis related likely to prior infarct, age indeterminate L CR, old BG L lacunar infarct, L thalamic lacunar infarct.  She was not a candidate for IV thrombolytics because she was out of the window. CTA was neg for LVO.  MRI brain this morning reveals acute L infarct in the corona radiate. She was placed on DAPT. Course complicated by elevated LFTs with workup unrevealing. She was evaluated for admission to acute inpatient rehab and admitted to [facility] on 4/18/25.  (18 Apr 2025 14:01)      Subjective:  - Patient was seen and examined in therapy, NAD, participating in therapy, alert and awake  - on Seroquel per psych  - Denies any pain.   - denies dysuria, abx completed/ UTI, afebrile  - tolerating DAPT+Lovenox-no bleeding reported    ROS:  - Denies CP, palpitation, SOB, cough, fever, chills, headache, dizziness, visual changes, abdominal pain, N/V/D/C, dysuria, hematuria      MEDICATIONS  (STANDING):  aspirin  chewable 81 milliGRAM(s) Oral daily  atorvastatin 80 milliGRAM(s) Oral at bedtime  clopidogrel Tablet 75 milliGRAM(s) Oral daily  enoxaparin Injectable 40 milliGRAM(s) SubCutaneous every 24 hours  fluticasone furoate/umeclidinium/vilanterol 200-62.5-25 MICROgram(s) Inhaler 1 Puff(s) Inhalation daily  losartan 25 milliGRAM(s) Oral daily  pantoprazole    Tablet 40 milliGRAM(s) Oral before breakfast  polyethylene glycol 3350 17 Gram(s) Oral daily  QUEtiapine 12.5 milliGRAM(s) Oral <User Schedule>  senna 2 Tablet(s) Oral at bedtime    MEDICATIONS  (PRN):  acetaminophen     Tablet .. 650 milliGRAM(s) Oral every 6 hours PRN Mild Pain (1 - 3)  albuterol    90 MICROgram(s) HFA Inhaler 2 Puff(s) Inhalation every 6 hours PRN Shortness of Breath and/or Wheezing  lidocaine   4% Patch 1 Patch Transdermal daily PRN pain  QUEtiapine 12.5 milliGRAM(s) Oral daily PRN agitation                            11.5   5.75  )-----------( 282      ( 24 Apr 2025 07:05 )             34.6     04-24    137  |  105  |  17  ----------------------------<  139[H]  3.3[L]   |  23  |  0.81    Ca    8.6      24 Apr 2025 07:05    TPro  6.3  /  Alb  2.8[L]  /  TBili  0.8  /  DBili  x   /  AST  33  /  ALT  45  /  AlkPhos  104  04-24    Urinalysis Basic - ( 24 Apr 2025 07:05 )    Color: x / Appearance: x / SG: x / pH: x  Gluc: 139 mg/dL / Ketone: x  / Bili: x / Urobili: x   Blood: x / Protein: x / Nitrite: x   Leuk Esterase: x / RBC: x / WBC x   Sq Epi: x / Non Sq Epi: x / Bacteria: x        Vital Signs Last 24 Hrs  T(C): 37.2 (25 Apr 2025 07:51), Max: 37.2 (25 Apr 2025 07:51)  T(F): 98.9 (25 Apr 2025 07:51), Max: 98.9 (25 Apr 2025 07:51)  HR: 69 (25 Apr 2025 09:14) (69 - 78)  BP: 158/83 (25 Apr 2025 07:51) (132/83 - 158/83)  BP(mean): --  RR: 16 (25 Apr 2025 07:51) (16 - 17)  SpO2: 95% (25 Apr 2025 09:14) (95% - 96%)    Parameters below as of 25 Apr 2025 09:14  Patient On (Oxygen Delivery Method): room air      Gen - NAD in chair, not cooperative w/exam  Neuro-     Cognitive - awake, alert     Communication - Fluent     Memory: refused     Cranial Nerves -refused     Motor - Right hemiparesis noted, against gravity at least. Left appears 5/5. Observed in therapy                      Sensory - Intact  to LT     Coordination - impaired      Tone - Normal  Psychiatric - Mood irritable/anxious        Continue comprehensive acute rehab program consisting of 3hrs/day of OT/PT and SLP.

## 2025-04-26 PROCEDURE — 99232 SBSQ HOSP IP/OBS MODERATE 35: CPT

## 2025-04-26 PROCEDURE — 99222 1ST HOSP IP/OBS MODERATE 55: CPT

## 2025-04-26 RX ADMIN — LOSARTAN POTASSIUM 25 MILLIGRAM(S): 100 TABLET, FILM COATED ORAL at 05:58

## 2025-04-26 RX ADMIN — FLUTICASONE FUROATE, UMECLIDINIUM BROMIDE AND VILANTEROL TRIFENATATE 1 PUFF(S): 100; 62.5; 25 POWDER RESPIRATORY (INHALATION) at 08:55

## 2025-04-26 RX ADMIN — Medication 40 MILLIGRAM(S): at 05:58

## 2025-04-26 RX ADMIN — ENOXAPARIN SODIUM 40 MILLIGRAM(S): 100 INJECTION SUBCUTANEOUS at 08:57

## 2025-04-26 RX ADMIN — ATORVASTATIN CALCIUM 80 MILLIGRAM(S): 80 TABLET, FILM COATED ORAL at 22:24

## 2025-04-26 RX ADMIN — CLOPIDOGREL BISULFATE 75 MILLIGRAM(S): 75 TABLET, FILM COATED ORAL at 11:15

## 2025-04-26 RX ADMIN — QUETIAPINE FUMARATE 12.5 MILLIGRAM(S): 25 TABLET ORAL at 22:24

## 2025-04-26 RX ADMIN — Medication 81 MILLIGRAM(S): at 11:15

## 2025-04-26 NOTE — PROGRESS NOTE ADULT - SUBJECTIVE AND OBJECTIVE BOX
Patient is a 74y old  Female who presents with a chief complaint of Left thalamic ischemic stroke with right hemiparesis     Patient seen and examined at bedside.    ALLERGIES:  No Known Allergies    MEDICATIONS  (STANDING):  aspirin  chewable 81 milliGRAM(s) Oral daily  atorvastatin 80 milliGRAM(s) Oral at bedtime  clopidogrel Tablet 75 milliGRAM(s) Oral daily  enoxaparin Injectable 40 milliGRAM(s) SubCutaneous every 24 hours  fluticasone furoate/umeclidinium/vilanterol 200-62.5-25 MICROgram(s) Inhaler 1 Puff(s) Inhalation daily  losartan 25 milliGRAM(s) Oral daily  pantoprazole    Tablet 40 milliGRAM(s) Oral before breakfast  polyethylene glycol 3350 17 Gram(s) Oral daily  QUEtiapine 12.5 milliGRAM(s) Oral <User Schedule>  senna 2 Tablet(s) Oral at bedtime    MEDICATIONS  (PRN):  acetaminophen     Tablet .. 650 milliGRAM(s) Oral every 6 hours PRN Mild Pain (1 - 3)  albuterol    90 MICROgram(s) HFA Inhaler 2 Puff(s) Inhalation every 6 hours PRN Shortness of Breath and/or Wheezing  lidocaine   4% Patch 1 Patch Transdermal daily PRN pain  QUEtiapine 12.5 milliGRAM(s) Oral daily PRN agitation    Vital Signs Last 24 Hrs  T(F): 98.4 (26 Apr 2025 05:58), Max: 98.9 (25 Apr 2025 07:51)  HR: 76 (26 Apr 2025 05:58) (69 - 77)  BP: 146/79 (26 Apr 2025 05:58) (146/79 - 158/83)  RR: 15 (26 Apr 2025 05:58) (15 - 16)  SpO2: 97% (26 Apr 2025 05:58) (95% - 97%)  I&O's Summary      PHYSICAL EXAM:  General: NAD, A/O   ENT: MMM, no scleral icterus  Neck: Supple, No JVD, no thyroidomegaly  Lungs: Clear to auscultation bilaterally, no wheezes, no rales, no rhonchi, good inspiratory effort  Cardio: RRR, S1/S2, No murmurs  Abdomen: Soft, Nontender, Nondistended; Bowel sounds present  Extremities: No calf tenderness, No pitting edema, no skin changes    LABS:                        11.5   5.75  )-----------( 282      ( 24 Apr 2025 07:05 )             34.6       04-24    137  |  105  |  17  ----------------------------<  139  3.3   |  23  |  0.81    Ca    8.6      24 Apr 2025 07:05    TPro  6.3  /  Alb  2.8  /  TBili  0.8  /  DBili  x   /  AST  33  /  ALT  45  /  AlkPhos  104  04-24     04-17 Chol 221 mg/dL LDL -- HDL 43 mg/dL Trig 118 mg/dL    Urinalysis Basic - ( 24 Apr 2025 07:05 )    Color: x / Appearance: x / SG: x / pH: x  Gluc: 139 mg/dL / Ketone: x  / Bili: x / Urobili: x   Blood: x / Protein: x / Nitrite: x   Leuk Esterase: x / RBC: x / WBC x   Sq Epi: x / Non Sq Epi: x / Bacteria: x    Culture - Urine (collected 19 Apr 2025 18:00)  Source: Clean Catch Clean Catch (Midstream)  Final Report (21 Apr 2025 21:44):    <10,000 CFU/mL Normal Urogenital Nunu      COVID-19 PCR: NotDetec (04-18-25 @ 19:10)  COVID-19 PCR: NotDetec (04-18-25 @ 19:10)

## 2025-04-26 NOTE — PROGRESS NOTE ADULT - ASSESSMENT
74 year old female with PMH of  COPD, pneumothorax, collapsed lung, and TIAs who presented to the ED at [place] on 4/16 complaining of severe right arm weakness. Per patient she woke up on  2 days prior to admit  with difficulty moving and lifting right arm. NIHSS was 5 in ED.  CTH showed high R parietal encephalomalacia and gliosis related likely to prior infarct, age indeterminate L CR, old BG L lacunar infarct, L thalamic lacunar infarct.  She was not a candidate for IV thrombolytics because she was out of the window. CTA was neg for LVO.  MRI brain this morning reveals acute L infarct in the corona radiate. She was placed on DAPT. Course complicated by elevated LFTs with workup unrevealing. She was evaluated for admission to acute inpatient rehab and admitted to [facility] on 4/18/25.     #metabolic encephalopathy, suspected due to UTI  -mild elevated leukocytosis - resolved   -s/p Vantin 200mg BID  -UCX no growth  -Seroquel PRN agitation    #Hypokalemia  - Potassium supplemented   - Monitor with next labs    #Left corona radiata infarct now with dysarthria, right sided weakness  -Continue DAPT x 21 days and then aspirin 81mg daily  -Continue statin     #HTN  -Losartan 25mg daily   -Monitor BP, would likely need to increase the Losartan dose to 50 mg daily     #COPD  -Continue albuterol PRN  -Home med: trelegy daily  -c/w Trelegy Ellipta   -resume Trelegy upon discharge    DVT prophylaxis - Lovenox

## 2025-04-26 NOTE — PROGRESS NOTE ADULT - SUBJECTIVE AND OBJECTIVE BOX
Cc: Gait dysfunction 2/2 CVA    HPI: Patient with no new medical issues today.  Pain controlled, no chest pain, no N/V, no Fevers/Chills. No other new ROS  Has been tolerating rehabilitation program.    acetaminophen     Tablet .. 650 milliGRAM(s) Oral every 6 hours PRN  albuterol    90 MICROgram(s) HFA Inhaler 2 Puff(s) Inhalation every 6 hours PRN  aspirin  chewable 81 milliGRAM(s) Oral daily  atorvastatin 80 milliGRAM(s) Oral at bedtime  clopidogrel Tablet 75 milliGRAM(s) Oral daily  enoxaparin Injectable 40 milliGRAM(s) SubCutaneous every 24 hours  fluticasone furoate/umeclidinium/vilanterol 200-62.5-25 MICROgram(s) Inhaler 1 Puff(s) Inhalation daily  lidocaine   4% Patch 1 Patch Transdermal daily PRN  losartan 25 milliGRAM(s) Oral daily  pantoprazole    Tablet 40 milliGRAM(s) Oral before breakfast  polyethylene glycol 3350 17 Gram(s) Oral daily  QUEtiapine 12.5 milliGRAM(s) Oral daily PRN  QUEtiapine 12.5 milliGRAM(s) Oral <User Schedule>  senna 2 Tablet(s) Oral at bedtime      T(C): 36.9 (04-26-25 @ 05:58), Max: 36.9 (04-26-25 @ 05:58)  HR: 76 (04-26-25 @ 05:58) (69 - 77)  BP: 146/79 (04-26-25 @ 05:58) (146/79 - 154/82)  RR: 15 (04-26-25 @ 05:58) (15 - 15)  SpO2: 97% (04-26-25 @ 05:58) (95% - 97%)    In NAD  HEENT- EOMI  Heart- RRR, S1S2  Lungs- no respiratory distress  Abd-  NT  Ext- No calf pain  Neuro- weakness RUE and RLE  R - trace finger movements, wrist 2/5  RLE 3/5 hip flexion         Imp: Patient with diagnosis of L corona radiata cva admitted for comprehensive acute rehabilitation.    Plan:  - Continue therapies  - DVT prophylaxis - lovenox  - Skin- Turn q2h, check skin daily  - Continue current medications; patient medically stable.   - Patient is stable to continue current rehabilitation program.       35 minutes spent on total encounter including chart review, documentation, counseling and education, exam, care coordination

## 2025-04-27 PROCEDURE — 99232 SBSQ HOSP IP/OBS MODERATE 35: CPT

## 2025-04-27 RX ADMIN — ATORVASTATIN CALCIUM 80 MILLIGRAM(S): 80 TABLET, FILM COATED ORAL at 22:04

## 2025-04-27 RX ADMIN — Medication 81 MILLIGRAM(S): at 12:55

## 2025-04-27 RX ADMIN — LOSARTAN POTASSIUM 25 MILLIGRAM(S): 100 TABLET, FILM COATED ORAL at 05:02

## 2025-04-27 RX ADMIN — QUETIAPINE FUMARATE 12.5 MILLIGRAM(S): 25 TABLET ORAL at 22:04

## 2025-04-27 RX ADMIN — Medication 40 MILLIGRAM(S): at 05:02

## 2025-04-27 RX ADMIN — CLOPIDOGREL BISULFATE 75 MILLIGRAM(S): 75 TABLET, FILM COATED ORAL at 12:35

## 2025-04-27 RX ADMIN — FLUTICASONE FUROATE, UMECLIDINIUM BROMIDE AND VILANTEROL TRIFENATATE 1 PUFF(S): 100; 62.5; 25 POWDER RESPIRATORY (INHALATION) at 09:34

## 2025-04-27 RX ADMIN — Medication 2 TABLET(S): at 22:05

## 2025-04-27 RX ADMIN — ENOXAPARIN SODIUM 40 MILLIGRAM(S): 100 INJECTION SUBCUTANEOUS at 07:42

## 2025-04-27 NOTE — PROGRESS NOTE ADULT - SUBJECTIVE AND OBJECTIVE BOX
CC: Patient is a 74y old  Female who presents with a chief complaint of Left thalamic ischemic stroke with right hemiparesis (27 Apr 2025 08:14)      Interval History: Patient seen and examined at bedside. No acute overnight events. No complaints this morning.    ALLERGIES:  No Known Allergies    MEDICATIONS  (STANDING):  aspirin  chewable 81 milliGRAM(s) Oral daily  atorvastatin 80 milliGRAM(s) Oral at bedtime  clopidogrel Tablet 75 milliGRAM(s) Oral daily  enoxaparin Injectable 40 milliGRAM(s) SubCutaneous every 24 hours  fluticasone furoate/umeclidinium/vilanterol 200-62.5-25 MICROgram(s) Inhaler 1 Puff(s) Inhalation daily  losartan 25 milliGRAM(s) Oral daily  pantoprazole    Tablet 40 milliGRAM(s) Oral before breakfast  polyethylene glycol 3350 17 Gram(s) Oral daily  QUEtiapine 12.5 milliGRAM(s) Oral <User Schedule>  senna 2 Tablet(s) Oral at bedtime    MEDICATIONS  (PRN):  acetaminophen     Tablet .. 650 milliGRAM(s) Oral every 6 hours PRN Mild Pain (1 - 3)  albuterol    90 MICROgram(s) HFA Inhaler 2 Puff(s) Inhalation every 6 hours PRN Shortness of Breath and/or Wheezing  lidocaine   4% Patch 1 Patch Transdermal daily PRN pain  QUEtiapine 12.5 milliGRAM(s) Oral daily PRN agitation    Vital Signs Last 24 Hrs  T(F): 98.2 (27 Apr 2025 05:07), Max: 98.4 (26 Apr 2025 22:25)  HR: 75 (27 Apr 2025 07:45) (75 - 80)  BP: 149/82 (27 Apr 2025 07:45) (125/78 - 149/82)  RR: 15 (27 Apr 2025 07:45) (15 - 15)  SpO2: 93% (27 Apr 2025 07:45) (93% - 97%)  I&O's Summary        PHYSICAL EXAM:  GENERAL: pt sitting up eating in NAD  NECK: supple, symmetric  CHEST/LUNG: nonlabored breathing, normal respiratory effort   ABDOMEN: Soft, Nontender   MUSCULOSKELETAL/EXTREMITIES: no cyanosis, no edema noted to BLE  NERVOUS SYSTEM: Sensation intact; follows commands  PSYCH: Appropriate affect, Alert & Oriented     LABS:                        04-17 Chol 221 mg/dL LDL -- HDL 43 mg/dL Trig 118 mg/dL                      COVID-19 PCR: NotDetec (04-18-25 @ 19:10)      Care Discussed with Consultants/Other Providers: Yes

## 2025-04-27 NOTE — PROGRESS NOTE ADULT - SUBJECTIVE AND OBJECTIVE BOX
Cc: Gait dysfunction 2/2 thalamic infarct    HPI: Patient with no new medical issues today.  Pain controlled, no chest pain, no N/V, no Fevers/Chills. No other new ROS  Has been tolerating rehabilitation program.    acetaminophen     Tablet .. 650 milliGRAM(s) Oral every 6 hours PRN  albuterol    90 MICROgram(s) HFA Inhaler 2 Puff(s) Inhalation every 6 hours PRN  aspirin  chewable 81 milliGRAM(s) Oral daily  atorvastatin 80 milliGRAM(s) Oral at bedtime  clopidogrel Tablet 75 milliGRAM(s) Oral daily  enoxaparin Injectable 40 milliGRAM(s) SubCutaneous every 24 hours  fluticasone furoate/umeclidinium/vilanterol 200-62.5-25 MICROgram(s) Inhaler 1 Puff(s) Inhalation daily  lidocaine   4% Patch 1 Patch Transdermal daily PRN  losartan 25 milliGRAM(s) Oral daily  pantoprazole    Tablet 40 milliGRAM(s) Oral before breakfast  polyethylene glycol 3350 17 Gram(s) Oral daily  QUEtiapine 12.5 milliGRAM(s) Oral daily PRN  QUEtiapine 12.5 milliGRAM(s) Oral <User Schedule>  senna 2 Tablet(s) Oral at bedtime      T(C): 36.8 (04-27-25 @ 05:07), Max: 37.1 (04-26-25 @ 08:42)  HR: 78 (04-27-25 @ 05:07) (72 - 86)  BP: 132/76 (04-27-25 @ 05:07) (125/78 - 132/76)  RR: 15 (04-27-25 @ 05:07) (15 - 16)  SpO2: 97% (04-27-25 @ 05:07) (96% - 97%)    In NAD  HEENT- EOMI  Heart- RRR, S1S2  Lungs- no respiratory distress  Abd-  NT  Ext- No calf pain  Neuro- Exam unchanged  oriented x 3        Imp: Patient with diagnosis of cva admitted for comprehensive acute rehabilitation.    Plan:  - Continue therapies  - DVT prophylaxis- lovenox  - Skin- Turn q2h, check skin daily  - Continue current medications; patient medically stable.   - Patient is stable to continue current rehabilitation program.       35 minutes spent on total encounter including chart review, documentation, counseling and education, exam, care coordination

## 2025-04-28 LAB
ALBUMIN SERPL ELPH-MCNC: 2.6 G/DL — LOW (ref 3.3–5)
ALP SERPL-CCNC: 110 U/L — SIGNIFICANT CHANGE UP (ref 40–120)
ALT FLD-CCNC: 30 U/L — SIGNIFICANT CHANGE UP (ref 10–45)
ANION GAP SERPL CALC-SCNC: 10 MMOL/L — SIGNIFICANT CHANGE UP (ref 5–17)
AST SERPL-CCNC: 21 U/L — SIGNIFICANT CHANGE UP (ref 10–40)
BASOPHILS # BLD AUTO: 0.06 K/UL — SIGNIFICANT CHANGE UP (ref 0–0.2)
BASOPHILS NFR BLD AUTO: 1 % — SIGNIFICANT CHANGE UP (ref 0–2)
BILIRUB SERPL-MCNC: 0.9 MG/DL — SIGNIFICANT CHANGE UP (ref 0.2–1.2)
BUN SERPL-MCNC: 10 MG/DL — SIGNIFICANT CHANGE UP (ref 7–23)
CALCIUM SERPL-MCNC: 8.5 MG/DL — SIGNIFICANT CHANGE UP (ref 8.4–10.5)
CHLORIDE SERPL-SCNC: 103 MMOL/L — SIGNIFICANT CHANGE UP (ref 96–108)
CO2 SERPL-SCNC: 21 MMOL/L — LOW (ref 22–31)
CREAT SERPL-MCNC: 0.61 MG/DL — SIGNIFICANT CHANGE UP (ref 0.5–1.3)
EGFR: 94 ML/MIN/1.73M2 — SIGNIFICANT CHANGE UP
EGFR: 94 ML/MIN/1.73M2 — SIGNIFICANT CHANGE UP
EOSINOPHIL # BLD AUTO: 0.12 K/UL — SIGNIFICANT CHANGE UP (ref 0–0.5)
EOSINOPHIL NFR BLD AUTO: 2 % — SIGNIFICANT CHANGE UP (ref 0–6)
GLUCOSE SERPL-MCNC: 107 MG/DL — HIGH (ref 70–99)
HCT VFR BLD CALC: 35.4 % — SIGNIFICANT CHANGE UP (ref 34.5–45)
HGB BLD-MCNC: 11.9 G/DL — SIGNIFICANT CHANGE UP (ref 11.5–15.5)
IMM GRANULOCYTES NFR BLD AUTO: 0.5 % — SIGNIFICANT CHANGE UP (ref 0–0.9)
LYMPHOCYTES # BLD AUTO: 0.96 K/UL — LOW (ref 1–3.3)
LYMPHOCYTES # BLD AUTO: 15.8 % — SIGNIFICANT CHANGE UP (ref 13–44)
MCHC RBC-ENTMCNC: 30.1 PG — SIGNIFICANT CHANGE UP (ref 27–34)
MCHC RBC-ENTMCNC: 33.6 G/DL — SIGNIFICANT CHANGE UP (ref 32–36)
MCV RBC AUTO: 89.4 FL — SIGNIFICANT CHANGE UP (ref 80–100)
MONOCYTES # BLD AUTO: 0.72 K/UL — SIGNIFICANT CHANGE UP (ref 0–0.9)
MONOCYTES NFR BLD AUTO: 11.8 % — SIGNIFICANT CHANGE UP (ref 2–14)
NEUTROPHILS # BLD AUTO: 4.19 K/UL — SIGNIFICANT CHANGE UP (ref 1.8–7.4)
NEUTROPHILS NFR BLD AUTO: 68.9 % — SIGNIFICANT CHANGE UP (ref 43–77)
NRBC BLD AUTO-RTO: 0 /100 WBCS — SIGNIFICANT CHANGE UP (ref 0–0)
PLATELET # BLD AUTO: 290 K/UL — SIGNIFICANT CHANGE UP (ref 150–400)
POTASSIUM SERPL-MCNC: 3.5 MMOL/L — SIGNIFICANT CHANGE UP (ref 3.5–5.3)
POTASSIUM SERPL-SCNC: 3.5 MMOL/L — SIGNIFICANT CHANGE UP (ref 3.5–5.3)
PROT SERPL-MCNC: 6.3 G/DL — SIGNIFICANT CHANGE UP (ref 6–8.3)
RBC # BLD: 3.96 M/UL — SIGNIFICANT CHANGE UP (ref 3.8–5.2)
RBC # FLD: 13.1 % — SIGNIFICANT CHANGE UP (ref 10.3–14.5)
SODIUM SERPL-SCNC: 134 MMOL/L — LOW (ref 135–145)
WBC # BLD: 6.08 K/UL — SIGNIFICANT CHANGE UP (ref 3.8–10.5)
WBC # FLD AUTO: 6.08 K/UL — SIGNIFICANT CHANGE UP (ref 3.8–10.5)

## 2025-04-28 PROCEDURE — 99232 SBSQ HOSP IP/OBS MODERATE 35: CPT

## 2025-04-28 PROCEDURE — 99232 SBSQ HOSP IP/OBS MODERATE 35: CPT | Mod: GC

## 2025-04-28 RX ADMIN — Medication 2 TABLET(S): at 22:36

## 2025-04-28 RX ADMIN — Medication 40 MILLIGRAM(S): at 05:32

## 2025-04-28 RX ADMIN — QUETIAPINE FUMARATE 12.5 MILLIGRAM(S): 25 TABLET ORAL at 22:37

## 2025-04-28 RX ADMIN — ENOXAPARIN SODIUM 40 MILLIGRAM(S): 100 INJECTION SUBCUTANEOUS at 08:00

## 2025-04-28 RX ADMIN — FLUTICASONE FUROATE, UMECLIDINIUM BROMIDE AND VILANTEROL TRIFENATATE 1 PUFF(S): 100; 62.5; 25 POWDER RESPIRATORY (INHALATION) at 08:49

## 2025-04-28 RX ADMIN — LOSARTAN POTASSIUM 25 MILLIGRAM(S): 100 TABLET, FILM COATED ORAL at 05:32

## 2025-04-28 RX ADMIN — Medication 81 MILLIGRAM(S): at 11:18

## 2025-04-28 RX ADMIN — ATORVASTATIN CALCIUM 80 MILLIGRAM(S): 80 TABLET, FILM COATED ORAL at 22:36

## 2025-04-28 RX ADMIN — Medication 20 MILLIEQUIVALENT(S): at 12:15

## 2025-04-28 RX ADMIN — CLOPIDOGREL BISULFATE 75 MILLIGRAM(S): 75 TABLET, FILM COATED ORAL at 11:18

## 2025-04-28 NOTE — PROGRESS NOTE ADULT - ASSESSMENT
74 year old female with PMH of  COPD, pneumothorax, collapsed lung, and TIAs who presented to the ED at [place] on 4/16 complaining of severe right arm weakness. Per patient she woke up on  2 days prior to admit  with difficulty moving and lifting right arm. NIHSS was 5 in ED.  CTH showed high R parietal encephalomalacia and gliosis related likely to prior infarct, age indeterminate L CR, old BG L lacunar infarct, L thalamic lacunar infarct.  She was not a candidate for IV thrombolytics because she was out of the window. CTA was neg for LVO.  MRI brain this morning reveals acute L infarct in the corona radiate. She was placed on DAPT. Course complicated by elevated LFTs with workup unrevealing. She was evaluated for admission to acute inpatient rehab and admitted to [facility] on 4/18/25.     #metabolic encephalopathy, suspected due to UTI - improving  -mild elevated leukocytosis - resolved   -s/p Vantin 200mg BID  -UCX no growth  -Seroquel PRN agitation  -  rec noted    #Hypokalemia  - K 3.5 today  - one dose of KCL 20 meq ordered. 4/28  - monitor K and Mg    #Left corona radiata infarct now with dysarthria, right sided weakness  -Continue DAPT x 21 days and then aspirin 81mg daily  -Continue statin   - comprehensive rehab  - fall, aspiration precautions    #HTN  - Target  -160  - Losartan 25mg daily   -Monitor BP, including OH.   - avoid hypotension and BP fluctuation  - DASH    #Hx COPD, not in exacerbation   -Continue albuterol PRN  -Home med: trelegy daily  -c/w Trelegy Ellipta   -resume Trelegy upon discharge    #Severe protein-calorie malnutrition  - nutrition recs as below    DVT prophylaxis - Lovenox      d/w rehab team at [facility]

## 2025-04-28 NOTE — PROGRESS NOTE ADULT - ASSESSMENT
74 year old female with PMH of  COPD, pneumothorax, collapsed lung, and TIAs who presented to the ED at [place] on 4/16 complaining of severe right arm weakness. Per patient she woke up on  2 days prior to admit  with difficulty moving and lifting right arm. NIHSS was 5 in ED.  CTH showed high R parietal encephalomalacia and gliosis related likely to prior infarct, age indeterminate L CR, old BG L lacunar infarct, L thalamic lacunar infarct.  She was not a candidate for IV thrombolytics because she was out of the window. CTA was neg for LVO.  MRI brain this morning reveals acute L infarct in the corona radiate. She was placed on DAPT. Course complicated by elevated LFTs with workup unrevealing. She was evaluated for admission to acute inpatient rehab and admitted to [facility] on 4/18/25.       #Left Thalamic infarct now with dysarthria, right sided weakness, Gait Instability, ADL impairments and Functional impairments  #S/P Fall   - Comprehensive Rehab Program of PT/OT/SLP  - DAPT x 21 days and then aspirin 81mg daily from 4/16  - Atorvastatin 80 mg po daily LDL goal <70  - A1C (04/19) 5.9     #UTI  - completed abx, afebrile, denies dysuria    #Agitation   - EKG--> QTC below 500   - Seroquel 12.5 at 8 pm  - Seroquel 12.5 mg  PRN   - neg acute CT head   - forgetful, frequent rounding        #HTN  -Losartan 25mg daily     #COPD  -Albuterol PRN  -Home med: trelegy daily- therapeutic interchange to Advair plus Spiriva while admitted     #Pain control  - Tylenol PRN    #GI/Bowel Mgmt   - Senna at bedtime   - Miralax daily     #DVT prophylaxis   - Lovenox    #Skin:  - on admission b/l blanchable redness heels, BUE ecchymosis, 6.5cm gluteal fissure , stage I pressure injury to gluteal cleft  - allevyn    FEN   - Diet - Regular with thins    - SLP treatment ongoing     Precautions / PROPHYLAXIS:   - Falls  - ortho: Weight bearing status: WBAT   - Lungs: Aspiration  - Pressure injury/Skin: OOB to Chair, PT/OT        Gela Carrillo  Neurology  [address], [address]  [place] [address]  Phone: [phone]  Fax: [phone]  Follow Up Time: 1 week    Mirlande Torres  [facility]  [address]  Scheduled Appointment: 06/25/2025

## 2025-04-28 NOTE — PROGRESS NOTE ADULT - SUBJECTIVE AND OBJECTIVE BOX
HPI:  74 year old female with PMH of  COPD, pneumothorax, collapsed lung, and TIAs who presented to the ED at [place] on 4/16 complaining of severe right arm weakness. Per patient she woke up on  2 days prior to admit  with difficulty moving and lifting right arm. NIHSS was 5 in ED.  CTH showed high R parietal encephalomalacia and gliosis related likely to prior infarct, age indeterminate L CR, old BG L lacunar infarct, L thalamic lacunar infarct.  She was not a candidate for IV thrombolytics because she was out of the window. CTA was neg for LVO.  MRI brain this morning reveals acute L infarct in the corona radiate. She was placed on DAPT. Course complicated by elevated LFTs with workup unrevealing. She was evaluated for admission to acute inpatient rehab and admitted to [facility] on 4/18/25.  (18 Apr 2025 14:01)      Subjective:  - Patient was seen in bed, NAD, alert and awake  - on Seroquel per psych, no agitation, in good spirits, following commands well, to therapy  - Denies any pain.   - denies dysuria, abx completed/ UTI, afebrile  - tolerating DAPT+Lovenox-no bleeding reported    ROS:  - Denies CP, palpitation, SOB, cough, fever, chills, headache, dizziness, visual changes, abdominal pain, N/V/D/C, dysuria, hematuria      MEDICATIONS  (STANDING):  aspirin  chewable 81 milliGRAM(s) Oral daily  atorvastatin 80 milliGRAM(s) Oral at bedtime  clopidogrel Tablet 75 milliGRAM(s) Oral daily  enoxaparin Injectable 40 milliGRAM(s) SubCutaneous every 24 hours  fluticasone furoate/umeclidinium/vilanterol 200-62.5-25 MICROgram(s) Inhaler 1 Puff(s) Inhalation daily  losartan 25 milliGRAM(s) Oral daily  pantoprazole    Tablet 40 milliGRAM(s) Oral before breakfast  polyethylene glycol 3350 17 Gram(s) Oral daily  QUEtiapine 12.5 milliGRAM(s) Oral <User Schedule>  senna 2 Tablet(s) Oral at bedtime    MEDICATIONS  (PRN):  acetaminophen     Tablet .. 650 milliGRAM(s) Oral every 6 hours PRN Mild Pain (1 - 3)  albuterol    90 MICROgram(s) HFA Inhaler 2 Puff(s) Inhalation every 6 hours PRN Shortness of Breath and/or Wheezing  lidocaine   4% Patch 1 Patch Transdermal daily PRN pain  QUEtiapine 12.5 milliGRAM(s) Oral daily PRN agitation                            11.9   6.08  )-----------( 290      ( 28 Apr 2025 06:33 )             35.4     04-28    134[L]  |  103  |  10  ----------------------------<  107[H]  3.5   |  21[L]  |  0.61    Ca    8.5      28 Apr 2025 06:33    TPro  6.3  /  Alb  2.6[L]  /  TBili  0.9  /  DBili  x   /  AST  21  /  ALT  30  /  AlkPhos  110  04-28    Urinalysis Basic - ( 28 Apr 2025 06:33 )    Color: x / Appearance: x / SG: x / pH: x  Gluc: 107 mg/dL / Ketone: x  / Bili: x / Urobili: x   Blood: x / Protein: x / Nitrite: x   Leuk Esterase: x / RBC: x / WBC x   Sq Epi: x / Non Sq Epi: x / Bacteria: x      Vital Signs Last 24 Hrs  T(C): 36.8 (28 Apr 2025 07:44), Max: 36.9 (27 Apr 2025 22:09)  T(F): 98.2 (28 Apr 2025 07:44), Max: 98.5 (27 Apr 2025 22:09)  HR: 82 (28 Apr 2025 09:46) (71 - 89)  BP: 118/78 (28 Apr 2025 07:44) (118/78 - 150/79)  BP(mean): --  RR: 16 (28 Apr 2025 07:44) (15 - 16)  SpO2: 93% (28 Apr 2025 09:46) (93% - 94%)    Parameters below as of 28 Apr 2025 09:46  Patient On (Oxygen Delivery Method): room air      Gen - NAD in bed  Neuro-     Cognitive - awake, alert, follows commands well     Communication - Fluent     Memory: forgetful     Cranial Nerves - deferred     Motor - Right hemiparesis noted, against gravity at least. Left appears 5/5. Observed in therapy                      Sensory - Intact  to LT     Coordination - impaired      Tone - Normal  Psychiatric - Mood anxious, slept well overnight per nursing reports        Continue comprehensive acute rehab program consisting of 3hrs/day of OT/PT and SLP.

## 2025-04-28 NOTE — PROGRESS NOTE ADULT - SUBJECTIVE AND OBJECTIVE BOX
Medicine Progress Note    Patient is a 74y old  Female who presents with a chief complaint of Left thalamic ischemic stroke with right hemiparesis (27 Apr 2025 10:39)      SUBJECTIVE / OVERNIGHT EVENTS:  seen and examined  Chart reviewed  No overnight events  Limb weakness improving with therapy  BM+  No pain  No complaints      ROS:  denied fever/chills/CP/SOB/cough/palpitation/dizziness/abdominal pian/nausea/vomiting/diarrhoea/constipation/dysuria/leg or calf pain/headaches.all other ROS neg    MEDICATIONS  (STANDING):  aspirin  chewable 81 milliGRAM(s) Oral daily  atorvastatin 80 milliGRAM(s) Oral at bedtime  clopidogrel Tablet 75 milliGRAM(s) Oral daily  enoxaparin Injectable 40 milliGRAM(s) SubCutaneous every 24 hours  fluticasone furoate/umeclidinium/vilanterol 200-62.5-25 MICROgram(s) Inhaler 1 Puff(s) Inhalation daily  losartan 25 milliGRAM(s) Oral daily  pantoprazole    Tablet 40 milliGRAM(s) Oral before breakfast  polyethylene glycol 3350 17 Gram(s) Oral daily  QUEtiapine 12.5 milliGRAM(s) Oral <User Schedule>  senna 2 Tablet(s) Oral at bedtime    MEDICATIONS  (PRN):  acetaminophen     Tablet .. 650 milliGRAM(s) Oral every 6 hours PRN Mild Pain (1 - 3)  albuterol    90 MICROgram(s) HFA Inhaler 2 Puff(s) Inhalation every 6 hours PRN Shortness of Breath and/or Wheezing  lidocaine   4% Patch 1 Patch Transdermal daily PRN pain  QUEtiapine 12.5 milliGRAM(s) Oral daily PRN agitation    CAPILLARY BLOOD GLUCOSE        I&O's Summary      PHYSICAL EXAM:  Vital Signs Last 24 Hrs  T(C): 36.8 (28 Apr 2025 07:44), Max: 36.9 (27 Apr 2025 22:09)  T(F): 98.2 (28 Apr 2025 07:44), Max: 98.5 (27 Apr 2025 22:09)  HR: 82 (28 Apr 2025 09:46) (71 - 89)  BP: 118/78 (28 Apr 2025 07:44) (118/78 - 150/79)  BP(mean): --  RR: 16 (28 Apr 2025 07:44) (15 - 16)  SpO2: 93% (28 Apr 2025 09:46) (93% - 94%)    Parameters below as of 28 Apr 2025 09:46  Patient On (Oxygen Delivery Method): room air      GENERAL: Not in distress. Alert    HEENT: clear conjuctiva, MMM. no pallor or icterus  CARDIOVASCULAR: RRR S1, S2. No murmur/rubs/gallop  LUNGS: BLAE+, no rales, no wheezing, no rhonchi.    ABDOMEN: ND. Soft,  NT, no guarding / rebound / rigidity. BS normoactive  BACK: No spine tenderness.  EXTREMITIES: no edema. no leg or calf TP.  SKIN: warm and dry  PSYCHIATRIC: Calm.  No agitation.  CNS: AAO*3 moves limbs, follows commands. right sided weakness. right facial droop    LABS:                        11.9   6.08  )-----------( 290      ( 28 Apr 2025 06:33 )             35.4     04-28    134[L]  |  103  |  10  ----------------------------<  107[H]  3.5   |  21[L]  |  0.61    Ca    8.5      28 Apr 2025 06:33    TPro  6.3  /  Alb  2.6[L]  /  TBili  0.9  /  DBili  x   /  AST  21  /  ALT  30  /  AlkPhos  110  04-28          Urinalysis Basic - ( 28 Apr 2025 06:33 )    Color: x / Appearance: x / SG: x / pH: x  Gluc: 107 mg/dL / Ketone: x  / Bili: x / Urobili: x   Blood: x / Protein: x / Nitrite: x   Leuk Esterase: x / RBC: x / WBC x   Sq Epi: x / Non Sq Epi: x / Bacteria: x        COVID-19 PCR: NotDetec (18 Apr 2025 19:10)      RADIOLOGY & ADDITIONAL TESTS:  Imaging from Last 24 Hours:    Electrocardiogram/QTc Interval:    COORDINATION OF CARE:  Care Discussed with Consultants/Other Providers:

## 2025-04-29 ENCOUNTER — TRANSCRIPTION ENCOUNTER (OUTPATIENT)
Age: 75
End: 2025-04-29

## 2025-04-29 DIAGNOSIS — R73.03 PREDIABETES: ICD-10-CM

## 2025-04-29 DIAGNOSIS — R79.89 OTHER SPECIFIED ABNORMAL FINDINGS OF BLOOD CHEMISTRY: ICD-10-CM

## 2025-04-29 DIAGNOSIS — Z91.81 HISTORY OF FALLING: ICD-10-CM

## 2025-04-29 DIAGNOSIS — E78.5 HYPERLIPIDEMIA, UNSPECIFIED: ICD-10-CM

## 2025-04-29 DIAGNOSIS — I69.398 OTHER SEQUELAE OF CEREBRAL INFARCTION: ICD-10-CM

## 2025-04-29 DIAGNOSIS — Z79.82 LONG TERM (CURRENT) USE OF ASPIRIN: ICD-10-CM

## 2025-04-29 DIAGNOSIS — Z79.02 LONG TERM (CURRENT) USE OF ANTITHROMBOTICS/ANTIPLATELETS: ICD-10-CM

## 2025-04-29 DIAGNOSIS — R29.705 NIHSS SCORE 5: ICD-10-CM

## 2025-04-29 DIAGNOSIS — R29.810 FACIAL WEAKNESS: ICD-10-CM

## 2025-04-29 DIAGNOSIS — Z79.51 LONG TERM (CURRENT) USE OF INHALED STEROIDS: ICD-10-CM

## 2025-04-29 DIAGNOSIS — G81.91 HEMIPLEGIA, UNSPECIFIED AFFECTING RIGHT DOMINANT SIDE: ICD-10-CM

## 2025-04-29 DIAGNOSIS — R47.1 DYSARTHRIA AND ANARTHRIA: ICD-10-CM

## 2025-04-29 DIAGNOSIS — J43.9 EMPHYSEMA, UNSPECIFIED: ICD-10-CM

## 2025-04-29 DIAGNOSIS — I63.9 CEREBRAL INFARCTION, UNSPECIFIED: ICD-10-CM

## 2025-04-29 PROCEDURE — 99232 SBSQ HOSP IP/OBS MODERATE 35: CPT | Mod: GC

## 2025-04-29 PROCEDURE — 99232 SBSQ HOSP IP/OBS MODERATE 35: CPT

## 2025-04-29 RX ORDER — ACETAMINOPHEN 500 MG/5ML
2 LIQUID (ML) ORAL
Qty: 0 | Refills: 0 | DISCHARGE
Start: 2025-04-29

## 2025-04-29 RX ORDER — ATORVASTATIN CALCIUM 80 MG/1
1 TABLET, FILM COATED ORAL
Qty: 0 | Refills: 0 | DISCHARGE
Start: 2025-04-29

## 2025-04-29 RX ORDER — QUETIAPINE FUMARATE 25 MG/1
0.5 TABLET ORAL
Qty: 0 | Refills: 0 | DISCHARGE
Start: 2025-04-29

## 2025-04-29 RX ORDER — ALBUTEROL SULFATE 2.5 MG/3ML
2 VIAL, NEBULIZER (ML) INHALATION
Qty: 0 | Refills: 0 | DISCHARGE
Start: 2025-04-29

## 2025-04-29 RX ORDER — SENNA 187 MG
2 TABLET ORAL
Qty: 0 | Refills: 0 | DISCHARGE
Start: 2025-04-29

## 2025-04-29 RX ORDER — ASPIRIN 325 MG
1 TABLET ORAL
Qty: 0 | Refills: 0 | DISCHARGE
Start: 2025-04-29

## 2025-04-29 RX ORDER — CLOPIDOGREL BISULFATE 75 MG/1
1 TABLET, FILM COATED ORAL
Qty: 0 | Refills: 0 | DISCHARGE
Start: 2025-04-29

## 2025-04-29 RX ORDER — POLYETHYLENE GLYCOL 3350 17 G/17G
17 POWDER, FOR SOLUTION ORAL
Qty: 0 | Refills: 0 | DISCHARGE
Start: 2025-04-29

## 2025-04-29 RX ORDER — LOSARTAN POTASSIUM 100 MG/1
1 TABLET, FILM COATED ORAL
Qty: 0 | Refills: 0 | DISCHARGE
Start: 2025-04-29

## 2025-04-29 RX ADMIN — Medication 40 MILLIGRAM(S): at 06:14

## 2025-04-29 RX ADMIN — Medication 81 MILLIGRAM(S): at 12:01

## 2025-04-29 RX ADMIN — CLOPIDOGREL BISULFATE 75 MILLIGRAM(S): 75 TABLET, FILM COATED ORAL at 12:01

## 2025-04-29 RX ADMIN — Medication 2 TABLET(S): at 22:02

## 2025-04-29 RX ADMIN — ENOXAPARIN SODIUM 40 MILLIGRAM(S): 100 INJECTION SUBCUTANEOUS at 06:46

## 2025-04-29 RX ADMIN — FLUTICASONE FUROATE, UMECLIDINIUM BROMIDE AND VILANTEROL TRIFENATATE 1 PUFF(S): 100; 62.5; 25 POWDER RESPIRATORY (INHALATION) at 08:14

## 2025-04-29 RX ADMIN — QUETIAPINE FUMARATE 12.5 MILLIGRAM(S): 25 TABLET ORAL at 20:43

## 2025-04-29 RX ADMIN — ATORVASTATIN CALCIUM 80 MILLIGRAM(S): 80 TABLET, FILM COATED ORAL at 22:03

## 2025-04-29 RX ADMIN — LOSARTAN POTASSIUM 25 MILLIGRAM(S): 100 TABLET, FILM COATED ORAL at 06:14

## 2025-04-29 NOTE — DISCHARGE NOTE PROVIDER - NSDCFUSCHEDAPPT_GEN_ALL_CORE_FT
Mirlande Torres  [facility]  [address]UL[address]  Scheduled Appointment: 06/25/2025

## 2025-04-29 NOTE — DISCHARGE NOTE PROVIDER - NSDCMRMEDTOKEN_GEN_ALL_CORE_FT
acetaminophen 325 mg oral tablet: 2 tab(s) orally every 6 hours As needed Mild Pain (1 - 3)  albuterol 90 mcg/inh inhalation aerosol: 2 puff(s) inhaled every 6 hours As needed Shortness of Breath and/or Wheezing  aspirin 81 mg oral tablet, chewable: 1 tab(s) orally once a day  atorvastatin 80 mg oral tablet: 1 tab(s) orally once a day (at bedtime)  clopidogrel 75 mg oral tablet: 1 tab(s) orally once a day Until 5/7/25.  ipratropium-albuterol 0.5 mg-2.5 mg/3 mL inhalation solution: 3 milliliter(s) inhaled every 6 hours as needed for Shortness of Breath and/or Wheezing  losartan 25 mg oral tablet: 1 tab(s) orally once a day  pantoprazole 40 mg oral delayed release tablet: 1 tab(s) orally once a day (before a meal)  polyethylene glycol 3350 oral powder for reconstitution: 17 gram(s) orally once a day  senna leaf extract oral tablet: 2 tab(s) orally once a day (at bedtime)  Seroquel 25 mg oral tablet: 0.5 tab(s) orally once a day (at bedtime) For insomnia- to be given at 8pm.  Trelegy Ellipta 200 mcg-62.5 mcg-25 mcg/inh inhalation powder: 1 puff(s) inhaled once a day

## 2025-04-29 NOTE — DISCHARGE NOTE PROVIDER - NSDCCPCAREPLAN_GEN_ALL_CORE_FT
PRINCIPAL DISCHARGE DIAGNOSIS  Diagnosis: CVA (cerebrovascular accident)  Assessment and Plan of Treatment:       SECONDARY DISCHARGE DIAGNOSES  Diagnosis: Acute UTI  Assessment and Plan of Treatment:      PRINCIPAL DISCHARGE DIAGNOSIS  Diagnosis: Cerebral infarction  Assessment and Plan of Treatment: You had a cerebral infarction which is a type of stroke caused by a lack of blood flow to a specific area of your brain. Follow up CT head stable. Continue on Plavix, Aspirin and Lipitor as prescribed. Please follow up with your neurologist.      SECONDARY DISCHARGE DIAGNOSES  Diagnosis: Hypertension  Assessment and Plan of Treatment: Please continue to take your blood pressure medication (Cozaar) as prescribed. Please follow up with primary care provider.    Diagnosis: Acute UTI  Assessment and Plan of Treatment: You had a urinary tract infection (UTI) which was treated with antibiotics. No further antibioitcs are needed. Please follow up with primary care provider.     PRINCIPAL DISCHARGE DIAGNOSIS  Diagnosis: Cerebral infarction  Assessment and Plan of Treatment: You had a cerebral infarction which is a type of stroke caused by a lack of blood flow to a specific area of your brain. Follow up CT head stable. Continue on Aspirin, Lipitor and Plavix (TAKE UNTIL 5/7/25) as prescribed. Please follow up with your neurologist.      SECONDARY DISCHARGE DIAGNOSES  Diagnosis: Hypertension  Assessment and Plan of Treatment: Please continue to take your blood pressure medication (Cozaar) as prescribed. Please follow up with primary care provider.    Diagnosis: Acute UTI  Assessment and Plan of Treatment: You had a urinary tract infection (UTI) which was treated with antibiotics. No further antibioitcs are needed. Please follow up with primary care provider.

## 2025-04-29 NOTE — PROGRESS NOTE ADULT - SUBJECTIVE AND OBJECTIVE BOX
HPI:  74 year old female with PMH of  COPD, pneumothorax, collapsed lung, and TIAs who presented to the ED at [place] on 4/16 complaining of severe right arm weakness. Per patient she woke up on  2 days prior to admit  with difficulty moving and lifting right arm. NIHSS was 5 in ED.  CTH showed high R parietal encephalomalacia and gliosis related likely to prior infarct, age indeterminate L CR, old BG L lacunar infarct, L thalamic lacunar infarct.  She was not a candidate for IV thrombolytics because she was out of the window. CTA was neg for LVO.  MRI brain this morning reveals acute L infarct in the corona radiate. She was placed on DAPT. Course complicated by elevated LFTs with workup unrevealing. She was evaluated for admission to acute inpatient rehab and admitted to [facility] on 4/18/25.        Subjective:  - Patient was seen in bed, NAD, alert and awake  - on Seroquel at bedtime per psych, no agitation, in good spirits, following commands well, to therapy  - Denies any pain.   - denies dysuria, abx completed/ UTI, afebrile  - tolerating DAPT+Lovenox-no bleeding reported    ROS:  - Denies CP, palpitation, SOB, cough, fever, chills, headache, dizziness, visual changes, abdominal pain, N/V/D/C, dysuria, hematuria      MEDICATIONS  (STANDING):  aspirin  chewable 81 milliGRAM(s) Oral daily  atorvastatin 80 milliGRAM(s) Oral at bedtime  clopidogrel Tablet 75 milliGRAM(s) Oral daily  enoxaparin Injectable 40 milliGRAM(s) SubCutaneous every 24 hours  fluticasone furoate/umeclidinium/vilanterol 200-62.5-25 MICROgram(s) Inhaler 1 Puff(s) Inhalation daily  losartan 25 milliGRAM(s) Oral daily  pantoprazole    Tablet 40 milliGRAM(s) Oral before breakfast  polyethylene glycol 3350 17 Gram(s) Oral daily  QUEtiapine 12.5 milliGRAM(s) Oral <User Schedule>  senna 2 Tablet(s) Oral at bedtime    MEDICATIONS  (PRN):  acetaminophen     Tablet .. 650 milliGRAM(s) Oral every 6 hours PRN Mild Pain (1 - 3)  albuterol    90 MICROgram(s) HFA Inhaler 2 Puff(s) Inhalation every 6 hours PRN Shortness of Breath and/or Wheezing  lidocaine   4% Patch 1 Patch Transdermal daily PRN pain                            11.9   6.08  )-----------( 290      ( 28 Apr 2025 06:33 )             35.4     04-28    134[L]  |  103  |  10  ----------------------------<  107[H]  3.5   |  21[L]  |  0.61    Ca    8.5      28 Apr 2025 06:33    TPro  6.3  /  Alb  2.6[L]  /  TBili  0.9  /  DBili  x   /  AST  21  /  ALT  30  /  AlkPhos  110  04-28    Urinalysis Basic - ( 28 Apr 2025 06:33 )    Color: x / Appearance: x / SG: x / pH: x  Gluc: 107 mg/dL / Ketone: x  / Bili: x / Urobili: x   Blood: x / Protein: x / Nitrite: x   Leuk Esterase: x / RBC: x / WBC x   Sq Epi: x / Non Sq Epi: x / Bacteria: x        Vital Signs Last 24 Hrs  T(C): 37 (29 Apr 2025 07:27), Max: 37.2 (28 Apr 2025 22:34)  T(F): 98.6 (29 Apr 2025 07:27), Max: 98.9 (28 Apr 2025 22:34)  HR: 82 (29 Apr 2025 08:17) (77 - 83)  BP: 160/85 (29 Apr 2025 06:12) (158/87 - 160/85)  BP(mean): --  RR: 16 (29 Apr 2025 07:27) (16 - 16)  SpO2: 94% (29 Apr 2025 08:17) (92% - 94%)    Parameters below as of 29 Apr 2025 08:17  Patient On (Oxygen Delivery Method): room air        Gen - NAD in chair. well perfused, VSS in therapy  RRR  Pulm- lungs clear, no wheezing, no cough  Abd soft, w/chronic hernia on left, non tender  Calves soft  Neuro-     Cognitive - awake, alert, follows commands well     Communication - Fluent     Memory: forgetful     Cranial Nerves - deferred     Motor - Right hemiparesis noted, against gravity at least. Left appears 5/5. Observed in therapy                      Sensory - Intact  to LT     Coordination - impaired      Tone - Normal  Psychiatric - Mood anxious, slept well overnight per nursing reports      Continue comprehensive acute rehab program consisting of 3hrs/day of OT/PT and SLP.

## 2025-04-29 NOTE — PROGRESS NOTE ADULT - NS ATTEND AMEND GEN_ALL_CORE FT
No overnight events  Participating in therapy   Discussed in IDR rounds and with consultants   Continue current rehabilitation program  deficits being addressed in therapy program: balance, gait training and ADLs   barriers : caregiver support   active medical issues : none
Pt continues to be irritable   UCx with no growth, will stop abx tomorro w  CTH negative  She participated in therapy today
Patient seen and examined   No overnight events  Participating in therapy   Discussed in IDR rounds and with consultants   Continue current rehabilitation program  deficits being addressed in therapy program: balance, gait, hemiplegia   barriers : caregiver support  active medical issues : agitation-> Seroquel.
Patient seen and examined   No overnight events  Participating in therapy   Discussed in IDR rounds and with consultants   Continue current rehabilitation program  deficits being addressed in therapy program: balance, gait training and ADLs   barriers : caregiver support   active medical issues : agitation improved
Patient seen and examined   No overnight events  Participating in therapy   Discussed in IDR rounds and with consultants   Continue current rehabilitation program  deficits being addressed in therapy program: balance, gait, hemiplegia   barriers : caregiver support  active medical issues : agitaion-> seroquel
Pt continues to be irritable   UCx with no growth, abx stopped  CTH negative  She participated in therapy today.  goals are for supervision

## 2025-04-29 NOTE — DISCHARGE NOTE PROVIDER - NSDCACTIVITY_GEN_ALL_CORE
Do not drive or operate machinery/Do not make important decisions/No heavy lifting/straining/Activity as tolerated Rifampin Counseling: I discussed with the patient the risks of rifampin including but not limited to liver damage, kidney damage, red-orange body fluids, nausea/vomiting and severe allergy.

## 2025-04-29 NOTE — PROGRESS NOTE ADULT - ASSESSMENT
74 year old female with PMH of  COPD, pneumothorax, collapsed lung, and TIAs who presented to the ED at [place] on 4/16 complaining of severe right arm weakness. Per patient she woke up on  2 days prior to admit  with difficulty moving and lifting right arm. NIHSS was 5 in ED.  CTH showed high R parietal encephalomalacia and gliosis related likely to prior infarct, age indeterminate L CR, old BG L lacunar infarct, L thalamic lacunar infarct.  She was not a candidate for IV thrombolytics because she was out of the window. CTA was neg for LVO.  MRI brain this morning reveals acute L infarct in the corona radiate. She was placed on DAPT. Course complicated by elevated LFTs with workup unrevealing. She was evaluated for admission to acute inpatient rehab and admitted to [facility] on 4/18/25.       #Left Thalamic infarct now with dysarthria, right sided weakness, Gait Instability, ADL impairments and Functional impairments  #S/P Fall   - Comprehensive Rehab Program of PT/OT/SLP  - DAPT x 21 days and then aspirin 81mg daily from 4/16- tolerating well  - Atorvastatin 80 mg po daily LDL goal <70  - A1C (04/19) 5.9     #UTI  - completed abx, afebrile, denies dysuria, no retention reported by nursing    #Agitation   - EKG--> QTC below 500   - Seroquel 12.5 at 8 pm  - Seroquel 12.5 mg  PRN- dc- not in use, coopertaive/in good spirits   - neg acute CT head   - forgetful, less impulsive now, follows nursing care      #HTN  -Losartan 25mg daily     #COPD  -Albuterol PRN  -Home med: trelegy daily- therapeutic interchange to Advair plus Spiriva while admitted     #Pain control  - Tylenol PRN    #GI/Bowel Mgmt   - Senna at bedtime   - Miralax daily     #DVT prophylaxis   - Lovenox    #Skin:  - on admission b/l blanchable redness heels, BUE ecchymosis, 6.5cm gluteal fissure , stage I pressure injury to gluteal cleft  - allevyn    FEN   - Diet - Regular with thins    - SLP treatment ongoing     Precautions / PROPHYLAXIS:   - Falls  - ortho: Weight bearing status: WBAT   - Lungs: Aspiration  - Pressure injury/Skin: OOB to Chair, PT/OT        Gela Carrillo  Neurology  [address] [facility] [facility], [address]  [place] [address]  Phone: [phone]  Fax: [phone]  Follow Up Time: 1 week    Mirlande Torres  [facility]  [facility] [address]  Scheduled Appointment: 06/25/2025

## 2025-04-29 NOTE — DISCHARGE NOTE NURSING/CASE MANAGEMENT/SOCIAL WORK - PATIENT PORTAL LINK FT
You can access the FollowMyHealth Patient Portal offered by [facility] by registering at the following website: [email or web address]. By joining [employer]’s FollowMyHealth portal, you will also be able to view your health information using other applications (apps) compatible with our system.

## 2025-04-29 NOTE — PROGRESS NOTE ADULT - ASSESSMENT
74 year old female with PMH of  COPD, pneumothorax, collapsed lung, and TIAs who presented to the ED at [place] on 4/16 complaining of severe right arm weakness. Per patient she woke up on  2 days prior to admit  with difficulty moving and lifting right arm. NIHSS was 5 in ED.  CTH showed high R parietal encephalomalacia and gliosis related likely to prior infarct, age indeterminate L CR, old BG L lacunar infarct, L thalamic lacunar infarct.  She was not a candidate for IV thrombolytics because she was out of the window. CTA was neg for LVO.  MRI brain this morning reveals acute L infarct in the corona radiate. She was placed on DAPT. Course complicated by elevated LFTs with workup unrevealing. She was evaluated for admission to acute inpatient rehab and admitted to [facility] on 4/18/25.     #metabolic encephalopathy, suspected due to UTI - improving  -mild elevated leukocytosis - resolved   -s/p Vantin 200mg BID  -UCX no growth  -Seroquel PRN agitation  -  rec noted    #Hypokalemia  - K 3.5 on 4/28  - one dose of KCL 20 meq ordered. 4/28  - monitor K and Mg    #Left corona radiata infarct now with dysarthria, right sided weakness  -Continue DAPT x 21 days and then aspirin 81mg daily  -Continue statin   - comprehensive rehab  - fall, aspiration precautions    #HTN  - Target  -160  - Losartan 25mg daily   -Monitor BP, including OH.   - avoid hypotension and BP fluctuation  - DASH    #Hx COPD, not in exacerbation   -Continue albuterol PRN  -Home med: trelegy daily  -c/w Trelegy Ellipta   -resume Trelegy upon discharge    #Severe protein-calorie malnutrition  - nutrition recs as below    DVT prophylaxis - Lovenox

## 2025-04-29 NOTE — DISCHARGE NOTE PROVIDER - CARE PROVIDER_API CALL
Gela Parekh Musarat  Neurology  [address] [place] [address], [address] [place] [address]  Phone: [phone]  Fax: [phone]  Follow Up Time:    Gela Parekh Musarat  Neurology  [address] [facility], [address]  [place] [address]  Phone: [phone]  Fax: [phone]  Follow Up Time: 1 week    Mirlande Torres  Pulmonary Disease  [address] [place] [address]  Phone: [phone]  Fax: [phone]  Follow Up Time: 1 month

## 2025-04-29 NOTE — DISCHARGE NOTE PROVIDER - PROVIDER TOKENS
FREE:[LAST:[Izabella],FIRST:[Gela],PHONE:[phone]],FAX:[phone]],ADDRESS:[Gela Carrillo  Neurology  [address] [place] [address], [place] [address]]] FREE:[LAST:[Izabella],FIRST:[Gela],PHONE:[phone]],FAX:[phone]],ADDRESS:[Gela Carrillo  Neurology  [address] [place] [address], [place] [address]],FOLLOWUP:[1 week]],PROVIDER:[[id number]],FOLLOWUP:[1 month]]

## 2025-04-29 NOTE — DISCHARGE NOTE PROVIDER - HOSPITAL COURSE
74 year old female with PMH of  COPD, pneumothorax, collapsed lung, and TIAs who presented to the ED at [place] on 4/16 complaining of severe right arm weakness. Per patient she woke up on  2 days prior to admit  with difficulty moving and lifting right arm. NIHSS was 5 in ED.  CTH showed high R parietal encephalomalacia and gliosis related likely to prior infarct, age indeterminate L CR, old BG L lacunar infarct, L thalamic lacunar infarct.  She was not a candidate for IV thrombolytics because she was out of the window. CTA was neg for LVO.  MRI brain this morning reveals acute L infarct in the corona radiate. She was placed on DAPT. Course complicated by elevated LFTs with workup unrevealing. She was evaluated for admission to acute inpatient rehab and admitted to [facility] on 4/18/25.      Pt evaluated by hospitalist and psychiatry- antibiotics completed for UTI, negative CTH for behavioral changes, Seroquel for insomnia. Pt met goals for in-patient rehab and is awaiting discharge to [facility] for 04/30/2025.

## 2025-04-29 NOTE — PROGRESS NOTE ADULT - SUBJECTIVE AND OBJECTIVE BOX
Medicine Progress Note    Patient is a 74y old  Female who presents with a chief complaint of Left thalamic ischemic stroke with right hemiparesis (29 Apr 2025 11:32)      SUBJECTIVE / OVERNIGHT EVENTS:  no complaints except, " does not like breakfast"      ROS:  denied fever/chills/CP/SOB/cough/palpitation/dizziness/abdominal pian/nausea/vomiting/diarrhoea/constipation/dysuria/leg or calf pain/headaches.all other ROS neg    MEDICATIONS  (STANDING):  aspirin  chewable 81 milliGRAM(s) Oral daily  atorvastatin 80 milliGRAM(s) Oral at bedtime  clopidogrel Tablet 75 milliGRAM(s) Oral daily  enoxaparin Injectable 40 milliGRAM(s) SubCutaneous every 24 hours  fluticasone furoate/umeclidinium/vilanterol 200-62.5-25 MICROgram(s) Inhaler 1 Puff(s) Inhalation daily  losartan 25 milliGRAM(s) Oral daily  pantoprazole    Tablet 40 milliGRAM(s) Oral before breakfast  polyethylene glycol 3350 17 Gram(s) Oral daily  QUEtiapine 12.5 milliGRAM(s) Oral <User Schedule>  senna 2 Tablet(s) Oral at bedtime    MEDICATIONS  (PRN):  acetaminophen     Tablet .. 650 milliGRAM(s) Oral every 6 hours PRN Mild Pain (1 - 3)  albuterol    90 MICROgram(s) HFA Inhaler 2 Puff(s) Inhalation every 6 hours PRN Shortness of Breath and/or Wheezing  lidocaine   4% Patch 1 Patch Transdermal daily PRN pain    CAPILLARY BLOOD GLUCOSE        I&O's Summary      PHYSICAL EXAM:  Vital Signs Last 24 Hrs  T(C): 37 (29 Apr 2025 07:27), Max: 37.2 (28 Apr 2025 22:34)  T(F): 98.6 (29 Apr 2025 07:27), Max: 98.9 (28 Apr 2025 22:34)  HR: 82 (29 Apr 2025 08:17) (77 - 83)  BP: 160/85 (29 Apr 2025 06:12) (158/87 - 160/85)  BP(mean): --  RR: 16 (29 Apr 2025 07:27) (16 - 16)  SpO2: 94% (29 Apr 2025 08:17) (92% - 94%)    Parameters below as of 29 Apr 2025 08:17  Patient On (Oxygen Delivery Method): room air      GENERAL: Not in distress. Alert    HEENT: clear conjuctiva, MMM. no pallor or icterus  CARDIOVASCULAR: RRR S1, S2. No murmur/rubs/gallop  LUNGS: BLAE+, no rales, no wheezing, no rhonchi.    ABDOMEN: ND. Soft,  NT, no guarding / rebound / rigidity. BS normoactive  BACK: No spine tenderness.  EXTREMITIES: no edema. no leg or calf TP.  SKIN: warm and dry  PSYCHIATRIC: Calm.  No agitation.  CNS: AAO*3 moves limbs, follows commands. right sided weakness. right facial droop    LABS:                        11.9   6.08  )-----------( 290      ( 28 Apr 2025 06:33 )             35.4     04-28    134[L]  |  103  |  10  ----------------------------<  107[H]  3.5   |  21[L]  |  0.61    Ca    8.5      28 Apr 2025 06:33    TPro  6.3  /  Alb  2.6[L]  /  TBili  0.9  /  DBili  x   /  AST  21  /  ALT  30  /  AlkPhos  110  04-28          Urinalysis Basic - ( 28 Apr 2025 06:33 )    Color: x / Appearance: x / SG: x / pH: x  Gluc: 107 mg/dL / Ketone: x  / Bili: x / Urobili: x   Blood: x / Protein: x / Nitrite: x   Leuk Esterase: x / RBC: x / WBC x   Sq Epi: x / Non Sq Epi: x / Bacteria: x        COVID-19 PCR: NotDetec (18 Apr 2025 19:10)      RADIOLOGY & ADDITIONAL TESTS:  Imaging from Last 24 Hours:    Electrocardiogram/QTc Interval:    COORDINATION OF CARE:  Care Discussed with Consultants/Other Providers:

## 2025-04-29 NOTE — DISCHARGE NOTE PROVIDER - ATTENDING DISCHARGE PHYSICAL EXAMINATION:
Gen - NAD in chair. well perfused, VSS in therapy  RRR  Pulm- lungs clear, no wheezing, no cough  Abd soft, w/chronic hernia on left, non tender  Calves soft  Neuro-     Cognitive - awake, alert, follows commands well     Communication - Fluent     Memory: forgetful     Cranial Nerves - deferred     Motor - Right hemiparesis noted, against gravity at least. Left appears 5/5. Observed in therapy                      Sensory - Intact  to LT     Coordination - impaired      Tone - Normal  Psychiatric -  slept well overnight per nursing reports

## 2025-04-29 NOTE — DISCHARGE NOTE PROVIDER - CARE PROVIDERS DIRECT ADDRESSES
,DirectAddress_Unknown ,DirectAddress_Unknown,[email or web address]

## 2025-04-29 NOTE — DISCHARGE NOTE NURSING/CASE MANAGEMENT/SOCIAL WORK - FINANCIAL ASSISTANCE
[facility] provides services at a reduced cost to those who are determined to be eligible through [facility]’s financial assistance program. Information regarding [facility]’s financial assistance program can be found by going to [email or web address] or by calling [phone].

## 2025-04-30 VITALS — OXYGEN SATURATION: 94 %

## 2025-04-30 PROCEDURE — 97530 THERAPEUTIC ACTIVITIES: CPT | Mod: GO

## 2025-04-30 PROCEDURE — 92523 SPEECH SOUND LANG COMPREHEN: CPT | Mod: GN

## 2025-04-30 PROCEDURE — 73130 X-RAY EXAM OF HAND: CPT

## 2025-04-30 PROCEDURE — 93005 ELECTROCARDIOGRAM TRACING: CPT

## 2025-04-30 PROCEDURE — 97110 THERAPEUTIC EXERCISES: CPT | Mod: GO

## 2025-04-30 PROCEDURE — 94640 AIRWAY INHALATION TREATMENT: CPT

## 2025-04-30 PROCEDURE — 85025 COMPLETE CBC W/AUTO DIFF WBC: CPT

## 2025-04-30 PROCEDURE — 97161 PT EVAL LOW COMPLEX 20 MIN: CPT | Mod: GP

## 2025-04-30 PROCEDURE — 87086 URINE CULTURE/COLONY COUNT: CPT

## 2025-04-30 PROCEDURE — 99232 SBSQ HOSP IP/OBS MODERATE 35: CPT

## 2025-04-30 PROCEDURE — 81001 URINALYSIS AUTO W/SCOPE: CPT

## 2025-04-30 PROCEDURE — 73110 X-RAY EXAM OF WRIST: CPT

## 2025-04-30 PROCEDURE — 97112 NEUROMUSCULAR REEDUCATION: CPT | Mod: GO

## 2025-04-30 PROCEDURE — 99239 HOSP IP/OBS DSCHRG MGMT >30: CPT | Mod: GC

## 2025-04-30 PROCEDURE — 36415 COLL VENOUS BLD VENIPUNCTURE: CPT

## 2025-04-30 PROCEDURE — 73090 X-RAY EXAM OF FOREARM: CPT

## 2025-04-30 PROCEDURE — 80053 COMPREHEN METABOLIC PANEL: CPT

## 2025-04-30 PROCEDURE — 92507 TX SP LANG VOICE COMM INDIV: CPT | Mod: GN

## 2025-04-30 PROCEDURE — 92610 EVALUATE SWALLOWING FUNCTION: CPT | Mod: GN

## 2025-04-30 PROCEDURE — 87635 SARS-COV-2 COVID-19 AMP PRB: CPT

## 2025-04-30 PROCEDURE — 97116 GAIT TRAINING THERAPY: CPT | Mod: GP

## 2025-04-30 PROCEDURE — 97535 SELF CARE MNGMENT TRAINING: CPT | Mod: GO

## 2025-04-30 PROCEDURE — 97165 OT EVAL LOW COMPLEX 30 MIN: CPT | Mod: GO

## 2025-04-30 PROCEDURE — 70450 CT HEAD/BRAIN W/O DYE: CPT | Mod: MC

## 2025-04-30 RX ADMIN — FLUTICASONE FUROATE, UMECLIDINIUM BROMIDE AND VILANTEROL TRIFENATATE 1 PUFF(S): 100; 62.5; 25 POWDER RESPIRATORY (INHALATION) at 08:16

## 2025-04-30 RX ADMIN — Medication 40 MILLIGRAM(S): at 06:44

## 2025-04-30 RX ADMIN — Medication 81 MILLIGRAM(S): at 11:12

## 2025-04-30 RX ADMIN — ENOXAPARIN SODIUM 40 MILLIGRAM(S): 100 INJECTION SUBCUTANEOUS at 06:45

## 2025-04-30 RX ADMIN — CLOPIDOGREL BISULFATE 75 MILLIGRAM(S): 75 TABLET, FILM COATED ORAL at 11:12

## 2025-04-30 RX ADMIN — LOSARTAN POTASSIUM 25 MILLIGRAM(S): 100 TABLET, FILM COATED ORAL at 06:44

## 2025-04-30 NOTE — PROGRESS NOTE ADULT - PROVIDER SPECIALTY LIST ADULT
Hospitalist
Physiatry
Physiatry
Hospitalist
Physiatry
Hospitalist
Physiatry
Physiatry
Rehab Medicine
Hospitalist
Physiatry
Rehab Medicine
Hospitalist
Physiatry
Hospitalist

## 2025-04-30 NOTE — PROGRESS NOTE ADULT - SUBJECTIVE AND OBJECTIVE BOX
HPI:  74 year old female with PMH of  COPD, pneumothorax, collapsed lung, and TIAs who presented to the ED at [place] on 4/16 complaining of severe right arm weakness. Per patient she woke up on  2 days prior to admit  with difficulty moving and lifting right arm. NIHSS was 5 in ED.  CTH showed high R parietal encephalomalacia and gliosis related likely to prior infarct, age indeterminate L CR, old BG L lacunar infarct, L thalamic lacunar infarct.  She was not a candidate for IV thrombolytics because she was out of the window. CTA was neg for LVO.  MRI brain this morning reveals acute L infarct in the corona radiate. She was placed on DAPT. Course complicated by elevated LFTs with workup unrevealing. She was evaluated for admission to acute inpatient rehab and admitted to [facility] on 4/18/25.        Subjective:  - Patient was seen in bed, NAD, alert and awake  - On Seroquel at bedtime per psych, no agitation, in good spirits, following commands well, to therapy  - Denies any pain.   - Denies dysuria, abx completed/ UTI, afebrile  - Tolerating DAPT+Lovenox- no bleeding reported    ROS:  - Denies CP, palpitation, SOB, cough, fever, chills, headache, dizziness, visual changes, abdominal pain, N/V/D/C, dysuria, hematuria      MEDICATIONS  (STANDING):  aspirin  chewable 81 milliGRAM(s) Oral daily  atorvastatin 80 milliGRAM(s) Oral at bedtime  clopidogrel Tablet 75 milliGRAM(s) Oral daily  enoxaparin Injectable 40 milliGRAM(s) SubCutaneous every 24 hours  fluticasone furoate/umeclidinium/vilanterol 200-62.5-25 MICROgram(s) Inhaler 1 Puff(s) Inhalation daily  losartan 25 milliGRAM(s) Oral daily  pantoprazole    Tablet 40 milliGRAM(s) Oral before breakfast  polyethylene glycol 3350 17 Gram(s) Oral daily  QUEtiapine 12.5 milliGRAM(s) Oral <User Schedule>  senna 2 Tablet(s) Oral at bedtime    MEDICATIONS  (PRN):  acetaminophen     Tablet .. 650 milliGRAM(s) Oral every 6 hours PRN Mild Pain (1 - 3)  albuterol    90 MICROgram(s) HFA Inhaler 2 Puff(s) Inhalation every 6 hours PRN Shortness of Breath and/or Wheezing  lidocaine   4% Patch 1 Patch Transdermal daily PRN pain    LABS                      11.9   6.08  )-----------( 290      ( 28 Apr 2025 06:33 )             35.4     04-28    134[L]  |  103  |  10  ----------------------------<  107[H]  3.5   |  21[L]  |  0.61    Ca    8.5      28 Apr 2025 06:33    TPro  6.3  /  Alb  2.6[L]  /  TBili  0.9  /  DBili  x   /  AST  21  /  ALT  30  /  AlkPhos  110  04-28    Urinalysis Basic - ( 28 Apr 2025 06:33 )    Color: x / Appearance: x / SG: x / pH: x  Gluc: 107 mg/dL / Ketone: x  / Bili: x / Urobili: x   Blood: x / Protein: x / Nitrite: x   Leuk Esterase: x / RBC: x / WBC x   Sq Epi: x / Non Sq Epi: x / Bacteria: x      Vital Signs Last 24 Hrs  T(C): 37 (29 Apr 2025 20:42), Max: 37 (29 Apr 2025 20:42)  T(F): 98.6 (29 Apr 2025 20:42), Max: 98.6 (29 Apr 2025 20:42)  HR: 85 (30 Apr 2025 08:17) (84 - 94)  BP: 172/74 (30 Apr 2025 06:43) (125/78 - 172/74)  BP(mean): --  RR: 18 (29 Apr 2025 20:42) (18 - 18)  SpO2: 94% (30 Apr 2025 08:17) (92% - 94%)    Parameters below as of 30 Apr 2025 08:17  Patient On (Oxygen Delivery Method): room air      Gen - NAD in chair. well perfused, VSS in therapy  RRR  Pulm- lungs clear, no wheezing, no cough  Abd soft, w/chronic hernia on left, non tender  Calves soft  Neuro-     Cognitive - awake, alert, follows commands well     Communication - Fluent     Memory: forgetful     Cranial Nerves - deferred     Motor - Right hemiparesis noted, against gravity at least. Left appears 5/5. Observed in therapy                      Sensory - Intact  to LT     Coordination - impaired      Tone - Normal  Psychiatric - Mood anxious, slept well overnight per nursing reports      Continue comprehensive acute rehab program consisting of 3hrs/day of OT/PT and SLP.

## 2025-04-30 NOTE — PROGRESS NOTE ADULT - ASSESSMENT
74 year old female with PMH of  COPD, pneumothorax, collapsed lung, and TIAs who presented to the ED at [place] on 4/16 complaining of severe right arm weakness. Per patient she woke up on  2 days prior to admit  with difficulty moving and lifting right arm. NIHSS was 5 in ED.  CTH showed high R parietal encephalomalacia and gliosis related likely to prior infarct, age indeterminate L CR, old BG L lacunar infarct, L thalamic lacunar infarct.  She was not a candidate for IV thrombolytics because she was out of the window. CTA was neg for LVO.  MRI brain this morning reveals acute L infarct in the corona radiate. She was placed on DAPT. Course complicated by elevated LFTs with workup unrevealing. She was evaluated for admission to acute inpatient rehab and admitted to [facility] on 4/18/25.       #Left Thalamic infarct now with dysarthria, right sided weakness, Gait Instability, ADL impairments and Functional impairments/#S/P Fall   - Comprehensive Rehab Program of PT/OT/SLP  - DAPT x 21 days and then aspirin 81mg daily from 4/16- tolerating well  - Atorvastatin 80 mg po daily LDL goal <70  - A1C (04/19) 5.9     #UTI  - Completed abx, afebrile, denies dysuria, no retention reported by nursing    #Agitation   - EKG--> QTC below 500   - Seroquel 12.5 at 8 pm  - Seroquel 12.5 mg  PRN- dc- not in use, cooperative and in good spirits   - neg acute CT head   - forgetful, less impulsive now, follows nursing care      #HTN  -Losartan 25mg daily     #COPD  -Albuterol PRN  -Home med: Trelegy daily- therapeutic interchange to Advair plus Spiriva while admitted     #Pain control  - Tylenol PRN    #GI/Bowel Mgmt   - Senna at bedtime   - Miralax daily     #DVT prophylaxis   - Lovenox    #Skin:  - on admission b/l blanchable redness heels, BUE ecchymosis, 6.5cm gluteal fissure , stage I pressure injury to gluteal cleft  - Allevyn    FEN   - Diet - Regular with thins    - SLP treatment ongoing     Precautions / PROPHYLAXIS:   - Falls  - ortho: Weight bearing status: WBAT   - Lungs: Aspiration  - Pressure injury/Skin: OOB to Chair, PT/OT        Shareeff, Musarat  Neurology  [address] [place] [address], [address]  [place] [address]  Phone: [phone]  Fax: [phone]  Follow Up Time: 1 week    Mirlande Torres  [facility]  [facility] [address]  Scheduled Appointment: 06/25/2025

## 2025-04-30 NOTE — PROGRESS NOTE ADULT - SUBJECTIVE AND OBJECTIVE BOX
Patient is a 74y old  Female who presents with a chief complaint of Left thalamic ischemic stroke with right hemiparesis     SUBJECTIVE / OVERNIGHT EVENTS:  seen and examined  Chart reviewed  No overnight events   Patient  Patient is a 74y old  Female who presents with a chief complaint of Left thalamic ischemic stroke with right hemiparesis     SUBJECTIVE / OVERNIGHT EVENTS:  seen and examined  Chart reviewed  No overnight events   Right sided numbness/weakness    denied Chest pain/SOB/palpitation/abd pain/n/v/d/c/dysuria/flank pain/headaches//LOC. all other ROS neg     MEDICATIONS  (STANDING):  aspirin  chewable 81 milliGRAM(s) Oral daily  atorvastatin 80 milliGRAM(s) Oral at bedtime  clopidogrel Tablet 75 milliGRAM(s) Oral daily  enoxaparin Injectable 40 milliGRAM(s) SubCutaneous every 24 hours  fluticasone furoate/umeclidinium/vilanterol 200-62.5-25 MICROgram(s) Inhaler 1 Puff(s) Inhalation daily  losartan 25 milliGRAM(s) Oral daily  pantoprazole    Tablet 40 milliGRAM(s) Oral before breakfast  polyethylene glycol 3350 17 Gram(s) Oral daily  QUEtiapine 12.5 milliGRAM(s) Oral <User Schedule>  senna 2 Tablet(s) Oral at bedtime    MEDICATIONS  (PRN):  acetaminophen     Tablet .. 650 milliGRAM(s) Oral every 6 hours PRN Mild Pain (1 - 3)  albuterol    90 MICROgram(s) HFA Inhaler 2 Puff(s) Inhalation every 6 hours PRN Shortness of Breath and/or Wheezing  lidocaine   4% Patch 1 Patch Transdermal daily PRN pain      CAPILLARY BLOOD GLUCOSE      I&O's Summary    Vital Signs Last 24 Hrs  T(C): 37 (29 Apr 2025 20:42), Max: 37 (29 Apr 2025 20:42)  T(F): 98.6 (29 Apr 2025 20:42), Max: 98.6 (29 Apr 2025 20:42)  HR: 85 (30 Apr 2025 08:17) (84 - 94)  BP: 172/74 (30 Apr 2025 06:43) (125/78 - 172/74)  BP(mean): --  RR: 18 (29 Apr 2025 20:42) (18 - 18)  SpO2: 94% (30 Apr 2025 08:17) (92% - 94%)    Parameters below as of 30 Apr 2025 08:17  Patient On (Oxygen Delivery Method): room air    GENERAL: Patient is well groomed, no acute distress   HEENT:  Normocephalic and atraumatic.   NECK: Supple.  No JVD.    CARDIOVASCULAR: Regular Rate and Rhythm S1, S2. No murmur/rubs/gallop  LUNGS: Bilateral lungs clear to auscultation, no rales, no wheezing, no rhonchi.    ABDOMEN: No distention. Soft, non-tender, no guarding / rebound / rigidity. bowel sound normoactive.   BACK: No spine tenderness.  EXTREMITIES: no cyanosis, no clubbing, no edema.   SKIN: no rash. No skin break or ulcer. No cellulitis.  NEUROLOGIC: A*O x4, . Right sided weakness, sensation intact, speech fluent.    PSYCHIATRIC: Calm.  No agitation. follows commands     CBC:     Chem:     Liver Functions:     Type & Screen:      Patient is a 74y old  Female who presents with a chief complaint of Left thalamic ischemic stroke with right hemiparesis     SUBJECTIVE / OVERNIGHT EVENTS:  seen and examined  Chart reviewed  No overnight events   Right sided numbness/weakness      ROS:  denied Chest pain/SOB/palpitation/abd pain/n/v/d/c/dysuria/flank pain/headaches//LOC. all other ROS neg     MEDICATIONS  (STANDING):  aspirin  chewable 81 milliGRAM(s) Oral daily  atorvastatin 80 milliGRAM(s) Oral at bedtime  clopidogrel Tablet 75 milliGRAM(s) Oral daily  enoxaparin Injectable 40 milliGRAM(s) SubCutaneous every 24 hours  fluticasone furoate/umeclidinium/vilanterol 200-62.5-25 MICROgram(s) Inhaler 1 Puff(s) Inhalation daily  losartan 25 milliGRAM(s) Oral daily  pantoprazole    Tablet 40 milliGRAM(s) Oral before breakfast  polyethylene glycol 3350 17 Gram(s) Oral daily  QUEtiapine 12.5 milliGRAM(s) Oral <User Schedule>  senna 2 Tablet(s) Oral at bedtime    MEDICATIONS  (PRN):  acetaminophen     Tablet .. 650 milliGRAM(s) Oral every 6 hours PRN Mild Pain (1 - 3)  albuterol    90 MICROgram(s) HFA Inhaler 2 Puff(s) Inhalation every 6 hours PRN Shortness of Breath and/or Wheezing  lidocaine   4% Patch 1 Patch Transdermal daily PRN pain      CAPILLARY BLOOD GLUCOSE      I&O's Summary    Vital Signs Last 24 Hrs  T(C): 37 (29 Apr 2025 20:42), Max: 37 (29 Apr 2025 20:42)  T(F): 98.6 (29 Apr 2025 20:42), Max: 98.6 (29 Apr 2025 20:42)  HR: 85 (30 Apr 2025 08:17) (84 - 94)  BP: 172/74 (30 Apr 2025 06:43) (125/78 - 172/74)  BP(mean): --  RR: 18 (29 Apr 2025 20:42) (18 - 18)  SpO2: 94% (30 Apr 2025 08:17) (92% - 94%)    Parameters below as of 30 Apr 2025 08:17  Patient On (Oxygen Delivery Method): room air    GENERAL: Patient is well groomed, no acute distress   HEENT:  Normocephalic and atraumatic.   NECK: Supple.  No JVD.    CARDIOVASCULAR: Regular Rate and Rhythm S1, S2. No murmur/rubs/gallop  LUNGS: Bilateral lungs clear to auscultation, no rales, no wheezing, no rhonchi.    ABDOMEN: No distention. Soft, non-tender, no guarding / rebound / rigidity. bowel sound normoactive.   BACK: No spine tenderness.  EXTREMITIES: no cyanosis, no clubbing, no edema.   SKIN: no rash. No skin break or ulcer. No cellulitis.  NEUROLOGIC: A*O x4, . Right sided weakness, sensation intact, speech fluent.    PSYCHIATRIC: Calm.  No agitation. follows commands     CBC:     Chem:     Liver Functions:     Type & Screen:

## 2025-04-30 NOTE — PROGRESS NOTE ADULT - ASSESSMENT
HPI:  74 year old female with PMH of  COPD, pneumothorax, collapsed lung, and TIAs who presented to the ED at [place] on 4/16 complaining of severe right arm weakness. Per patient she woke up on  2 days prior to admit  with difficulty moving and lifting right arm. NIHSS was 5 in ED.  CTH showed high R parietal encephalomalacia and gliosis related likely to prior infarct, age indeterminate L CR, old BG L lacunar infarct, L thalamic lacunar infarct.  She was not a candidate for IV thrombolytics because she was out of the window. CTA was neg for LVO.  MRI brain this morning reveals acute L infarct in the corona radiate. She was placed on DAPT. Course complicated by elevated LFTs with workup unrevealing. She is now admitted to [facility] for Acute rehab 4/18/25.     HPI:  74 year old female with PMH of  COPD, pneumothorax, collapsed lung, and TIAs who presented to the ED at [place] on 4/16 complaining of severe right arm weakness. Per patient she woke up on  2 days prior to admit  with difficulty moving and lifting right arm. NIHSS was 5 in ED.  CTH showed high R parietal encephalomalacia and gliosis related likely to prior infarct, age indeterminate L CR, old BG L lacunar infarct, L thalamic lacunar infarct.  She was not a candidate for IV thrombolytics because she was out of the window. CTA was neg for LVO.  MRI brain this morning reveals acute L infarct in the corona radiate. She was placed on DAPT. Course complicated by elevated LFTs with workup unrevealing. She is now admitted to [facility] for Acute rehab 4/18/25.      =Left corona radiata infarct now with dysarthria, right sided weakness  -Aspirin and PLavix x 21 days and then aspirin 81mg daily 5/7/25  -Atorvastatin 80mg PO at bedtime  -comprehensive rehab  -Oral hygiene for management of secretions  -Fell at home  -fall, aspiration precautions  -A1C 5.9 on 4/19/25    =Agitation  -Patient on seroquel 12.5mg po at 2000  -cooperative during exam  -appropriate responses and interactive    =Hypertension  -Losartan 25mg daily    =COPD  -Albuterol PRN for wheezing/SOB  -Trelegy Daily    =Pain control  -Tylenol 650mg po q6h prn    =Bowel  -Senna at bedtime  -Miralax daily     =Hypokalemia  -3.5K on 4/28 given 20meQ   -Monitor on BMP     =Nutrition  -SLP rec Regular with thin liquids with multivitamin    =DVT ppx  -Lovenox 40mg subcutaneous daily    discussed with dr Matson HPI:  74 year old female with PMH of  COPD, pneumothorax, collapsed lung, and TIAs who presented to the ED at [place] on 4/16 complaining of severe right arm weakness. Per patient she woke up on  2 days prior to admit  with difficulty moving and lifting right arm. NIHSS was 5 in ED.  CTH showed high R parietal encephalomalacia and gliosis related likely to prior infarct, age indeterminate L CR, old BG L lacunar infarct, L thalamic lacunar infarct.  She was not a candidate for IV thrombolytics because she was out of the window. CTA was neg for LVO.  MRI brain this morning reveals acute L infarct in the corona radiate. She was placed on DAPT. Course complicated by elevated LFTs with workup unrevealing. She is now admitted to [facility] for Acute rehab 4/18/25.      =Left corona radiata infarct now with dysarthria, right sided weakness  -Aspirin and PLavix x 21 days and then aspirin 81mg daily 5/7/25  -Atorvastatin 80mg PO at bedtime  -comprehensive rehab  -Oral hygiene for management of secretions  -Fell at home  -fall, aspiration precautions  -A1C 5.9 on 4/19/25    =Agitation  -Patient on seroquel 12.5mg po at 2000  -cooperative during exam  -appropriate responses and interactive    =Hypertension  - BP elevated once in am. better in therapy  - Losartan 25mg daily  - DASH  - monitor VS including OH and adjust meds    =COPD  - chronic, stable  -Albuterol PRN for wheezing/SOB  -Trelegy Daily    =Pain control  -Tylenol 650mg po q6h prn    =Bowel  -Senna at bedtime  -Miralax daily     =Hypokalemia  -3.5K on 4/28 given 20meQ   -Monitor on BMP     =Nutrition  -SLP rec Regular with thin liquids with multivitamin    =DVT ppx  -Lovenox 40mg subcutaneous daily    discussed with dr Matson

## 2025-04-30 NOTE — PROGRESS NOTE ADULT - NUTRITIONAL ASSESSMENT
This patient has been assessed with a concern for Malnutrition and has been determined to have a diagnosis/diagnoses of Severe protein-calorie malnutrition.    This patient is being managed with:   Diet Regular-  Entered: Apr 23 2025  8:18AM    The following pending diet order is being considered for treatment of Severe protein-calorie malnutrition:  Diet Regular-  Supplement Feeding Modality:  Oral  Ensure Plus High Protein Cans or Servings Per Day:  1       Frequency:  Daily  Entered: Apr 23 2025  9:09AM  
This patient has been assessed with a concern for Malnutrition and has been determined to have a diagnosis/diagnoses of Severe protein-calorie malnutrition.    This patient is being managed with:   Diet Regular-  Supplement Feeding Modality:  Oral  Ensure Plus High Protein Cans or Servings Per Day:  1       Frequency:  Daily  Entered: Apr 23 2025  9:09AM  
This patient has been assessed with a concern for Malnutrition and has been determined to have a diagnosis/diagnoses of Severe protein-calorie malnutrition.    This patient is being managed with:   Diet DASH/TLC-  Sodium & Cholesterol Restricted  Entered: Apr 18 2025  4:43PM  
This patient has been assessed with a concern for Malnutrition and has been determined to have a diagnosis/diagnoses of Severe protein-calorie malnutrition.    This patient is being managed with:   Diet Regular-  Supplement Feeding Modality:  Oral  Ensure Plus High Protein Cans or Servings Per Day:  1       Frequency:  Daily  Entered: Apr 23 2025  9:09AM  
This patient has been assessed with a concern for Malnutrition and has been determined to have a diagnosis/diagnoses of Severe protein-calorie malnutrition.    This patient is being managed with:   Diet Regular-  Supplement Feeding Modality:  Oral  Ensure Plus High Protein Cans or Servings Per Day:  1       Frequency:  Daily  Entered: Apr 23 2025  9:09AM  
This patient has been assessed with a concern for Malnutrition and has been determined to have a diagnosis/diagnoses of Severe protein-calorie malnutrition.    This patient is being managed with:   Diet Regular-  Entered: Apr 23 2025  8:18AM    The following pending diet order is being considered for treatment of Severe protein-calorie malnutrition:  Diet Regular-  Supplement Feeding Modality:  Oral  Ensure Plus High Protein Cans or Servings Per Day:  1       Frequency:  Daily  Entered: Apr 23 2025  9:09AM  
This patient has been assessed with a concern for Malnutrition and has been determined to have a diagnosis/diagnoses of Severe protein-calorie malnutrition.    This patient is being managed with:   Diet DASH/TLC-  Sodium & Cholesterol Restricted  Entered: Apr 18 2025  4:43PM  
This patient has been assessed with a concern for Malnutrition and has been determined to have a diagnosis/diagnoses of Severe protein-calorie malnutrition.    This patient is being managed with:   Diet Regular-  Supplement Feeding Modality:  Oral  Ensure Plus High Protein Cans or Servings Per Day:  1       Frequency:  Daily  Entered: Apr 23 2025  9:09AM  

## 2025-04-30 NOTE — PROGRESS NOTE ADULT - REASON FOR ADMISSION
Left thalamic ischemic stroke with right hemiparesis

## 2025-06-25 ENCOUNTER — APPOINTMENT (OUTPATIENT)
Dept: PULMONOLOGY | Facility: CLINIC | Age: 75
End: 2025-06-25